# Patient Record
Sex: MALE | Race: BLACK OR AFRICAN AMERICAN | Employment: UNEMPLOYED | URBAN - METROPOLITAN AREA
[De-identification: names, ages, dates, MRNs, and addresses within clinical notes are randomized per-mention and may not be internally consistent; named-entity substitution may affect disease eponyms.]

---

## 2019-05-12 ENCOUNTER — APPOINTMENT (OUTPATIENT)
Dept: CT IMAGING | Age: 64
DRG: 045 | End: 2019-05-12
Payer: COMMERCIAL

## 2019-05-12 ENCOUNTER — APPOINTMENT (OUTPATIENT)
Dept: GENERAL RADIOLOGY | Age: 64
DRG: 045 | End: 2019-05-12
Payer: COMMERCIAL

## 2019-05-12 ENCOUNTER — HOSPITAL ENCOUNTER (INPATIENT)
Age: 64
LOS: 3 days | Discharge: NURSING, REHAB OR LONG-TERM CARE | DRG: 045 | End: 2019-05-15
Attending: EMERGENCY MEDICINE | Admitting: INTERNAL MEDICINE
Payer: COMMERCIAL

## 2019-05-12 DIAGNOSIS — M54.9 CVA TENDERNESS: Primary | ICD-10-CM

## 2019-05-12 DIAGNOSIS — E04.2 MULTIPLE THYROID NODULES: ICD-10-CM

## 2019-05-12 PROBLEM — I63.9 ACUTE CVA (CEREBROVASCULAR ACCIDENT) (HCC): Status: ACTIVE | Noted: 2019-05-12

## 2019-05-12 LAB
ANION GAP SERPL CALCULATED.3IONS-SCNC: 6 MMOL/L (ref 3–16)
BASOPHILS ABSOLUTE: 0.1 K/UL (ref 0–0.2)
BASOPHILS RELATIVE PERCENT: 1.2 %
BUN BLDV-MCNC: 14 MG/DL (ref 7–20)
CALCIUM SERPL-MCNC: 9.2 MG/DL (ref 8.3–10.6)
CHLORIDE BLD-SCNC: 106 MMOL/L (ref 99–110)
CO2: 28 MMOL/L (ref 21–32)
CREAT SERPL-MCNC: 0.7 MG/DL (ref 0.8–1.3)
EOSINOPHILS ABSOLUTE: 0.1 K/UL (ref 0–0.6)
EOSINOPHILS RELATIVE PERCENT: 2.7 %
GFR AFRICAN AMERICAN: >60
GFR NON-AFRICAN AMERICAN: >60
GLUCOSE BLD-MCNC: 113 MG/DL (ref 70–99)
GLUCOSE BLD-MCNC: 117 MG/DL (ref 70–99)
HCT VFR BLD CALC: 41.8 % (ref 40.5–52.5)
HEMOGLOBIN: 13.9 G/DL (ref 13.5–17.5)
INR BLD: 1.33 (ref 0.86–1.14)
LYMPHOCYTES ABSOLUTE: 1.5 K/UL (ref 1–5.1)
LYMPHOCYTES RELATIVE PERCENT: 30.6 %
MCH RBC QN AUTO: 28.8 PG (ref 26–34)
MCHC RBC AUTO-ENTMCNC: 33.2 G/DL (ref 31–36)
MCV RBC AUTO: 86.5 FL (ref 80–100)
MONOCYTES ABSOLUTE: 0.5 K/UL (ref 0–1.3)
MONOCYTES RELATIVE PERCENT: 9.4 %
NEUTROPHILS ABSOLUTE: 2.8 K/UL (ref 1.7–7.7)
NEUTROPHILS RELATIVE PERCENT: 56.1 %
PDW BLD-RTO: 14.2 % (ref 12.4–15.4)
PERFORMED ON: ABNORMAL
PLATELET # BLD: 241 K/UL (ref 135–450)
PMV BLD AUTO: 7.5 FL (ref 5–10.5)
POTASSIUM REFLEX MAGNESIUM: 4.2 MMOL/L (ref 3.5–5.1)
PROTHROMBIN TIME: 15.2 SEC (ref 9.8–13)
RBC # BLD: 4.83 M/UL (ref 4.2–5.9)
SODIUM BLD-SCNC: 140 MMOL/L (ref 136–145)
TROPONIN: <0.01 NG/ML
WBC # BLD: 4.9 K/UL (ref 4–11)

## 2019-05-12 PROCEDURE — 2060000000 HC ICU INTERMEDIATE R&B

## 2019-05-12 PROCEDURE — 70496 CT ANGIOGRAPHY HEAD: CPT

## 2019-05-12 PROCEDURE — 94760 N-INVAS EAR/PLS OXIMETRY 1: CPT

## 2019-05-12 PROCEDURE — 80048 BASIC METABOLIC PNL TOTAL CA: CPT

## 2019-05-12 PROCEDURE — 85025 COMPLETE CBC W/AUTO DIFF WBC: CPT

## 2019-05-12 PROCEDURE — 70450 CT HEAD/BRAIN W/O DYE: CPT

## 2019-05-12 PROCEDURE — 84484 ASSAY OF TROPONIN QUANT: CPT

## 2019-05-12 PROCEDURE — 85610 PROTHROMBIN TIME: CPT

## 2019-05-12 PROCEDURE — 70498 CT ANGIOGRAPHY NECK: CPT

## 2019-05-12 PROCEDURE — 2580000003 HC RX 258: Performed by: INTERNAL MEDICINE

## 2019-05-12 PROCEDURE — 99285 EMERGENCY DEPT VISIT HI MDM: CPT

## 2019-05-12 PROCEDURE — 36415 COLL VENOUS BLD VENIPUNCTURE: CPT

## 2019-05-12 PROCEDURE — 71045 X-RAY EXAM CHEST 1 VIEW: CPT

## 2019-05-12 PROCEDURE — 6360000004 HC RX CONTRAST MEDICATION: Performed by: EMERGENCY MEDICINE

## 2019-05-12 PROCEDURE — 6370000000 HC RX 637 (ALT 250 FOR IP): Performed by: INTERNAL MEDICINE

## 2019-05-12 RX ORDER — AMLODIPINE BESYLATE 5 MG/1
5 TABLET ORAL DAILY
Status: DISCONTINUED | OUTPATIENT
Start: 2019-05-12 | End: 2019-05-15 | Stop reason: HOSPADM

## 2019-05-12 RX ORDER — SODIUM CHLORIDE 0.9 % (FLUSH) 0.9 %
10 SYRINGE (ML) INJECTION EVERY 12 HOURS SCHEDULED
Status: DISCONTINUED | OUTPATIENT
Start: 2019-05-12 | End: 2019-05-15 | Stop reason: HOSPADM

## 2019-05-12 RX ORDER — ATORVASTATIN CALCIUM 80 MG/1
80 TABLET, FILM COATED ORAL NIGHTLY
Status: DISCONTINUED | OUTPATIENT
Start: 2019-05-12 | End: 2019-05-15 | Stop reason: HOSPADM

## 2019-05-12 RX ORDER — SODIUM CHLORIDE 0.9 % (FLUSH) 0.9 %
10 SYRINGE (ML) INJECTION PRN
Status: DISCONTINUED | OUTPATIENT
Start: 2019-05-12 | End: 2019-05-15 | Stop reason: HOSPADM

## 2019-05-12 RX ORDER — AMLODIPINE BESYLATE 5 MG/1
5 TABLET ORAL DAILY
Status: ON HOLD | COMMUNITY
End: 2019-05-15 | Stop reason: SDUPTHER

## 2019-05-12 RX ORDER — ATORVASTATIN CALCIUM 80 MG/1
80 TABLET, FILM COATED ORAL DAILY
Status: ON HOLD | COMMUNITY
End: 2019-05-31 | Stop reason: SDUPTHER

## 2019-05-12 RX ORDER — OXYBUTYNIN CHLORIDE 10 MG/1
10 TABLET, EXTENDED RELEASE ORAL DAILY
COMMUNITY

## 2019-05-12 RX ORDER — IBUPROFEN 800 MG/1
800 TABLET ORAL EVERY 6 HOURS PRN
Status: ON HOLD | COMMUNITY
End: 2019-05-15 | Stop reason: HOSPADM

## 2019-05-12 RX ORDER — ONDANSETRON 2 MG/ML
4 INJECTION INTRAMUSCULAR; INTRAVENOUS EVERY 6 HOURS PRN
Status: DISCONTINUED | OUTPATIENT
Start: 2019-05-12 | End: 2019-05-15 | Stop reason: HOSPADM

## 2019-05-12 RX ADMIN — APIXABAN 5 MG: 5 TABLET, FILM COATED ORAL at 21:22

## 2019-05-12 RX ADMIN — IOPAMIDOL 75 ML: 755 INJECTION, SOLUTION INTRAVENOUS at 13:23

## 2019-05-12 RX ADMIN — Medication 10 ML: at 22:18

## 2019-05-12 RX ADMIN — ATORVASTATIN CALCIUM 80 MG: 80 TABLET, FILM COATED ORAL at 21:22

## 2019-05-12 ASSESSMENT — PAIN DESCRIPTION - DESCRIPTORS: DESCRIPTORS: ACHING

## 2019-05-12 ASSESSMENT — ENCOUNTER SYMPTOMS
SHORTNESS OF BREATH: 0
SORE THROAT: 0
EYE REDNESS: 0
RHINORRHEA: 0
ABDOMINAL PAIN: 0

## 2019-05-12 ASSESSMENT — PAIN DESCRIPTION - PAIN TYPE: TYPE: CHRONIC PAIN

## 2019-05-12 ASSESSMENT — PAIN SCALES - GENERAL
PAINLEVEL_OUTOF10: 0
PAINLEVEL_OUTOF10: 0
PAINLEVEL_OUTOF10: 7

## 2019-05-12 ASSESSMENT — PAIN DESCRIPTION - ONSET: ONSET: ON-GOING

## 2019-05-12 ASSESSMENT — PAIN - FUNCTIONAL ASSESSMENT: PAIN_FUNCTIONAL_ASSESSMENT: PREVENTS OR INTERFERES SOME ACTIVE ACTIVITIES AND ADLS

## 2019-05-12 ASSESSMENT — PAIN DESCRIPTION - LOCATION: LOCATION: SHOULDER

## 2019-05-12 ASSESSMENT — PAIN DESCRIPTION - PROGRESSION: CLINICAL_PROGRESSION: NOT CHANGED

## 2019-05-12 ASSESSMENT — PAIN DESCRIPTION - ORIENTATION: ORIENTATION: RIGHT

## 2019-05-12 ASSESSMENT — PAIN DESCRIPTION - FREQUENCY: FREQUENCY: CONTINUOUS

## 2019-05-12 NOTE — ED NOTES
Pt incontinent of urine. Alia care performed. Clean linen and underwear applied. No skin breakdown noted to back or buttocks while turning. Warm blanket provided for comfort. VSS. Call light in reach.       Hang Marie RN  05/12/19 6452

## 2019-05-12 NOTE — ED NOTES
Report called to 5 [place]. This RN to transport pt to floor and perform bedside NIH.       James Desai RN  05/12/19 0964

## 2019-05-12 NOTE — H&P
Hospital Medicine History & Physical      PCP: No primary care provider on file. Date of Admission: 5/12/2019    Date of Service: Pt seen/examined on 5/12/2019 and Admitted to Inpatient. Chief Complaint:  Left-sided weakness and facial droop      History Of Present Illness: The patient is a 66 y.o. male with recent CVA, sickle cell trait who presents to [facility] with left sided weakness and facial droop. Pt was recently admitted from 5/7-5/9 with left face, arm, and leg weakness with dysarthria. He was given tPA and his sympotms resolved. He had a full workup including and MRI showing right parietal lobe infarct. He had a loop recorder placed by cardiology. He was discharged on Eliquis 5 mg bID and statin. As patient was leaving the hospital, he was having right arm numbness. Repeat CT Head was negative. He stated that he woke up at [address] [place] [address] and noticed a left-sided facial droop and weakness to the left upper and lower extremity. Denies dysarthria or dysphagia. States he has been compliant with his medications. Past Medical History:        Diagnosis Date    Cerebral artery occlusion with cerebral infarction [facility])        Past Surgical History:    History reviewed. No pertinent surgical history. Medications Prior to Admission:    Prior to Admission medications    Not on File       Allergies:  Patient has no known allergies. Social History:  The patient currently lives at home    TOBACCO:   reports that he has been smoking cigarettes. He has been smoking about 0.25 packs per day. He does not have any smokeless tobacco history on file. ETOH:   reports that he drinks about 3.6 oz of alcohol per week. Family History:  Reviewed in detail and negative for DM, Early CAD, Cancer, CVA. Positive as follows:    History reviewed.  No pertinent family superiorly to the level of the   head of the right caudate nucleus. Finding is suspicious representing subtle   area of acute/recent ischemic change. Follow-up MRI examination with   diffusion imaging would be helpful for more complete evaluation. Critical results were called by Dr. Claudia Chavze. Henry Agarwal MD to [facility] on 5/12/2019 at 13:43. CTA HEAD W CONTRAST   Preliminary Result   No high-grade stenosis or focal occlusion involving the intracranial or   cervical vasculature. No evidence of acute dissection. No evidence of   aneurysm. Small amount of frothy secretions within the trachea. Possibility of small   volume aspiration is not excluded. No parenchymal abnormality within the   visualized portion of the lungs. Correlation with a dedicated chest   radiograph is recommended. Ectatic ascending thoracic aorta. No evidence of acute dissection within   visualized thoracic aorta. CTA NECK W CONTRAST   Preliminary Result   No high-grade stenosis or focal occlusion involving the intracranial or   cervical vasculature. No evidence of acute dissection. No evidence of   aneurysm. Small amount of frothy secretions within the trachea. Possibility of small   volume aspiration is not excluded. No parenchymal abnormality within the   visualized portion of the lungs. Correlation with a dedicated chest   radiograph is recommended. Ectatic ascending thoracic aorta. No evidence of acute dissection within   visualized thoracic aorta. MRI brain without contrast    (Results Pending)         CBC   Recent Labs     05/12/19  1334   WBC 4.9   HGB 13.9   HCT 41.8         RENAL  Recent Labs     05/12/19  1334      K 4.2      CO2 28   BUN 14   CREATININE 0.7*     LFT'S  No results for input(s): AST, ALT, ALB, BILIDIR, BILITOT, ALKPHOS in the last 72 hours.   COAG  Recent Labs     05/12/19  1334   INR 1.33*     CARDIAC ENZYMES  Recent Labs

## 2019-05-13 ENCOUNTER — APPOINTMENT (OUTPATIENT)
Dept: MRI IMAGING | Age: 64
DRG: 045 | End: 2019-05-13
Payer: COMMERCIAL

## 2019-05-13 LAB
CHOLESTEROL, TOTAL: 153 MG/DL (ref 0–199)
HDLC SERPL-MCNC: 41 MG/DL (ref 40–60)
LDL CHOLESTEROL CALCULATED: 99 MG/DL
TRIGL SERPL-MCNC: 66 MG/DL (ref 0–150)
VLDLC SERPL CALC-MCNC: 13 MG/DL

## 2019-05-13 PROCEDURE — 97530 THERAPEUTIC ACTIVITIES: CPT

## 2019-05-13 PROCEDURE — 36415 COLL VENOUS BLD VENIPUNCTURE: CPT

## 2019-05-13 PROCEDURE — 70551 MRI BRAIN STEM W/O DYE: CPT

## 2019-05-13 PROCEDURE — 80061 LIPID PANEL: CPT

## 2019-05-13 PROCEDURE — 97530 THERAPEUTIC ACTIVITIES: CPT | Performed by: PHYSICAL THERAPIST

## 2019-05-13 PROCEDURE — 6370000000 HC RX 637 (ALT 250 FOR IP): Performed by: INTERNAL MEDICINE

## 2019-05-13 PROCEDURE — 97163 PT EVAL HIGH COMPLEX 45 MIN: CPT | Performed by: PHYSICAL THERAPIST

## 2019-05-13 PROCEDURE — 97116 GAIT TRAINING THERAPY: CPT | Performed by: PHYSICAL THERAPIST

## 2019-05-13 PROCEDURE — 2580000003 HC RX 258: Performed by: INTERNAL MEDICINE

## 2019-05-13 PROCEDURE — 92610 EVALUATE SWALLOWING FUNCTION: CPT

## 2019-05-13 PROCEDURE — 2060000000 HC ICU INTERMEDIATE R&B

## 2019-05-13 PROCEDURE — 97167 OT EVAL HIGH COMPLEX 60 MIN: CPT

## 2019-05-13 PROCEDURE — 92523 SPEECH SOUND LANG COMPREHEN: CPT

## 2019-05-13 PROCEDURE — 94760 N-INVAS EAR/PLS OXIMETRY 1: CPT

## 2019-05-13 RX ORDER — ACETAMINOPHEN 325 MG/1
650 TABLET ORAL EVERY 4 HOURS PRN
Status: DISCONTINUED | OUTPATIENT
Start: 2019-05-13 | End: 2019-05-15 | Stop reason: HOSPADM

## 2019-05-13 RX ADMIN — ACETAMINOPHEN 650 MG: 325 TABLET ORAL at 09:23

## 2019-05-13 RX ADMIN — AMLODIPINE BESYLATE 5 MG: 5 TABLET ORAL at 09:23

## 2019-05-13 RX ADMIN — APIXABAN 5 MG: 5 TABLET, FILM COATED ORAL at 09:23

## 2019-05-13 RX ADMIN — ATORVASTATIN CALCIUM 80 MG: 80 TABLET, FILM COATED ORAL at 21:33

## 2019-05-13 RX ADMIN — Medication 10 ML: at 22:42

## 2019-05-13 RX ADMIN — APIXABAN 5 MG: 5 TABLET, FILM COATED ORAL at 21:33

## 2019-05-13 RX ADMIN — Medication 10 ML: at 09:25

## 2019-05-13 ASSESSMENT — PAIN DESCRIPTION - DESCRIPTORS
DESCRIPTORS: ACHING
DESCRIPTORS: ACHING

## 2019-05-13 ASSESSMENT — PAIN DESCRIPTION - PAIN TYPE
TYPE: CHRONIC PAIN
TYPE: CHRONIC PAIN

## 2019-05-13 ASSESSMENT — PAIN SCALES - GENERAL
PAINLEVEL_OUTOF10: 6
PAINLEVEL_OUTOF10: 0

## 2019-05-13 ASSESSMENT — PAIN - FUNCTIONAL ASSESSMENT
PAIN_FUNCTIONAL_ASSESSMENT: PREVENTS OR INTERFERES SOME ACTIVE ACTIVITIES AND ADLS
PAIN_FUNCTIONAL_ASSESSMENT: PREVENTS OR INTERFERES SOME ACTIVE ACTIVITIES AND ADLS

## 2019-05-13 ASSESSMENT — PAIN DESCRIPTION - FREQUENCY
FREQUENCY: CONTINUOUS
FREQUENCY: CONTINUOUS

## 2019-05-13 ASSESSMENT — PAIN DESCRIPTION - PROGRESSION
CLINICAL_PROGRESSION: NOT CHANGED
CLINICAL_PROGRESSION: NOT CHANGED

## 2019-05-13 ASSESSMENT — PAIN DESCRIPTION - LOCATION
LOCATION: SHOULDER
LOCATION: SHOULDER

## 2019-05-13 ASSESSMENT — PAIN DESCRIPTION - ORIENTATION
ORIENTATION: RIGHT
ORIENTATION: RIGHT

## 2019-05-13 ASSESSMENT — PAIN DESCRIPTION - ONSET
ONSET: ON-GOING
ONSET: ON-GOING

## 2019-05-13 NOTE — CONSULTS
No thyroid issues. Hematologic- No bleeding difficulty. No fatigue  Neurologic- + weakness. No Headache. Exam:  Blood pressure (!) 151/97, pulse 64, temperature 99.1 °F (37.3 °C), temperature source Oral, resp. rate 16, height 6' (1.829 m), weight 158 lb 15.2 oz (72.1 kg), SpO2 97 %. Constitutional    Vital signs: BP, HR, and RR reviewed   General alert, no distress, well-nourished  Eyes: unable to visualize the fundi  Cardiovascular: pulses symmetric in all 4 extremities. No peripheral edema. Psychiatric: cooperative with examination, no psychotic behavior noted. Neurologic  Mental status:   orientation to person, place, time, situation. General fund of knowledge grossly intact   Memory grossly intact   Attention intact as able to attend well to the exam     Language fairly fluent, a bit of word finding at times. Comprehension intact; follows simple commands  Cranial nerves:   CN2: visual fields full w/o extinction on confrontational testing  CN 3,4,6: extraocular muscles intact. Pupils are equal, round, reactive bilaterally. CN5: left facial numbness. CN7: left facial weakness with mild dysarthria. CN8: hearing grossly intact  CN9: palate elevated symmetrically  CN11: trap full strength on shoulder shrug  CN12: tongue protrudes to the left. Strength: left sided weakness w/ drift. Good strength in his RUE/RLE. Deep tendon reflexes: normal in all 4 extremities  Sensory: altered sensation to his LUE/LLE. Cerebellar/coordination: finger nose finger normal without ataxia in his RUE. Tone: normal in all 4 extremities  Gait: deferred at this time given weakness.       Labs  Glucose 117  Na 140  K 4.2  BUN 14  Creatinine 0.7    WBC 4.9K  Hg 13.9  Platelets 493  INR 8.93    LDL 99  HgA1c 5.9    Studies  CT head 5/12/19, independently reviewed  Very subtle area of asymmetric low attenuation in the right basal ganglia region extending superiorly to the level of the head of the right

## 2019-05-13 NOTE — FLOWSHEET NOTE
05/13/19 1313   Encounter Summary   Services provided to: Patient   Referral/Consult From: Nurse   Support System Family members   Continue Visiting   (visit, prayer, katiuska, AD doc discussion 5/13 CL)   Complexity of Encounter High   Length of Encounter 15 minutes   Spiritual/Yazdanism   Type Spiritual support   Assessment Calm; Approachable;Coping   Intervention Active listening;Explored feelings, thoughts, concerns;Prayer;Discussed belief system/Christian practices/eloy;Discussed illness/injury and it's impact   Outcome Engaged in conversation;Coping   Advance Directives (For Healthcare)   Healthcare Directive No, patient does not have an advance directive for healthcare treatment   Advance Directives Documents given;Documents explained  (AD protocol complete)   Electronically signed by Hilton Client on 5/13/2019 at 1:16 PM

## 2019-05-13 NOTE — PROGRESS NOTES
puree/pudding  Strategies: 90 degree positioning with all p.o. intake; small bites/sips; reduce rate of intake    Plan:      D/C Recommendations:   Skilled ST recommended upon discharge from acute care    Therapy  Requires SLP Intervention: Yes  Therapeutic Interventions: Bolus control exercises, Laryngeal exercises, Patient/Family education, Diet tolerance monitoring, Pharyngeal exercises, Therapeutic PO trials with SLP, Oral motor exercises  Duration/Frequency of Treatment: ST to tx 3-5 times per week for congitve-language, dusarthria, and dyspahgia. Dysphagia Goals  The patient will tolerate recommended diet without observed clinical signs of aspiration,   The patient will improve oral motor function via therapeutic oral motor exercises to 5/5 each trial.,   The patient will improve oral preparation phase via bolus control/manipulation exercises to 5/5 each trial.,   The patient/caregiver will demonstrate understanding of compensatory strategies for improved swallowing safety. ,   The patient will tolerate thin liquids without signs and symptoms of aspiration 10/10 via straw.,   The patient will tolerate regular consistency solids 10/10. The patient will improve pharyngea phase via pharyngolaryngeal exercises 5/5 each for increased swallow safety with PO diet. Speech and Language Goals  Goal 1: The patient will improve speech intelligibility to >90% in conversation via compensatory strategy use and oral motor/vocal strengthening. Goal 2: The patient will improve recall for past and future events to min cues  Goal 3: The patient will improve problem solving/reasoning for functional activities to min cues.     Prognosis  Prognosis for safe diet advancement: good    Education  Consulted and agree with results and recommendations: Patient, RN, Family member  Patient Education: Completed on recommendations/plan  Patient Education Response: Verbalizes understanding      ASSESSMENT:   Included Clinical Evaluation of

## 2019-05-14 PROCEDURE — 93005 ELECTROCARDIOGRAM TRACING: CPT | Performed by: INTERNAL MEDICINE

## 2019-05-14 PROCEDURE — 97110 THERAPEUTIC EXERCISES: CPT | Performed by: PHYSICAL THERAPIST

## 2019-05-14 PROCEDURE — 86147 CARDIOLIPIN ANTIBODY EA IG: CPT

## 2019-05-14 PROCEDURE — 93312 ECHO TRANSESOPHAGEAL: CPT

## 2019-05-14 PROCEDURE — 97530 THERAPEUTIC ACTIVITIES: CPT

## 2019-05-14 PROCEDURE — 94760 N-INVAS EAR/PLS OXIMETRY 1: CPT

## 2019-05-14 PROCEDURE — 2580000003 HC RX 258: Performed by: INTERNAL MEDICINE

## 2019-05-14 PROCEDURE — 93010 ELECTROCARDIOGRAM REPORT: CPT | Performed by: INTERNAL MEDICINE

## 2019-05-14 PROCEDURE — 81240 F2 GENE: CPT

## 2019-05-14 PROCEDURE — 92526 ORAL FUNCTION THERAPY: CPT

## 2019-05-14 PROCEDURE — 99152 MOD SED SAME PHYS/QHP 5/>YRS: CPT

## 2019-05-14 PROCEDURE — 97535 SELF CARE MNGMENT TRAINING: CPT

## 2019-05-14 PROCEDURE — 6370000000 HC RX 637 (ALT 250 FOR IP)

## 2019-05-14 PROCEDURE — 97110 THERAPEUTIC EXERCISES: CPT

## 2019-05-14 PROCEDURE — 6360000002 HC RX W HCPCS

## 2019-05-14 PROCEDURE — 2060000000 HC ICU INTERMEDIATE R&B

## 2019-05-14 PROCEDURE — 92507 TX SP LANG VOICE COMM INDIV: CPT

## 2019-05-14 PROCEDURE — 81241 F5 GENE: CPT

## 2019-05-14 PROCEDURE — 2580000003 HC RX 258

## 2019-05-14 PROCEDURE — 6370000000 HC RX 637 (ALT 250 FOR IP): Performed by: INTERNAL MEDICINE

## 2019-05-14 PROCEDURE — 93325 DOPPLER ECHO COLOR FLOW MAPG: CPT

## 2019-05-14 PROCEDURE — 97530 THERAPEUTIC ACTIVITIES: CPT | Performed by: PHYSICAL THERAPIST

## 2019-05-14 PROCEDURE — 99222 1ST HOSP IP/OBS MODERATE 55: CPT | Performed by: INTERNAL MEDICINE

## 2019-05-14 PROCEDURE — B24BZZ4 ULTRASONOGRAPHY OF HEART WITH AORTA, TRANSESOPHAGEAL: ICD-10-PCS | Performed by: INTERNAL MEDICINE

## 2019-05-14 PROCEDURE — 93320 DOPPLER ECHO COMPLETE: CPT

## 2019-05-14 PROCEDURE — 97116 GAIT TRAINING THERAPY: CPT | Performed by: PHYSICAL THERAPIST

## 2019-05-14 RX ORDER — SODIUM CHLORIDE 9 MG/ML
INJECTION, SOLUTION INTRAVENOUS CONTINUOUS
Status: DISCONTINUED | OUTPATIENT
Start: 2019-05-14 | End: 2019-05-14

## 2019-05-14 RX ORDER — CLOPIDOGREL BISULFATE 75 MG/1
75 TABLET ORAL DAILY
Status: DISCONTINUED | OUTPATIENT
Start: 2019-05-15 | End: 2019-05-15 | Stop reason: HOSPADM

## 2019-05-14 RX ORDER — SODIUM CHLORIDE 0.9 % (FLUSH) 0.9 %
10 SYRINGE (ML) INJECTION PRN
Status: DISCONTINUED | OUTPATIENT
Start: 2019-05-14 | End: 2019-05-14 | Stop reason: ALTCHOICE

## 2019-05-14 RX ORDER — ASPIRIN 81 MG/1
81 TABLET, CHEWABLE ORAL DAILY
Status: DISCONTINUED | OUTPATIENT
Start: 2019-05-15 | End: 2019-05-15 | Stop reason: HOSPADM

## 2019-05-14 RX ORDER — SODIUM CHLORIDE 0.9 % (FLUSH) 0.9 %
10 SYRINGE (ML) INJECTION EVERY 12 HOURS SCHEDULED
Status: DISCONTINUED | OUTPATIENT
Start: 2019-05-14 | End: 2019-05-14 | Stop reason: ALTCHOICE

## 2019-05-14 RX ADMIN — Medication 10 ML: at 22:01

## 2019-05-14 RX ADMIN — AMLODIPINE BESYLATE 5 MG: 5 TABLET ORAL at 13:26

## 2019-05-14 RX ADMIN — ACETAMINOPHEN 650 MG: 325 TABLET ORAL at 13:26

## 2019-05-14 RX ADMIN — ATORVASTATIN CALCIUM 80 MG: 80 TABLET, FILM COATED ORAL at 20:21

## 2019-05-14 RX ADMIN — ACETAMINOPHEN 650 MG: 325 TABLET ORAL at 20:21

## 2019-05-14 RX ADMIN — APIXABAN 5 MG: 5 TABLET, FILM COATED ORAL at 13:26

## 2019-05-14 RX ADMIN — Medication 10 ML: at 13:26

## 2019-05-14 ASSESSMENT — PAIN - FUNCTIONAL ASSESSMENT
PAIN_FUNCTIONAL_ASSESSMENT: ACTIVITIES ARE NOT PREVENTED
PAIN_FUNCTIONAL_ASSESSMENT: PREVENTS OR INTERFERES SOME ACTIVE ACTIVITIES AND ADLS
PAIN_FUNCTIONAL_ASSESSMENT: ACTIVITIES ARE NOT PREVENTED
PAIN_FUNCTIONAL_ASSESSMENT: PREVENTS OR INTERFERES SOME ACTIVE ACTIVITIES AND ADLS

## 2019-05-14 ASSESSMENT — PAIN DESCRIPTION - ORIENTATION
ORIENTATION: RIGHT
ORIENTATION: RIGHT

## 2019-05-14 ASSESSMENT — PAIN DESCRIPTION - LOCATION
LOCATION: BACK
LOCATION: ABDOMEN
LOCATION: ABDOMEN
LOCATION: BACK

## 2019-05-14 ASSESSMENT — PAIN DESCRIPTION - PROGRESSION
CLINICAL_PROGRESSION: NOT CHANGED
CLINICAL_PROGRESSION: GRADUALLY WORSENING

## 2019-05-14 ASSESSMENT — PAIN DESCRIPTION - DESCRIPTORS
DESCRIPTORS: ACHING

## 2019-05-14 ASSESSMENT — PAIN DESCRIPTION - ONSET
ONSET: ON-GOING

## 2019-05-14 ASSESSMENT — PAIN DESCRIPTION - FREQUENCY
FREQUENCY: INTERMITTENT
FREQUENCY: CONTINUOUS

## 2019-05-14 ASSESSMENT — PAIN DESCRIPTION - PAIN TYPE
TYPE: CHRONIC PAIN

## 2019-05-14 ASSESSMENT — PAIN SCALES - GENERAL
PAINLEVEL_OUTOF10: 0
PAINLEVEL_OUTOF10: 3
PAINLEVEL_OUTOF10: 0
PAINLEVEL_OUTOF10: 8
PAINLEVEL_OUTOF10: 3
PAINLEVEL_OUTOF10: 3

## 2019-05-14 NOTE — CONSULTS
Oncology Hematology Care    Consult Note        HISTORY OF PRESENT ILLNESS:      The patient is a 51-year-old male with history of sickle cell trait who is admitted to [facility] with recurrent right-sided stroke. The patient had recently been discharged from [address] [place] [address] where he had been admitted from 5/7/2019 to 5/10/2019 following a cerebral vascular accident. An MRI of the head on 5 7/9/2018 showed a tiny area of diffusion restriction involving the right parietal lobe suggesting an acute/subacute ischemic event. CT angiograph's of the head showed no large vessel occlusive changes. Cardiology saw the patient for a loop recorder placement. An echo cardiogram and CARLO showed abnormalities of the heart including a left ventricle that was mildly dilated with hypokinesis of the basal inferolateral, basal mid inferior and basal mid inferolateral myocardium as well as bilateral dilated atriums. Apparently the patient had been on aspirin which was discontinued and he was placed on Eliquis during that hospitalization. His presentation included a left facial droop, difficulty speaking, numbness and weakness in his left upper extremity and lower extremity. He did receive TPA within 1 hour of onset of his symptoms. His symptoms resolved within 3 hours and he went back to normal again. There was some discussion as to whether or not to add aspirin to the Eliquis based on cardiac evaluation going forward. Discharge summary relative to this hospitalization may be found in care everywhere. On 5/12/2013 the patient presented to [facility] [place] [facility] [place] again with left facial droop and left upper extremity weakness greater than left lower extremity weakness. He reported that he had taken 2 doses of his Eliquis at home prior to the onset of these events which were again reminiscent of his prior hospitalization. He states that he is now slightly improved. His Eliquis was continued.   Cardiology is seeing the BACTERIA, CLARITYU, SPECGRAV, LEUKOCYTESUR, UROBILINOGEN, BILIRUBINUR, BLOODU, GLUCOSEU, AMORPHOUS    IMPRESSION/PLAN:    Today I met with the patient and discussed his clinical situation. It would appear as though the patient has had further CNS progression while taking Eliquis. The patient states that he took 2 doses at home before the event that brought him to the hospital with left-sided weakness that occurred. The Eliquis is been continued. Whether or not the patient has a hypercoagulable condition remains to be determined. The nuances of testing well the setting of an acute cerebrovascular event and while on anticoagulation was discussed. Some of the testing we can accomplish such as a factor V Leiden assay, prothrombin gene mutation, and anticardiolipin antibodies. Protein based testing such as protein C, protein S, Antithrombin III cannot be accomplished. A cardiac evaluation is also anticipated. I do not think that the patient's sickle cell trait necessarily caused his cerebrovascular events though we do know that patients with sickle cell disease are more prone. Patients with trait are less likely so. And so far as the patient progressed while on oral Eliquis I would be inclined to add the aspirin as had been previously suggested at [facility]. Whether or not to change to low molecular weight heparin is a more difficult decision. The Eliquis would have covered most hypercoagulable states although may be less effective in those patients who have anticardiolipin antibodies. I have discussed this case with my colleagues and you could switch the patient to a low molecular heparin while we wait for this testing. Certainly would be difficult for the patient to continue as an outpatient on that regimen. Alternatively, it would not be unusual to simply place the patient on Plavix and aspirin. Management of cerebrovascular accidents really are within the realm of neurology and sometimes cardiology.   Unless a hypercoagulable condition is actually identified, the final decisions on anticoagulation/antiplatelet agents should fall to the neurologist.    Thank you very much for allowing me to participate in the care of this patient. I will plan to keep you fully appraised of your patient's progress. Karma Solorio. Dinah Pandey M.D., MPH  Chair, Department of  Clinical [address]  Office [phone]  Cell [phone]  [email or web address]       \"Participating in a clinical trial is the first step to fighting cancer; not the last.\"    5/14/2019 8:33 AM

## 2019-05-14 NOTE — CONSULTS
person, place, and time. He appears well-developed and well-nourished. In no acute distress. Head: Normocephalic and atraumatic. Left facial droop  Neck: Neck supple. No JVD present. Carotid bruit is not present. No mass and no thyromegaly present. No lymphadenopathy present. Cardiovascular: Normal rate, regular rhythm, normal heart sounds and intact distal pulses. Exam reveals no gallop and no friction rub. No murmur heard. Pulmonary/Chest: Effort normal and breath sounds normal. No respiratory distress. He has no wheezes, rhonchi or rales. Abdominal: Soft, non-tender. Bowel sounds and aorta are normal. He exhibits no organomegaly, mass or bruit. Extremities: No edema, cyanosis, or clubbing. Pulses are 2+ radial/carotid/dorsalis pedis and posterior tibial bilaterally. Neurological: He is alert and oriented to person, place, and time. Left facial droop. Skin: Skin is warm and dry. There is no rash or diaphoresis. Psychiatric: He has a normal mood and affect.  His speech is normal and behavior is normal.     EKG Interpretation: Sinus rhythm    Lab Review:   Lab Results   Component Value Date    TRIG 66 05/13/2019    HDL 41 05/13/2019    LDLCALC 99 05/13/2019    LABVLDL 13 05/13/2019     Lab Results   Component Value Date     05/12/2019    K 4.2 05/12/2019    BUN 14 05/12/2019    CREATININE 0.7 05/12/2019     Recent Labs     05/12/19  1334   WBC 4.9   HGB 13.9   HCT 41.8        Head and Neck CTA 5/12/19:  No high-grade stenosis or focal occlusion involving the intracranial or   cervical vasculature.  No evidence of acute dissection.  No evidence of   aneurysm.       Small amount of frothy secretions within the trachea.  Possibility of small   volume aspiration is not excluded.  No parenchymal abnormality within the   visualized portion of the lungs.  Correlation with a dedicated chest   radiograph is recommended.       Ectatic ascending thoracic aorta.  No evidence of acute dissection

## 2019-05-14 NOTE — PROGRESS NOTES
ganglia. Response To Previous Treatment: Patient with no complaints from previous session. Family / Caregiver Present: No  Subjective  Subjective: pt is agreeable to therapy; pt reports some R sided flank discomfort, nursing notified; warmed blanket positioned over sore area in sitting to determine if heat decreases discomfort          Orientation  Orientation  Overall Orientation Status: Within Functional Limits  Cognition   Cognition  Overall Cognitive Status: [facility]  Cognition Comment: some expressive aphasia and delayed processing. Slightly impulsive. Objective   Bed mobility  Supine to Sit: Minimal assistance(with HOB elevated)  Transfers  Sit to Stand: Contact guard assistance;Minimal Assistance;2 Person Assistance  Stand to sit: Contact guard assistance;Minimal Assistance;2 Person Assistance  Comment: with verbal cues for hand placement  Ambulation  Ambulation?: Yes  Ambulation 1  Surface: level tile  Device: Rolling Walker  Assistance: Minimal assistance; Moderate assistance;2 Person assistance  Quality of Gait: pt slightly unsteady and needs cues to advance LLE and straighten knee prior to WB on L as otherwise knee bhanu; PT assisted to ensure that pt was not overly hyperextending his L knee as he uses his hip ext to straighten knee with planted foot  Distance: 22' x2 with standing rest break between     Balance  Comments: CGA/min A for dynamic sitting balance and static standing with walker; pt tends to lean R in sitting  Exercises  Comments: performed B LE ex sitting EOB, needs verbal cues for L due to pt not always completing task (ie: with straighten knee but not fully unless verbal cues given to focus on task)         Comment: assisted with changing adult brief and positioning in chair for comfort and pressure relief              Assessment   Body structures, Functions, Activity limitations: Decreased functional mobility   Assessment: pt is a 65 yo male who was adm to hosp with L sided facial droop and UE/LE weakness; pt currently is well below his baseline of being Ind without an AD. He is making progress however continues to need assist of 1-2 for bed mob, transfers and gait. Feel pt will benefit from continued therapy at discharge to address deficits to allow pt to return home  Prognosis: Good  Patient Education: reviewed call light and not getting up without assist  REQUIRES PT FOLLOW UP: Yes  Activity Tolerance  Activity Tolerance: Patient Tolerated treatment well  Activity Tolerance: easily distracted       AM-PAC Score  AM-PAC Inpatient Mobility Raw Score : 13  AM-PAC Inpatient T-Scale Score : 36.74  Mobility Inpatient CMS 0-100% Score: 64.91  Mobility Inpatient CMS G-Code Modifier : CL          Goals  Short term goals  Time Frame for Short term goals: by discharge -all goals ongoing 5-14  Short term goal 1: bed mob MI  Short term goal 2: transfers SBA  Short term goal 3: amb 48' with LRAD SBA  Short term goal 4: negotiate stairs when able  Patient Goals   Patient goals : to get back to his home and finish remodeling     Plan    Plan  Times per week: 3-5  Current Treatment Recommendations: Functional Mobility Training  Safety Devices  Type of devices: Call light within reach, Chair alarm in place, Left in chair, Nurse notified     Therapy Time   Individual Concurrent Group Co-treatment   Time In 0845         Time Out [id number]         Minutes 52              If patient is discharged prior to the next physical therapy visit;  Please see last written PT note for discharge status.    Katelynn Negro, PT, [id number]   KATELYNN NEGRO, PT

## 2019-05-14 NOTE — PROGRESS NOTES
exercises, Patient/Family education, Diet tolerance monitoring, Pharyngeal exercises, Therapeutic PO trials with SLP, Oral motor exercises  Duration/Frequency of Treatment: ST to tx 3-5 times per week for congitve-language, dusarthria, and dyspahgia. Prognosis  Prognosis for safe diet advancement: good    Education  Consulted and agree with results and recommendations: Patient, RN, Family member  Patient Education: Completed on recommendations/plan  Patient Education Response: Verbalizes understanding    Duration/Frequency of therapy while on unit: Duration/Frequency of Treatment  Duration/Frequency of Treatment: ST to tx 3-5 times per week for congitve-language, dusarthria, and dyspahgia. Discharge Instructions:   Anticipate Yes__x__No__ for further skilled Speech Therapy for Dysphagia at discharge    This note serves as a D/C Summary in the event that this patient is discharged prior to the next therapy session.       Timed Code Treatment:  Total Treatment Time:25     Nicko Osei MA CCC-SLP #[id number]  Speech Language Pathologist

## 2019-05-14 NOTE — PLAN OF CARE
Problem: Falls - Risk of:  Goal: Will remain free from falls  Description  Will remain free from falls  5/14/2019 1112 by Haley Burnham RN  Outcome: Ongoing  5/14/2019 0326 by Paola Powell RN  Outcome: Ongoing  Goal: Absence of physical injury  Description  Absence of physical injury  Outcome: Ongoing     Problem: HEMODYNAMIC STATUS  Goal: Patient has stable vital signs and fluid balance  5/14/2019 1112 by Haley Burnham RN  Outcome: Ongoing  5/14/2019 0326 by Paola Powell RN  Outcome: Ongoing     Problem: ACTIVITY INTOLERANCE/IMPAIRED MOBILITY  Goal: Mobility/activity is maintained at optimum level for patient  5/14/2019 1112 by Haley Burnham RN  Outcome: Ongoing  5/14/2019 0326 by Paola Powell RN  Outcome: Ongoing     Problem: COMMUNICATION IMPAIRMENT  Goal: Ability to express needs and understand communication  5/14/2019 1112 by Haley Burnham RN  Outcome: Ongoing  5/14/2019 0326 by Paola Powell RN  Outcome: Ongoing     Problem: Risk for Impaired Skin Integrity  Goal: Tissue integrity - skin and mucous membranes  Description  Structural intactness and normal physiological function of skin and  mucous membranes.   Outcome: Ongoing

## 2019-05-14 NOTE — CARE COORDINATION
Noted per MRI positive for Rt. CVA. Will follow and plan admit once work up complete. Please advise. He will require a pre-cert from his insurance.

## 2019-05-14 NOTE — PROGRESS NOTES
Neurology Progress Note    Updates  No significant events to note. Patient feel OK, may be a bit better from yesterday. Past Medical History:   Diagnosis Date    Cerebral artery occlusion with cerebral infarction ([facility] [address]      Current Facility-Administered Medications:     0.9 % sodium chloride infusion, , Intravenous, Continuous, Sandy Sandoval MD    sodium chloride flush 0.9 % injection 10 mL, 10 mL, Intravenous, 2 times per day, Sandy Sandoval MD    sodium chloride flush 0.9 % injection 10 mL, 10 mL, Intravenous, PRN, Sandy Sandoval MD    acetaminophen (TYLENOL) tablet 650 mg, 650 mg, Oral, Q4H PRN, Pili Garcia MD, 650 mg at 05/13/19 7130    sodium chloride flush 0.9 % injection 10 mL, 10 mL, Intravenous, 2 times per day, Pili Garcia MD, 10 mL at 05/13/19 2242    sodium chloride flush 0.9 % injection 10 mL, 10 mL, Intravenous, PRN, Pili Garcia MD    magnesium hydroxide (MILK OF MAGNESIA) 400 MG/5ML suspension 30 mL, 30 mL, Oral, Daily PRN, Pili Garcia MD    ondansetron [facility]) injection 4 mg, 4 mg, Intravenous, Q6H PRN, Pili Garcia MD    atorvastatin (LIPITOR) tablet 80 mg, 80 mg, Oral, Nightly, Pili Garcia MD, 80 mg at 05/13/19 2133    apixaban (ELIQUIS) tablet 5 mg, 5 mg, Oral, BID, Pili Garcia MD, 5 mg at 05/13/19 2133    amLODIPine (NORVASC) tablet 5 mg, 5 mg, Oral, Daily, Pili Garcia MD, 5 mg at 05/13/19 2830    Exam  Blood pressure (!) 157/104, pulse 67, temperature 97.8 °F (36.6 °C), temperature source Oral, resp. rate 18, height 6' (1.829 m), weight 156 lb 8.4 oz (71 kg), SpO2 99 %. Constitutional                          Vital signs: BP, HR, and RR reviewed            General alert, no distress, well-nourished  Eyes: unable to visualize the fundi  Cardiovascular: pulses symmetric in all 4 extremities. Psychiatric: cooperative with examination, no psychotic behavior noted.   Neurologic  Mental status:   orientation to person, place              Attention intact as able to attend well to the exam                Language no aphasia. Comprehension intact; follows simple commands  Cranial nerves:   CN2: visual fields full w/o extinction on confrontational testing  CN 3,4,6: extraocular muscles intact. Pupils are equal, round, reactive bilaterally. CN5: left facial numbness. CN7: left facial weakness with mild dysarthria. CN8: hearing grossly intact  CN11: trap full strength on shoulder shrug  CN12: tongue protrudes to the left. Strength: left sided weakness w/ drift. Good strength in his RUE/RLE. Sensory: altered sensation to his LUE/LLE. Cerebellar/coordination: finger nose finger normal without ataxia in his RUE. Tone: normal in all 4 extremities  Gait: deferred at this time given weakness. ROS  Constitutional- No weight loss or fevers  Eyes- No diplopia. No photophobia. Ears/nose/throat- No dysphagia. + Dysarthria  Cardiovascular- No palpitations. No chest pain  Respiratory- No dyspnea. No Cough  Gastrointestinal- No Abdominal pain. No Vomiting. Genitourinary- No incontinence. No urinary retention  Musculoskeletal- No myalgia. No arthralgia  Skin- No rash. No easy bruising. Psychiatric- No depression. No anxiety  Endocrine- No diabetes. No thyroid issues. Hematologic- No bleeding difficulty. No fatigue  Neurologic- + weakness. No Headache. Labs  LDL 99  HgA1c 5.9    Studies  MRI brain w/o contrast 5/13/19, independently reviewed  Acute right basal ganglia infarct. No hemorrhage. CTA head/neck 5/12/19  No high grade stenosis or focal occlusion involving the intracranial or cervical vasculature. No dissection. No aneurysm. Impression  1. Acute right basal ganglia brain infarct, which occurred despite having been started on anticoagulation (Eliquis) for an abnormal TTE (hypokinesis, atrial dilation) and recurrent TIAs.   He also had recent stroke in the

## 2019-05-14 NOTE — PROGRESS NOTES
Occupational Therapy  Facility/Department: [address] PROGRESSIVE CARE  Daily Treatment Note  NAME: Regina Lester  : 1955  MRN: [id number]    Date of Service: 2019    Discharge Recommendations:Nick Westbrook scored a 15/24 on the AM-PAC ADL Inpatient form. Current research shows that an AM-PAC score of 17 or less is typically not associated with a discharge to the patient's home setting. Based on the patients AM-PAC score and their current ADL deficits, it is recommended that the patient have 5-7 sessions per week of Occupational Therapy at d/c to increase the patients independence. 5-7 sessions per week  OT Equipment Recommendations  Other: TBD     Assessment   Performance deficits / Impairments: Decreased functional mobility ; Decreased ADL status; Decreased strength;Decreased endurance;Decreased balance;Decreased sensation;Decreased safe awareness;Decreased ROM; Decreased fine motor control;Decreased coordination  Assessment: Patient tolerated therapy session well this date however con't to be functioning below baseline secondary to decreased balance, LUE/LLE weakness, and safety awareness. Pt required up to mod A x 2 with RW for fxl mobility, min A x 2 for fxl transfers, and up to max A for LE dressing. Pt would benefit and tolerate a more intense therapy session to address pt goals and deficits to assist pt back to baseline as he was fully independent PTA and working in the community. Con't OT poc. Prognosis: Good  Patient Education: safety, walker management, sitting balance, therex, safe transfers, d/c planning   REQUIRES OT FOLLOW UP: Yes  Activity Tolerance  Activity Tolerance: Patient Tolerated treatment well  Safety Devices  Safety Devices in place: Yes  Type of devices: Left in chair;Nurse notified;Gait belt;Call light within reach; Chair alarm in place         Patient Diagnosis(es): The encounter diagnosis was CVA tenderness.       has a past medical history of Cerebral artery recliner with mod vc's for safe hand placement and slightly impulsive during transfers. Cognition  Overall Cognitive Status: WFL  Cognition Comment: some expressive aphasia and delayed processing. Slightly impulsive. Type of ROM/Therapeutic Exercise  Type of ROM/Therapeutic Exercise: AROM  Comment: seated EOB ; LUE to increase mobility and coordination. Tactile cues provided. Exercises  Scapular Protraction: x10 reps   Scapular Retraction: x10 reps   Elbow Flexion: x10 reps   Elbow Extension: x10 reps   Finger Flexion: x10 reps   Finger Extension: x10 reps          Plan   Plan  Times per week: 3-5  Times per day: Daily  Current Treatment Recommendations: Strengthening, ROM, Balance Training, Functional Mobility Training, Endurance Training, Safety Education & Training, Patient/Caregiver Education & Training, Equipment Evaluation, Education, & procurement, Self-Care / ADL      AM-PAC Score        AM-PAC Inpatient Daily Activity Raw Score: 15  AM-PAC Inpatient ADL T-Scale Score : 34.69  ADL Inpatient CMS 0-100% Score: 56.46  ADL Inpatient CMS G-Code Modifier : CK    Goals  Short term goals  Time Frame for Short term goals: until d/c:   Short term goal 1: Fxl mobility/transfers with min A. Short term goal 2: Toileting with CGA. Short term goal 3: Dressing with min A. Short term goal 4: Bathing min A. Short term goal 5: Grooming with SBA. Patient Goals   Patient goals : \"I want my L side to work\". Therapy Time   Individual Concurrent Group Co-treatment   Time In 0845         Time Out 0930         Minutes 45         Timed Code Treatment Minutes: 39 Minutes     If pt is discharged prior to next OT session, this note will serve as the discharge summary.   Jakob Chavarria OTR/L #[id number]

## 2019-05-14 NOTE — PROGRESS NOTES
II-XII intact, grossly non-focal.  Psychiatric: Alert and oriented, thought content appropriate, normal insight  Capillary Refill: Brisk,< 3 seconds   Peripheral Pulses: +2 palpable, equal bilaterally       Labs:   Recent Labs     05/12/19  1334   WBC 4.9   HGB 13.9   HCT 41.8        Recent Labs     05/12/19  1334      K 4.2      CO2 28   BUN 14   CREATININE 0.7*   CALCIUM 9.2     No results for input(s): AST, ALT, BILIDIR, BILITOT, ALKPHOS in the last 72 hours. Recent Labs     05/12/19  1334   INR 1.33*     Recent Labs     05/12/19  1334   TROPONINI <0.01       Urinalysis:    No results found for: Robley Ravel, BACTERIA, RBCUA, BLOODU, [address], GLUCOSEU    Radiology:  MRI brain without contrast   Final Result   Acute infarct centered in right basal ganglia. No intracranial hemorrhage or   mass effect. The findings were sent to the Radiology Results [address] at 4:51   pm on 5/13/2019to be communicated to a licensed caregiver. XR CHEST PORTABLE   Final Result   No acute cardiopulmonary disease. CT Head WO Contrast   Final Result   Visualization of very subtle area of asymmetric low attenuation in right   centrosylvian/basal ganglia region extending superiorly to the level of the   head of the right caudate nucleus. Finding is suspicious representing subtle   area of acute/recent ischemic change. Follow-up MRI examination with   diffusion imaging would be helpful for more complete evaluation. Critical results were called by Dr. Lloyd Ziegler. Janet Beckford MD to [facility] on 5/12/2019 at 13:43. CTA HEAD W CONTRAST   Preliminary Result   No high-grade stenosis or focal occlusion involving the intracranial or   cervical vasculature. No evidence of acute dissection. No evidence of   aneurysm. Small amount of frothy secretions within the trachea. Possibility of small   volume aspiration is not excluded.   No parenchymal abnormality within the   visualized portion of the lungs. Correlation with a dedicated chest   radiograph is recommended. Ectatic ascending thoracic aorta. No evidence of acute dissection within   visualized thoracic aorta. CTA NECK W CONTRAST   Preliminary Result   No high-grade stenosis or focal occlusion involving the intracranial or   cervical vasculature. No evidence of acute dissection. No evidence of   aneurysm. Small amount of frothy secretions within the trachea. Possibility of small   volume aspiration is not excluded. No parenchymal abnormality within the   visualized portion of the lungs. Correlation with a dedicated chest   radiograph is recommended. Ectatic ascending thoracic aorta. No evidence of acute dissection within   visualized thoracic aorta. Assessment/Plan:    Active Hospital Problems    Diagnosis Date Noted    Acute CVA (cerebrovascular accident) ([facility] [address] [I63.9] 05/12/2019       Acute right basal ganglia CVA - improved  -MRI Brain without contrast pending  -neurology consulted, recs appreciated  -tele monitoring  -patient recently had TTE and loop recorder placed  -cardiology consulted, CARLO today  -PT/OT  -continue Eliquis and statin for now  -permissive HTN for 24 hours     Essential hypertension  -continue home meds    Sickle cell trait  -continue to monitor Hgb      DVT Prophylaxis: Eliquis  Diet: DIET CARDIAC;   Diet NPO Time Specified  Code Status: Full Code    PT/OT Eval Status: ordered, likely needs rehab    Dispo - continue care    Pili Garcia MD

## 2019-05-14 NOTE — ANESTHESIA PRE-OP
H&P Update    I have reviewed the history and physical and examined the patient and find no relevant changes. I have reviewed with the patient and/or family the risks, benefits, and alternatives to the procedure. Pre-sedation Assessment    Patient:  Nahid Blount   :   1955  Intended Procedure: Transesophageal Echocardiogram    [facility] nurse's notes reviewed and agreed. Medications reviewed  Allergies: No Known Allergies      Pre-Procedure Assessment/Plan:  ASA 3 - Patient with moderate systemic disease with functional limitations  Mallampati 2    Level of Sedation Plan: Moderate sedation    Anginal Classification w/in 2 weeks: 0    Heart Failure w/in 2 weeks:  If yes NYHA class: None    Post Procedure plan: Return to same level of care

## 2019-05-15 ENCOUNTER — HOSPITAL ENCOUNTER (INPATIENT)
Age: 64
LOS: 17 days | Discharge: HOME HEALTH | DRG: 045 | End: 2019-06-01
Attending: PHYSICAL MEDICINE & REHABILITATION | Admitting: PHYSICAL MEDICINE & REHABILITATION
Payer: COMMERCIAL

## 2019-05-15 VITALS
HEART RATE: 83 BPM | SYSTOLIC BLOOD PRESSURE: 160 MMHG | BODY MASS INDEX: 20.93 KG/M2 | RESPIRATION RATE: 16 BRPM | TEMPERATURE: 98.1 F | HEIGHT: 72 IN | OXYGEN SATURATION: 97 % | DIASTOLIC BLOOD PRESSURE: 96 MMHG | WEIGHT: 154.54 LBS

## 2019-05-15 PROBLEM — I63.9 RIGHT-SIDED CEREBROVASCULAR ACCIDENT (CVA) (HCC): Status: ACTIVE | Noted: 2019-05-15

## 2019-05-15 LAB
GLUCOSE BLD-MCNC: 141 MG/DL (ref 70–99)
PERFORMED ON: ABNORMAL

## 2019-05-15 PROCEDURE — 85730 THROMBOPLASTIN TIME PARTIAL: CPT

## 2019-05-15 PROCEDURE — 36415 COLL VENOUS BLD VENIPUNCTURE: CPT

## 2019-05-15 PROCEDURE — 1280000000 HC REHAB R&B

## 2019-05-15 PROCEDURE — 2580000003 HC RX 258: Performed by: INTERNAL MEDICINE

## 2019-05-15 PROCEDURE — 6370000000 HC RX 637 (ALT 250 FOR IP): Performed by: NURSE PRACTITIONER

## 2019-05-15 PROCEDURE — 85613 RUSSELL VIPER VENOM DILUTED: CPT

## 2019-05-15 PROCEDURE — 6370000000 HC RX 637 (ALT 250 FOR IP): Performed by: PHYSICAL MEDICINE & REHABILITATION

## 2019-05-15 PROCEDURE — 86146 BETA-2 GLYCOPROTEIN ANTIBODY: CPT

## 2019-05-15 PROCEDURE — 6370000000 HC RX 637 (ALT 250 FOR IP): Performed by: INTERNAL MEDICINE

## 2019-05-15 PROCEDURE — 94760 N-INVAS EAR/PLS OXIMETRY 1: CPT

## 2019-05-15 PROCEDURE — 85610 PROTHROMBIN TIME: CPT

## 2019-05-15 RX ORDER — ACETAMINOPHEN 325 MG/1
650 TABLET ORAL EVERY 4 HOURS PRN
Status: CANCELLED | OUTPATIENT
Start: 2019-05-15

## 2019-05-15 RX ORDER — CLOPIDOGREL BISULFATE 75 MG/1
75 TABLET ORAL DAILY
Status: CANCELLED | OUTPATIENT
Start: 2019-05-16

## 2019-05-15 RX ORDER — HYDRALAZINE HYDROCHLORIDE 25 MG/1
25 TABLET, FILM COATED ORAL EVERY 6 HOURS PRN
Status: CANCELLED | OUTPATIENT
Start: 2019-05-15

## 2019-05-15 RX ORDER — CLOPIDOGREL BISULFATE 75 MG/1
75 TABLET ORAL DAILY
Qty: 30 TABLET | Refills: 3 | Status: ON HOLD | OUTPATIENT
Start: 2019-05-16 | End: 2019-05-31 | Stop reason: SDUPTHER

## 2019-05-15 RX ORDER — ONDANSETRON 4 MG/1
4 TABLET, FILM COATED ORAL EVERY 8 HOURS PRN
Status: CANCELLED | OUTPATIENT
Start: 2019-05-15

## 2019-05-15 RX ORDER — TRAMADOL HYDROCHLORIDE 50 MG/1
100 TABLET ORAL EVERY 4 HOURS PRN
Status: DISCONTINUED | OUTPATIENT
Start: 2019-05-15 | End: 2019-06-01 | Stop reason: HOSPADM

## 2019-05-15 RX ORDER — HYDRALAZINE HYDROCHLORIDE 25 MG/1
25 TABLET, FILM COATED ORAL EVERY 6 HOURS PRN
Status: DISCONTINUED | OUTPATIENT
Start: 2019-05-15 | End: 2019-06-01 | Stop reason: HOSPADM

## 2019-05-15 RX ORDER — ATORVASTATIN CALCIUM 80 MG/1
80 TABLET, FILM COATED ORAL NIGHTLY
Status: CANCELLED | OUTPATIENT
Start: 2019-05-15

## 2019-05-15 RX ORDER — BISACODYL 10 MG
10 SUPPOSITORY, RECTAL RECTAL DAILY PRN
Status: CANCELLED | OUTPATIENT
Start: 2019-05-15

## 2019-05-15 RX ORDER — AMLODIPINE BESYLATE 5 MG/1
5 TABLET ORAL DAILY
Status: CANCELLED | OUTPATIENT
Start: 2019-05-16

## 2019-05-15 RX ORDER — ACETAMINOPHEN 325 MG/1
650 TABLET ORAL EVERY 4 HOURS PRN
Status: DISCONTINUED | OUTPATIENT
Start: 2019-05-15 | End: 2019-06-01 | Stop reason: HOSPADM

## 2019-05-15 RX ORDER — CLOPIDOGREL BISULFATE 75 MG/1
75 TABLET ORAL DAILY
Status: DISCONTINUED | OUTPATIENT
Start: 2019-05-16 | End: 2019-06-01 | Stop reason: HOSPADM

## 2019-05-15 RX ORDER — ASPIRIN 81 MG/1
81 TABLET, CHEWABLE ORAL DAILY
Qty: 30 TABLET | Refills: 3 | Status: ON HOLD | OUTPATIENT
Start: 2019-05-16 | End: 2019-05-31 | Stop reason: SDUPTHER

## 2019-05-15 RX ORDER — SENNA AND DOCUSATE SODIUM 50; 8.6 MG/1; MG/1
2 TABLET, FILM COATED ORAL 2 TIMES DAILY
Status: CANCELLED | OUTPATIENT
Start: 2019-05-15

## 2019-05-15 RX ORDER — TRAMADOL HYDROCHLORIDE 50 MG/1
50 TABLET ORAL EVERY 4 HOURS PRN
Status: DISCONTINUED | OUTPATIENT
Start: 2019-05-15 | End: 2019-06-01 | Stop reason: HOSPADM

## 2019-05-15 RX ORDER — ASPIRIN 81 MG/1
81 TABLET, CHEWABLE ORAL DAILY
Status: DISCONTINUED | OUTPATIENT
Start: 2019-05-16 | End: 2019-06-01 | Stop reason: HOSPADM

## 2019-05-15 RX ORDER — ONDANSETRON 4 MG/1
4 TABLET, FILM COATED ORAL EVERY 8 HOURS PRN
Status: DISCONTINUED | OUTPATIENT
Start: 2019-05-15 | End: 2019-06-01 | Stop reason: HOSPADM

## 2019-05-15 RX ORDER — AMLODIPINE BESYLATE 5 MG/1
10 TABLET ORAL DAILY
Qty: 30 TABLET | Refills: 3 | Status: ON HOLD | OUTPATIENT
Start: 2019-05-15 | End: 2019-05-31 | Stop reason: SDUPTHER

## 2019-05-15 RX ORDER — ATORVASTATIN CALCIUM 80 MG/1
80 TABLET, FILM COATED ORAL NIGHTLY
Status: DISCONTINUED | OUTPATIENT
Start: 2019-05-15 | End: 2019-06-01 | Stop reason: HOSPADM

## 2019-05-15 RX ORDER — AMLODIPINE BESYLATE 5 MG/1
5 TABLET ORAL DAILY
Status: DISCONTINUED | OUTPATIENT
Start: 2019-05-16 | End: 2019-05-16

## 2019-05-15 RX ORDER — SENNA AND DOCUSATE SODIUM 50; 8.6 MG/1; MG/1
2 TABLET, FILM COATED ORAL 2 TIMES DAILY
Status: DISCONTINUED | OUTPATIENT
Start: 2019-05-15 | End: 2019-06-01 | Stop reason: HOSPADM

## 2019-05-15 RX ORDER — ASPIRIN 81 MG/1
81 TABLET, CHEWABLE ORAL DAILY
Status: CANCELLED | OUTPATIENT
Start: 2019-05-16

## 2019-05-15 RX ORDER — BISACODYL 10 MG
10 SUPPOSITORY, RECTAL RECTAL DAILY PRN
Status: DISCONTINUED | OUTPATIENT
Start: 2019-05-15 | End: 2019-06-01 | Stop reason: HOSPADM

## 2019-05-15 RX ADMIN — ASPIRIN 81 MG 81 MG: 81 TABLET ORAL at 10:03

## 2019-05-15 RX ADMIN — AMLODIPINE BESYLATE 5 MG: 5 TABLET ORAL at 10:04

## 2019-05-15 RX ADMIN — CLOPIDOGREL BISULFATE 75 MG: 75 TABLET ORAL at 10:04

## 2019-05-15 RX ADMIN — TRAMADOL HYDROCHLORIDE 100 MG: 50 TABLET, FILM COATED ORAL at 20:35

## 2019-05-15 RX ADMIN — SENNOSIDES,DOCUSATE SODIUM 2 TABLET: 8.6; 5 TABLET, FILM COATED ORAL at 20:35

## 2019-05-15 RX ADMIN — ATORVASTATIN CALCIUM 80 MG: 80 TABLET, FILM COATED ORAL at 20:35

## 2019-05-15 RX ADMIN — Medication 10 ML: at 10:05

## 2019-05-15 ASSESSMENT — PAIN DESCRIPTION - PAIN TYPE: TYPE: ACUTE PAIN

## 2019-05-15 ASSESSMENT — PAIN SCALES - GENERAL
PAINLEVEL_OUTOF10: 0
PAINLEVEL_OUTOF10: 6
PAINLEVEL_OUTOF10: 0
PAINLEVEL_OUTOF10: 0
PAINLEVEL_OUTOF10: 8
PAINLEVEL_OUTOF10: 0

## 2019-05-15 ASSESSMENT — PAIN DESCRIPTION - LOCATION: LOCATION: PELVIS

## 2019-05-15 ASSESSMENT — PAIN DESCRIPTION - DESCRIPTORS: DESCRIPTORS: STABBING

## 2019-05-15 ASSESSMENT — PAIN DESCRIPTION - ORIENTATION: ORIENTATION: RIGHT

## 2019-05-15 NOTE — PROGRESS NOTES
completed yesterday was normal.      Recommendations  1. Anticoagulation has been stopped, as there is not a clear indication for it at this time. 2.  He is now on DAPT (81 mg aspirin, 75 mg Plavix)    3. Continue statin. LDL < 70  4. BP < 140/90  5. Hypercoagulable studies pending. 6.  Patient has loop recorder to monitor for atrial fibrillation. 7.  Rehabilitation efforts. 8.  Follow up with  stroke specialist in ~ 3 months. Will sign off. Please call with any additional questions or concerns. Thank you.       Daphne Parks NP  [address] [place] [address] [address] Neurology    A copy of this note was provided for Dr Alma Delia Palmer MD

## 2019-05-15 NOTE — PROGRESS NOTES
superiorly to the level of the head of the right caudate nucleus (images 26-34). Finding is suspicious representing subtle area of acute/recent ischemic change. No other significant area of abnormal attenuation of brain parenchyma is identified. No abnormal extra-axial fluid collections are identified. ORBITS: The visualized portion of the orbits demonstrate no acute abnormality. SINUSES: The visualized paranasal sinuses and mastoid air cells demonstrate no acute abnormality. There is very minimal mucosal thickening within few ethmoid air cells. Tiny rounded density along anterior right maxillary sinus could represent tiny mucous retention cyst or polyp. SOFT TISSUES/SKULL:  No acute abnormality of the visualized skull or soft tissues. Visualization of very subtle area of asymmetric low attenuation in right centrosylvian/basal ganglia region extending superiorly to the level of the head of the right caudate nucleus. Finding is suspicious representing subtle area of acute/recent ischemic change. Follow-up MRI examination with diffusion imaging would be helpful for more complete evaluation. Critical results were called by Dr. Renaldo Dunn. Bryan Subramanian MD to [facility] on 5/12/2019 at 13:43. Xr Chest Portable    Result Date: 5/12/2019  EXAMINATION: ONE XRAY VIEW OF THE CHEST 5/12/2019 3:38 pm COMPARISON: None. HISTORY: ORDERING SYSTEM PROVIDED HISTORY: Abnormal CTA of neck raising concern for aspiration TECHNOLOGIST PROVIDED HISTORY: Reason for exam:->Abnormal CTA of neck raising concern for aspiration Ordering Physician Provided Reason for Exam: Facial Droop (and left leg weakness. sts dx with cva at [facility] on tuesday Acuity: Acute Type of Exam: Initial FINDINGS: The cardiomediastinal silhouette is of normal size. The lungs are grossly clear. There is no pleural effusion. There is no pneumothorax. There is no acute osseous abnormality. A loop recorder is seen at the left heart border.      No acute right vertebral artery appears smaller than the left. SOFT TISSUES: There is fluid and/or secretions within lower trachea. There is fluid and secretions within thoracic esophagus. Thyroid gland is heterogeneously enlarged with thyroid nodules. Lack of significant fat planes limits evaluation for lymphadenopathy and soft tissue abnormalities. No evidence of acute soft tissue abnormality within neck. BONES: Mild multilevel degenerative changes of the cervical spine. No gross evidence of acute osseous abnormalities. CTA HEAD: ANTERIOR CIRCULATION: The internal carotid arteries are normal in course and caliber without focal stenosis. The anterior cerebral and middle cerebral arteries demonstrate no focal stenosis. POSTERIOR CIRCULATION: The posterior cerebral arteries demonstrate no focal stenosis. The vertebral and basilar arteries appear unremarkable. 1. No high-grade stenosis or focal occlusion involving intracranial or cervical vasculature. No evidence of acute dissection. No evidence of aneurysm. 2. Small amount of frothy secretions and fluid within trachea is nonspecific by imaging alone. Possibility of small volume aspiration is not excluded. Correlation with a dedicated chest imaging is recommended. 3. Ectatic ascending thoracic aorta. No evidence of acute dissection within visualized thoracic aorta. 4. Thyroid gland is heterogeneously enlarged with multiple thyroid nodules. Recommend thyroid ultrasound non emergently. RECOMMENDATIONS: Managing Incidental Thyroid Nodule Detected at CT or MRI or US 2. Follow up thyroid ultrasound also recommend in these scenarios - Solitary nodule with high risk imaging features (locally invasive nodule or suspicious lymph nodes) - Heterogeneous, enlarged thyroid gland. - Increased uptake on PET. Note: These recommendations do not apply to pts. w/ increased risk for thyroid cancer or pts. with symptomatic thyroid disease.

## 2019-05-15 NOTE — DISCHARGE SUMMARY
Hospital Medicine Discharge Summary    Patient ID: Kevin Contreras      Patient's PCP: No primary care provider on file. Admit Date: 5/12/2019     Discharge Date:   5/15/2019    Admitting Physician: Florence Grewal MD     Discharge Physician: Florence Grewal MD     Discharge Diagnoses: Active Hospital Problems    Diagnosis Date Noted    Acute CVA (cerebrovascular accident) ([facility] [address] [I63.9] 05/12/2019       The patient was seen and examined on day of discharge and this discharge summary is in conjunction with any daily progress note from day of discharge. Hospital Course: The patient is a 66 y.o. male with recent CVA, sickle cell trait who presents to [facility] with left sided weakness and facial droop. Pt was recently admitted from 5/7-5/9 with left face, arm, and leg weakness with dysarthria. He was given tPA and his sympotms resolved. He had a full workup including and MRI showing right parietal lobe infarct. He had a loop recorder placed by cardiology. He was discharged on Eliquis 5 mg bID and statin. As patient was leaving the hospital, he was having right arm numbness. Repeat CT Head was negative.     He stated that he woke up at 8AM and noticed a left-sided facial droop and weakness to the left upper and lower extremity. Denies dysarthria or dysphagia. States he has been compliant with his medications.       Acute right basal ganglia CVA - improved  -MRI Brain without contrast showed new acute right basal ganglia infarct  -neurology consulted, recs appreciated; started on DAPT  -tele monitoring  -patient recently had TTE and loop recorder placed  -cardiology consulted, CARLO performed that did not show any evidence of thrombus or shunt  -PT/OT  -continue ASA, Plavix, and statin     Essential hypertension  -continue home meds  -increase Norvasc dose from 5 mg to 10 mg daily for tighter control     Sickle cell trait  -continue to monitor Hgb    Multiple thyroid nodules  -incidentally seen on CT  -non-emergent thyroid ultrasound ordered; pt informed of results        Exam:     BP (!) 145/100   Pulse 75   Temp 97.3 °F (36.3 °C) (Axillary)   Resp 18   Ht 6' (1.829 m)   Wt 154 lb 8.7 oz (70.1 kg)   SpO2 97%   BMI 20.96 kg/m²       General appearance: No apparent distress, appears stated age and cooperative. HEENT: Pupils equal, round, and reactive to light. Conjunctivae/corneas clear. Neck: Supple, with full range of motion. No jugular venous distention. Trachea midline. Respiratory:  Normal respiratory effort. Clear to auscultation, bilaterally without Rales/Wheezes/Rhonchi. Cardiovascular: Regular rate and rhythm with normal S1/S2 without murmurs, rubs or gallops. Abdomen: Soft, non-tender, non-distended with normal bowel sounds. Musculoskeletal: No clubbing, cyanosis or edema bilaterally. Full range of motion without deformity. Skin: Skin color, texture, turgor normal.  No rashes or lesions. Neurologic: Alert and oriented X 3, decreased sensation on the left, left sided facial droop, 4/5 strength upper and lower left extremities, bilaterally.  Cranial nerves: II-XII intact, grossly non-focal.  Psychiatric: Alert and oriented, thought content appropriate, normal insight  Capillary Refill: Brisk,< 3 seconds   Peripheral Pulses: +2 palpable, equal bilaterally            Labs: For convenience and continuity at follow-up the following most recent labs are provided:      CBC:    Lab Results   Component Value Date    WBC 4.9 05/12/2019    HGB 13.9 05/12/2019    HCT 41.8 05/12/2019     05/12/2019       Renal:    Lab Results   Component Value Date     05/12/2019    K 4.2 05/12/2019     05/12/2019    CO2 28 05/12/2019    BUN 14 05/12/2019    CREATININE 0.7 05/12/2019    CALCIUM 9.2 05/12/2019         Significant Diagnostic Studies    Radiology:   MRI brain without contrast   Final Result   Acute infarct centered in right basal ganglia.   No intracranial hemorrhage or mass effect. The findings were sent to the Radiology Results [address] at 4:51   pm on 5/13/2019to be communicated to a licensed caregiver. XR CHEST PORTABLE   Final Result   No acute cardiopulmonary disease. CT Head WO Contrast   Final Result   Visualization of very subtle area of asymmetric low attenuation in right   centrosylvian/basal ganglia region extending superiorly to the level of the   head of the right caudate nucleus. Finding is suspicious representing subtle   area of acute/recent ischemic change. Follow-up MRI examination with   diffusion imaging would be helpful for more complete evaluation. Critical results were called by Dr. Beba Vigil. Fadia Stout MD to [facility] on 5/12/2019 at 13:43. CTA HEAD W CONTRAST   Final Result   1. No high-grade stenosis or focal occlusion involving intracranial or   cervical vasculature. No evidence of acute dissection. No evidence of   aneurysm. 2. Small amount of frothy secretions and fluid within trachea is nonspecific   by imaging alone. Possibility of small volume aspiration is not excluded. Correlation with a dedicated chest imaging is recommended. 3. Ectatic ascending thoracic aorta. No evidence of acute dissection within   visualized thoracic aorta. 4. Thyroid gland is heterogeneously enlarged with multiple thyroid nodules. Recommend thyroid ultrasound non emergently. RECOMMENDATIONS:   Managing Incidental Thyroid Nodule Detected at CT or MRI or US      2. Follow up thyroid ultrasound also recommend in these scenarios      - Solitary nodule with high risk imaging features (locally invasive nodule or   suspicious lymph nodes)      - Heterogeneous, enlarged thyroid gland.      - Increased uptake on PET. Note: These recommendations do not apply to pts. w/ increased risk      for thyroid cancer or pts. with symptomatic thyroid disease.

## 2019-05-15 NOTE — DISCHARGE INSTR - COC
Continuity of Care Form    Patient Name: Eliz Tapia   :    MRN:  [id number]    Admit date:  2019  Discharge date:  5/15/2019    Code Status Order: Full Code   Advance Directives:   [facility] Documentation     Date/Time Healthcare Directive Type of Healthcare Directive Copy in [facility] [address] Agent's Name Healthcare Agent's Phone Number    [phone]  No, patient does not have an advance directive for healthcare treatment -- -- -- -- --    [phone]  No, patient does not have an advance directive for healthcare treatment -- -- -- -- --          Admitting Physician:  Shakeel Florian MD  PCP: No primary care provider on file. Discharging Nurse: [facility] Unit/Room#: C5T-3163/8054-47  Discharging Unit Phone Number: [phone]    Emergency Contact:   Extended Emergency Contact Information  Primary Emergency Contact: Mireya malik [address] Phone: [phone]  Work Phone: [phone]  Mobile Phone: [phone]  Relation: Child  Secondary Emergency Contact: lisy malik  Mobile Phone: [phone]  Relation: Child    Past Surgical History:  History reviewed. No pertinent surgical history. Immunization History: There is no immunization history on file for this patient.     Active Problems:  Patient Active Problem List   Diagnosis Code    Acute CVA (cerebrovascular accident) ([facility] [place] [address] I63.9       Isolation/Infection:   Isolation          No Isolation            Nurse Assessment:  Last Vital Signs: BP (!) 145/100   Pulse 75   Temp 97.3 °F (36.3 °C) (Axillary)   Resp 18   Ht 6' (1.829 m)   Wt 154 lb 8.7 oz (70.1 kg)   SpO2 97%   BMI 20.96 kg/m²     Last documented pain score (0-10 scale): Pain Level: 0  Last Weight:   Wt Readings from Last 1 Encounters:   05/15/19 154 lb 8.7 oz (70.1 kg)     Mental Status:  oriented, alert, thought processes intact and able to concentrate and follow Manager/ signature: {Esignature:[id number]}    PHYSICIAN SECTION    Prognosis: Good    Condition at Discharge: Stable    Rehab Potential (if transferring to Rehab): Good    Recommended Labs or Other Treatments After Discharge: PT/OT, DAPT therapy, follow-up results of hypercoagulable workup    Physician Certification: I certify the above information and transfer of Tatiana Yenny  is necessary for the continuing treatment of the diagnosis listed and that he requires Acute Rehab for less 30 days.      Update Admission H&P: No change in H&P    PHYSICIAN SIGNATURE:  Electronically signed by Tessa Lowery MD on 5/15/19 at 10:27 AM

## 2019-05-16 LAB
ANION GAP SERPL CALCULATED.3IONS-SCNC: 11 MMOL/L (ref 3–16)
ANTICARDIOLIPIN IGG ANTIBODY: 1 GPL (ref 0–14)
BETA-2 GLYCOPROTEIN 1 IGG ANTIBODY: 0 SGU (ref 0–20)
BETA-2 GLYCOPROTEIN 1 IGM ANTIBODY: 4 SMU (ref 0–20)
BUN BLDV-MCNC: 15 MG/DL (ref 7–20)
CALCIUM SERPL-MCNC: 9 MG/DL (ref 8.3–10.6)
CARDIOLIPIN AB IGM: 0 MPL (ref 0–12)
CHLORIDE BLD-SCNC: 101 MMOL/L (ref 99–110)
CO2: 25 MMOL/L (ref 21–32)
CREAT SERPL-MCNC: 0.7 MG/DL (ref 0.8–1.3)
EKG ATRIAL RATE: 70 BPM
EKG DIAGNOSIS: NORMAL
EKG P AXIS: 75 DEGREES
EKG P-R INTERVAL: 140 MS
EKG Q-T INTERVAL: 402 MS
EKG QRS DURATION: 102 MS
EKG QTC CALCULATION (BAZETT): 434 MS
EKG R AXIS: -70 DEGREES
EKG T AXIS: 64 DEGREES
EKG VENTRICULAR RATE: 70 BPM
GFR AFRICAN AMERICAN: >60
GFR NON-AFRICAN AMERICAN: >60
GLUCOSE BLD-MCNC: 105 MG/DL (ref 70–99)
HCT VFR BLD CALC: 45.8 % (ref 40.5–52.5)
HEMOGLOBIN: 15.3 G/DL (ref 13.5–17.5)
MAGNESIUM: 2.1 MG/DL (ref 1.8–2.4)
MCH RBC QN AUTO: 29.2 PG (ref 26–34)
MCHC RBC AUTO-ENTMCNC: 33.4 G/DL (ref 31–36)
MCV RBC AUTO: 87.2 FL (ref 80–100)
PDW BLD-RTO: 14 % (ref 12.4–15.4)
PLATELET # BLD: 274 K/UL (ref 135–450)
PMV BLD AUTO: 7.7 FL (ref 5–10.5)
POTASSIUM REFLEX MAGNESIUM: 3.9 MMOL/L (ref 3.5–5.1)
PROTHROMBIN G20210A MUTATION: NEGATIVE
PT PCR SPECIMEN: NORMAL
RBC # BLD: 5.26 M/UL (ref 4.2–5.9)
SODIUM BLD-SCNC: 137 MMOL/L (ref 136–145)
WBC # BLD: 5 K/UL (ref 4–11)

## 2019-05-16 PROCEDURE — 97530 THERAPEUTIC ACTIVITIES: CPT

## 2019-05-16 PROCEDURE — 97112 NEUROMUSCULAR REEDUCATION: CPT

## 2019-05-16 PROCEDURE — 36415 COLL VENOUS BLD VENIPUNCTURE: CPT

## 2019-05-16 PROCEDURE — 97116 GAIT TRAINING THERAPY: CPT

## 2019-05-16 PROCEDURE — 94760 N-INVAS EAR/PLS OXIMETRY 1: CPT

## 2019-05-16 PROCEDURE — 92610 EVALUATE SWALLOWING FUNCTION: CPT

## 2019-05-16 PROCEDURE — 97162 PT EVAL MOD COMPLEX 30 MIN: CPT

## 2019-05-16 PROCEDURE — 6370000000 HC RX 637 (ALT 250 FOR IP): Performed by: PHYSICAL MEDICINE & REHABILITATION

## 2019-05-16 PROCEDURE — 92523 SPEECH SOUND LANG COMPREHEN: CPT

## 2019-05-16 PROCEDURE — 1280000000 HC REHAB R&B

## 2019-05-16 PROCEDURE — 97535 SELF CARE MNGMENT TRAINING: CPT

## 2019-05-16 PROCEDURE — 97110 THERAPEUTIC EXERCISES: CPT

## 2019-05-16 PROCEDURE — 80048 BASIC METABOLIC PNL TOTAL CA: CPT

## 2019-05-16 PROCEDURE — 85027 COMPLETE CBC AUTOMATED: CPT

## 2019-05-16 PROCEDURE — 83735 ASSAY OF MAGNESIUM: CPT

## 2019-05-16 PROCEDURE — 97166 OT EVAL MOD COMPLEX 45 MIN: CPT

## 2019-05-16 RX ORDER — LIDOCAINE 4 G/G
1 PATCH TOPICAL DAILY
Status: DISCONTINUED | OUTPATIENT
Start: 2019-05-16 | End: 2019-06-01 | Stop reason: HOSPADM

## 2019-05-16 RX ORDER — AMLODIPINE BESYLATE 10 MG/1
10 TABLET ORAL DAILY
Status: DISCONTINUED | OUTPATIENT
Start: 2019-05-17 | End: 2019-06-01 | Stop reason: HOSPADM

## 2019-05-16 RX ADMIN — ASPIRIN 81 MG 81 MG: 81 TABLET ORAL at 08:12

## 2019-05-16 RX ADMIN — AMLODIPINE BESYLATE 5 MG: 5 TABLET ORAL at 08:12

## 2019-05-16 RX ADMIN — TRAMADOL HYDROCHLORIDE 50 MG: 50 TABLET, FILM COATED ORAL at 08:24

## 2019-05-16 RX ADMIN — TRAMADOL HYDROCHLORIDE 100 MG: 50 TABLET, FILM COATED ORAL at 12:31

## 2019-05-16 RX ADMIN — SENNOSIDES,DOCUSATE SODIUM 2 TABLET: 8.6; 5 TABLET, FILM COATED ORAL at 20:36

## 2019-05-16 RX ADMIN — ATORVASTATIN CALCIUM 80 MG: 80 TABLET, FILM COATED ORAL at 20:36

## 2019-05-16 RX ADMIN — CLOPIDOGREL BISULFATE 75 MG: 75 TABLET, FILM COATED ORAL at 08:12

## 2019-05-16 ASSESSMENT — 9 HOLE PEG TEST
TEST_RESULT: NOT TESTED
TEST_RESULT: IMPAIRED
TESTTIME_SECONDS: 30.2

## 2019-05-16 ASSESSMENT — PAIN DESCRIPTION - PROGRESSION
CLINICAL_PROGRESSION: NOT CHANGED

## 2019-05-16 ASSESSMENT — PAIN DESCRIPTION - FREQUENCY
FREQUENCY: CONTINUOUS

## 2019-05-16 ASSESSMENT — PAIN DESCRIPTION - ORIENTATION
ORIENTATION: RIGHT

## 2019-05-16 ASSESSMENT — PAIN DESCRIPTION - DESCRIPTORS
DESCRIPTORS: ACHING;DISCOMFORT
DESCRIPTORS: ACHING
DESCRIPTORS: ACHING;SORE

## 2019-05-16 ASSESSMENT — PAIN DESCRIPTION - ONSET
ONSET: ON-GOING

## 2019-05-16 ASSESSMENT — PAIN DESCRIPTION - LOCATION
LOCATION: BACK

## 2019-05-16 ASSESSMENT — PAIN SCALES - GENERAL
PAINLEVEL_OUTOF10: 6
PAINLEVEL_OUTOF10: 7
PAINLEVEL_OUTOF10: 6
PAINLEVEL_OUTOF10: 4
PAINLEVEL_OUTOF10: 5

## 2019-05-16 ASSESSMENT — PAIN DESCRIPTION - PAIN TYPE
TYPE: ACUTE PAIN

## 2019-05-16 ASSESSMENT — PAIN - FUNCTIONAL ASSESSMENT: PAIN_FUNCTIONAL_ASSESSMENT: ACTIVITIES ARE NOT PREVENTED

## 2019-05-16 NOTE — PROGRESS NOTES
Recommendations:  Requires SLP Intervention: Yes  Duration/Frequency of Treatment: 5x/wk x2-3wks  D/C Recommendations: To be determined     Plan:   Goals:  Short-term Goals  Timeframe for Short-term Goals: 2-3wks  Goal 1: The patient will improve speech intelligibility to >90% in conversation via compensatory strategy use and oral motor/vocal strengthening. Goal 2: The patient will improve recall for 5 new units over 30 min intervals to >85% with min cues  Goal 3: The patient will demonstrate functional planning and organization for functional calculations, money and finance management, med management with intermittent to min cues    Patient/family involved in developing goals and treatment plan: yes    Subjective:   Previous level of function and limitations:   General  Chart Reviewed: Yes  Patient assessed for rehabilitation services?: Yes  Family / Caregiver Present: No  Subjective  Subjective: pleasant and cooperative, upright in bed for evaluation  Social/Functional History  Lives With: Significant other  Type of Home: Apartment  Home Layout: One level  Home Access: Level entry  ADL Assistance: Independent  Homemaking Responsibilities: (Sig other completes cooking, cleaning, and laundry; share grocery shopping )  Ambulation Assistance: Independent  Transfer Assistance: Independent  Active : Yes  Type of occupation: remodel houses   Additional Comments: Patient reports one fall since his stroke the past few days.  Patient currently remodeling his house which is 3 [address] enter house, 1 level, 1 tub/shower   Vision  Vision: Within Functional Limits  Hearing  Hearing: Within functional limits        Objective:     Oral/Motor  Oral Motor: Exceptions to [facility]  Labial ROM: Reduced left  Labial Symmetry: Abnormal symmetry left  Labial Strength: Reduced  Labial Coordination: Reduced  Lingual ROM: Reduced left  Lingual Symmetry: Abnormal symmetry left  Lingual Strength: Reduced  Lingual Coordination:

## 2019-05-16 NOTE — PROGRESS NOTES
Awake resting in bed without complaints or needs, he demonstrated now how he could raise the left leg and left arm without difficulty. Continent of bladder voiding per urinal in bed.  Gallo Burgess RN

## 2019-05-16 NOTE — PROGRESS NOTES
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 Patient currently remodeling his house which is 3 [address] enter house, 1 level, 1 tub/shower        Objective   Vision: Within Functional Limits  Hearing: Within functional limits    Orientation  Overall Orientation Status: Within Functional Limits     Toilet Transfers  Toilet - Technique: Marya pedroza)  Equipment Used: Standard toilet(Comfort ht toilet)  Toilet Transfer: Dependent/Total  Toilet Transfers Comments: Stedy <> toilet dep D/T stedy used, FIM 1  Tub Transfers  Tub Transfers Comments: n/a  Shower Transfers  Shower - Transfer From: Other(Ava stedy into shower)  Shower - Transfer Type: To  Shower - Transfer To: Shower seat with back  Shower - Technique: Stand pivot(Ava stedy in, pivot out)  Shower Transfers: Dependent  Shower Transfers Comments: Stand pivot with mod A out of shower  Wheelchair Bed Transfers  Wheelchair/Bed - Technique: Stand step  Equipment Used: Wheelchair  Level of Asssistance: Moderate assistance  Wheelchair Transfers Comments: Shower chair with back > w/c with mod A for balance and to prevent L knee from buckling. ADL  Grooming: Setup(Washed hands and face seated in shower. Brushed teeth and combed hair seated at sink. )  UE Bathing: Moderate assistance;Setup(Seated in shower chair. Hand held shower head. Assisted with both arm pits, R arm, and  L shoulder. )  LE Bathing: Maximum assistance;Setup(Assisted with washing L foot and drying both feet with pt seated.  Assisted with hiram-anal hygiene with pt in stance; pt relied on grab bar for stability. )  UE Dressing: Minimal

## 2019-05-16 NOTE — PROGRESS NOTES
C/O pain right lateral side approximately an inch up from pelvis, he states it occurred last evening and didn't get much relief with the Tylenol requesting something stronger, Dr. Agatha Crowe notified and orders received.

## 2019-05-16 NOTE — PLAN OF CARE
Complete education with patient/family with understanding demonstrated for:  [x] Adjustment   [x] Other: CVA, smoking cessation, communication needs. Nursing interventions may include bowel/bladder training, education for medical assistive devices, medication education, O2 saturation management, energy conservation, stress management techniques, fall prevention, alarms protocol, seating and positioning, skin/wound care, pressure relief instruction,dressing changes,  infection protection, DVT prophylaxis, and/or assistance with in room safety with transfers to bed, toilet, wheelchair, shower as well as bathroom activities and hygiene. Patient/caregiver education for:   [x] Disease/sustained injury/management      [x] Medication Use   [x] Surgical intervention   [x] Safety   [x] Body mechanics and or joint protection   [x] Health maintenance         PHYSICAL THERAPY:  Goals:                  Short term goals  Time Frame for Short term goals: 1 week  Short term goal 1: sit <-> stand transfers CGA  Short term goal 2: ambulate 150 ft with LRAD CGA   Short term goal 3: ascend/descend 4 steps unilateral railing with assistance   Short term goal 4: ascend/descend curb step with LRAD and assistance. Long term goals  Time Frame for Long term goals : 2-3 weeks   Long term goal 1: sit <-> stand transfer mod I   Long term goal 2: ambulate with LRAD 200' mod I   Long term goal 3: ascend/descend 12 steps unilateral railing mod I   Long term goal 4: ascend/descend curb step with LRAD mod I  These goals were reviewed with this patient at the time of assessment and Aniceto Officer is in agreement. Plan of Care: Pt to be seen 90   mins per day for 5 day/week 2-3 weeks.                    Current Treatment Recommendations: Strengthening, Balance Training, Functional Mobility Training, Transfer Training, ROM, Endurance Training, Wheelchair Mobility Training, Neuromuscular Re-education, Stair training, ADL/Self-care Training, Gait Training, Home Exercise Program, Safety Education & Training, Positioning, Equipment Evaluation, Education, & procurement, Patient/Caregiver Education & Training      OCCUPATIONAL THERAPY:  Goals:             Short term goals  Time Frame for Short term goals: 1 week   Short term goal 1: Pt will perform bathing with min A and shower chair. Short term goal 2: Pt will perform upper body dressing SBA and lower body dressing min A except Eric Hose. Short term goal 3: Pt will perform toileting with CGA and grab bars PRN. Short term goal 4: Pt will perform functional mobility / transfers with CGA and LRAD. Short term goal 5: Pt will improve LUE AROM-HEP / strength / coordination through there ex / neuro re-ed to actively use for bilateral ADL tasks 25% of time. :  Long term goals  Time Frame for Long term goals : 2-3 weeks   Long term goal 1: Pt will perform bathing with mod ind and shower chair   Long term goal 2: Pt will perform dressing with mod ind  Long term goal 3: Pt will perform toileting with mod ind  Long term goal 4: Pt will perform functional mobility / transfers with mod ind and LRAD   Long term goal 5: Pt will improve LUE AROM-HEP / strength / coordination through there ex / neuro re-ed to actively use for bilateral ADL tasks 50% of time. Long term goals 6: Pt will improve balance, L side awareness and activity tolerance to perform object retrieval / transport for simple IADL tasks with supervision.  :    These goals were reviewed with this patient at the time of assessment and Regina Lester is in agreement    Plan of Care:  Pt to be seen 90 mins per day for 5 days/week 2-3 weeks. Patient Education: Role of OT, transfer training, functional mobility, safety. Discussed goals / plan with pt and pt agreeable. SPEECH THERAPY: Goals will be left blank if speech is not following this patient.   Goals:             Short-term Goals  Timeframe for Short-term Goals: 2wks Dysphagia Goals: The patient will tolerate recommended diet without observed clinical signs of aspiration, The patient will improve oral motor function via therapeutic oral motor exercises to 5/5 each trial., The patient will improve oral preparation phase via bolus control/manipulation exercises to 5/5 each trial., The patient/caregiver will demonstrate understanding of compensatory strategies for improved swallowing safety. (The patient will improve pharyngea phase via pharyngloarygnela exercises 5/5 each for increased swallow safety with PO diet.)                            Short-term Goals  Timeframe for Short-term Goals: 2-3wks  Goal 1: The patient will improve speech intelligibility to >90% in conversation via compensatory strategy use and oral motor/vocal strengthening. Goal 2: The patient will improve recall for 5 new units over 30 min intervals to >85% with min cues  Goal 3: The patient will demonstrate functional planning and organization for functional calculations, money and finance management, med management with intermittent to min cues               Timeframe for Short-term Goals: 2-3wks  These goals were reviewed with this patient at the time of assessment and Meg Winchester is in agreement    Plan of Care: Pt to be seen   mins per day for 45 day/week 2-3 weeks. CASE MANAGEMENT:  Goals:   Assist patient/family with discharge planning, patient/family counseling,   and coordination with insurance during ARU stay.       Admission Period/Goal FIM SCORES  FIM Admit/Goal Score   Eating/Swallowing 5/7   Grooming 5/6   Bathing 3/6   Upper Body Dressing 4/6   Lower Body Dressing 1/6   Toileting 0/6   Bladder Mary Lou, Accidents = FIM 5/6   Bowel  Mary Lou, Accidents = FIM 6/6   Bed/Chair Transfer 1/6   Toilet Transfer 1/6   Tub/Shower Transfer  1/6   Locomotion:  Ambulation (Walk) / Wheelchair:  W = walk , w/c = wheelchair  Distance:   1= 0-49 ft , 2=  ft, 3= 150+ ft Distance: 1   Level of Assist: 1

## 2019-05-16 NOTE — PROGRESS NOTES
environment. Impression  Dysphagia Diagnosis: Mild oral stage dysphagia;Mild pharyngeal stage dysphagia  Dysphagia Impression : Mild oropharyngeal dysphagia 2/2 left sided OM weakness and decreased tone resulting in decreased AP transit and concern for premature spillage, minimal oral residue. Pt reports increased labor and some fatigue at meals, but consistent intake with regular textures, and is able to order appropriate textures from select menu . Pt takes consecutive sips of thin liquid via straw with no overt s/s at bedside this date, but is reporting occasional coughing with liquids. Dysphagia Outcome Severity Scale: Level 5: Mild dysphagia- Distant supervision. May need one diet consistency restricted     Treatment Plan  Requires SLP Intervention: Yes  Duration/Frequency of Treatment: 5x/wk x2-3wks  D/C Recommendations: To be determined     Recommended Diet and Intervention  Diet Solids Recommendation: Regular  Liquid Consistency Recommendation: Thin  Recommended Form of Meds: PO     Therapeutic Interventions: Bolus control exercises; Patient/Family education;Diet tolerance monitoring; Tongue base strengthening;Jennifer;Oral motor exercises    Compensatory Swallowing Strategies  Compensatory Swallowing Strategies: Small bites/sips; Lingual sweep; Check for pocketing of food on the Left;Remain upright for 30-45 minutes after meals;Upright as possible for all oral intake    Treatment/Goals  Short-term Goals  Timeframe for Short-term Goals: 2wks  Dysphagia Goals: The patient will tolerate recommended diet without observed clinical signs of aspiration; The patient will improve oral motor function via therapeutic oral motor exercises to 5/5 each trial.;The patient will improve oral preparation phase via bolus control/manipulation exercises to 5/5 each trial.;The patient/caregiver will demonstrate understanding of compensatory strategies for improved swallowing safety. (The patient will improve pharyngea phase via pharyngloarygnela exercises 5/5 each for increased swallow safety with PO diet.)    General  Chart Reviewed: Yes  Behavior/Cognition: Alert; Cooperative;Pleasant mood  Respiratory Status: Room air  Communication Observation: Dysarthria  Follows Directions: Complex  Dentition: Adequate  Patient Positioning: Upright in bed  Consistencies Administered: Reg solid; Thin - straw     Vision/Hearing  Vision  Vision: Within Functional Limits  Hearing  Hearing: Within functional limits    Oral Motor Deficits  Oral/Motor  Oral Motor: Exceptions to [facility]  Labial ROM: Reduced left  Labial Symmetry: Abnormal symmetry left  Labial Strength: Reduced  Labial Coordination: Reduced  Lingual ROM: Reduced left  Lingual Symmetry: Abnormal symmetry left  Lingual Strength: Reduced  Lingual Coordination: Reduced    Oral Phase Dysfunction  Oral Phase  Oral Phase: Exceptions  Oral Phase Dysfunction  Impaired Mastication: Reg Solid(slightly prolonged but adequate)  Decreased Anterior to Posterior Transit: All  Suspected Premature Bolus Loss: All  Lingual/Palatal Residue: Reg solid(minimal)  Oral Phase  Oral Phase - Comment: L labial, buccal and lingual weakness/tone affect mastication, bolus formation and oral clearance     Indicators of Pharyngeal Phase Dysfunction   Pharyngeal Phase  Pharyngeal Phase: Exceptions  Indicators of Pharyngeal Phase Dysfunction  Decreased Laryngeal Elevation: All(potentially mildly decreased)  Pharyngeal Phase   Pharyngeal: No coughing or immediate s/s of aspiration noted    Prognosis  Prognosis  Prognosis for safe diet advancement: excellent  Individuals consulted  Consulted and agree with results and recommendations: Patient    Education  Patient Education: recs and goals and CST  Patient Education Response: Verbalizes understanding          Therapy Time  SLP Individual Minutes  Time In: 0930  Time Out: 1000  Minutes: [address], [place], CCC-SLP #[id number]  5/16/2019 10:20 AM

## 2019-05-16 NOTE — H&P
rehabilitation services cannot be  safely provided at a lower level of care such as a skilled nursing facility. I have compared the patients medical and functional status at the time of the  preadmission screening and the same on this date, and there are no significant changes. By signing this document, I acknowledge that I have personally performed a  full physical examination on this patient within 24 hours of admission to  this inpatient rehabilitation facility and have determined the patient to be  able to tolerate the above course of treatment at an intensive level for a  reasonable period of time. I will be completing a detailed individualized  Plan of Care for this patient by day four of the patients stay based upon the  Preadmission Screen, this Post-Admission Evaluation, and the therapy  evaluations. Assessment and Plan:      Acute right basal ganglia CVA --MRI Brain showed new acute right basal ganglia infarct,neurology consulted, pt started on DAPT     Essential hypertension-Norvasc     Sickle cell trait-continue to monitor Hgb     Multiple thyroid nodules-incidentally seen on CT-non-emergent thyroid ultrasound ordered; pt informed of results     Bowels: Schedule Miralax + Senna S. Follow bowel movements. Enema or suppository if needed. Bladder: Check PVR x 3. [address] if PVR > 200ml or if any volume is > 500 ml. Pain: Tramadol is ordered prn.        Roseanne Silver MD, 5/16/2019, 7:38 AM

## 2019-05-17 LAB
DRVVT CONFIRMATION TEST: NORMAL RATIO
DRVVT SCREEN: 33 SEC (ref 33–44)
DRVVT,DIL: NORMAL SEC (ref 33–44)
FACTOR V LEIDEN: NEGATIVE
HEXAGONAL PHOSPHOLIPID NEUTRALIZAT TEST: NORMAL
LUPUS ANTICOAG INTERP: NORMAL
PLT NEUTA: NORMAL
PT D: 13.4 SEC (ref 12–15.5)
PTT D: 41 SEC (ref 32–48)
PTT-D CORR REFLEX: NORMAL SEC (ref 32–48)
PTT-HEPARIN NEUTRALIZED: NORMAL SEC (ref 32–48)
REPTILASE TIME: NORMAL SEC
SPECIMEN: NORMAL
THROMBIN TIME: NORMAL SEC (ref 14.7–19.5)

## 2019-05-17 PROCEDURE — 97535 SELF CARE MNGMENT TRAINING: CPT

## 2019-05-17 PROCEDURE — 97530 THERAPEUTIC ACTIVITIES: CPT

## 2019-05-17 PROCEDURE — 94760 N-INVAS EAR/PLS OXIMETRY 1: CPT

## 2019-05-17 PROCEDURE — 97127 HC SP THER IVNTJ W/FOCUS COG FUNCJ: CPT

## 2019-05-17 PROCEDURE — 97112 NEUROMUSCULAR REEDUCATION: CPT

## 2019-05-17 PROCEDURE — 1280000000 HC REHAB R&B

## 2019-05-17 PROCEDURE — 97110 THERAPEUTIC EXERCISES: CPT

## 2019-05-17 PROCEDURE — 6370000000 HC RX 637 (ALT 250 FOR IP): Performed by: PHYSICAL MEDICINE & REHABILITATION

## 2019-05-17 PROCEDURE — 92507 TX SP LANG VOICE COMM INDIV: CPT

## 2019-05-17 PROCEDURE — 97116 GAIT TRAINING THERAPY: CPT

## 2019-05-17 PROCEDURE — 92526 ORAL FUNCTION THERAPY: CPT

## 2019-05-17 RX ADMIN — TRAMADOL HYDROCHLORIDE 100 MG: 50 TABLET, FILM COATED ORAL at 08:30

## 2019-05-17 RX ADMIN — TRAMADOL HYDROCHLORIDE 100 MG: 50 TABLET, FILM COATED ORAL at 12:46

## 2019-05-17 RX ADMIN — HYDRALAZINE HYDROCHLORIDE 25 MG: 25 TABLET ORAL at 20:56

## 2019-05-17 RX ADMIN — ATORVASTATIN CALCIUM 80 MG: 80 TABLET, FILM COATED ORAL at 20:45

## 2019-05-17 RX ADMIN — SENNOSIDES,DOCUSATE SODIUM 2 TABLET: 8.6; 5 TABLET, FILM COATED ORAL at 08:33

## 2019-05-17 RX ADMIN — SENNOSIDES,DOCUSATE SODIUM 2 TABLET: 8.6; 5 TABLET, FILM COATED ORAL at 20:45

## 2019-05-17 RX ADMIN — ASPIRIN 81 MG 81 MG: 81 TABLET ORAL at 08:31

## 2019-05-17 RX ADMIN — CLOPIDOGREL BISULFATE 75 MG: 75 TABLET, FILM COATED ORAL at 08:31

## 2019-05-17 RX ADMIN — TRAMADOL HYDROCHLORIDE 100 MG: 50 TABLET, FILM COATED ORAL at 19:10

## 2019-05-17 RX ADMIN — AMLODIPINE BESYLATE 10 MG: 10 TABLET ORAL at 08:31

## 2019-05-17 ASSESSMENT — PAIN DESCRIPTION - ONSET
ONSET: ON-GOING

## 2019-05-17 ASSESSMENT — PAIN DESCRIPTION - PAIN TYPE
TYPE: CHRONIC PAIN

## 2019-05-17 ASSESSMENT — PAIN DESCRIPTION - FREQUENCY
FREQUENCY: CONTINUOUS

## 2019-05-17 ASSESSMENT — PAIN SCALES - GENERAL
PAINLEVEL_OUTOF10: 0
PAINLEVEL_OUTOF10: 7
PAINLEVEL_OUTOF10: 7
PAINLEVEL_OUTOF10: 8
PAINLEVEL_OUTOF10: 5

## 2019-05-17 ASSESSMENT — PAIN DESCRIPTION - DESCRIPTORS
DESCRIPTORS: ACHING

## 2019-05-17 ASSESSMENT — PAIN DESCRIPTION - LOCATION
LOCATION: BACK

## 2019-05-17 ASSESSMENT — PAIN DESCRIPTION - ORIENTATION
ORIENTATION: RIGHT
ORIENTATION: RIGHT

## 2019-05-17 ASSESSMENT — PAIN DESCRIPTION - PROGRESSION
CLINICAL_PROGRESSION: NOT CHANGED
CLINICAL_PROGRESSION: NOT CHANGED

## 2019-05-17 NOTE — PROGRESS NOTES
cues for proper sequencing very loud gait due to poor stance on LLE hitting RLE quickly to floor but able to correct with VC. Min-mod assist with turns due to poor attendance with L side of body. Increased time to perform. Returned to room at completion of session. Pt 5' from w/c to recliner with RW increased time to perform with min-mod assist due to increased fatigue. AFO donned throughout treatment session. A: Pt demonstrated increased fatigue this afternoon but continues to participate well with PT treatment session. Increased time to complete and recover from mobility tasks. Safety Device - Type of devices:  [x]  All fall risk precautions in place [] Bed alarm in place  [x] Call light within reach [x] Chair alarm in place [] Positioning belt [x] Gait belt [x] Patient at risk for falls [] Left in bed [x] Left in chair [] Telesitter in use [] Sitter present [] Nurse notified []  None      Assessment   Body structures, Functions, Activity limitations: Decreased functional mobility ; Decreased endurance;Decreased ROM; Decreased strength;Decreased safe awareness;Decreased balance;Decreased coordination;Decreased fine motor control;Decreased sensation  Assessment: Pt demonstrating improvement with ambulation distances this date with use of DF assist to L ankle, pt continues demosntrated impulsive nature throughout session with all functional tasks requires assistance and cues for safety. Typically unsafe due to poor positioning of LLE during all mobiltiy tasks. Continues to require moderate assistance for ambulation and mod-max for stairs. Pt benefit from home PT to promote increased activation of L side body with functional mobility tasks, increased ability to complete transfers with decreased assistance, improve higher level balance, and improve ambulation to promote ability to return home.    Treatment Diagnosis: Difficulty with ambulation/decrease strength secondary to L sided weakness   Prognosis: Good  Patient Education: use of AFO for assistance with mobility tasks, promoting use of left side with all transfers and walking with RW   REQUIRES PT FOLLOW UP: Yes  Activity Tolerance  Activity Tolerance: Patient Tolerated treatment well;Patient limited by endurance; Other  Activity Tolerance: limited due to L sided weakness      Goals  Short term goals  Time Frame for Short term goals: 1 week  Short term goal 1: sit <-> stand transfers CGA  Short term goal 2: ambulate 150 ft with LRAD CGA   Short term goal 3: ascend/descend 4 steps unilateral railing with assistance   Short term goal 4: ascend/descend curb step with LRAD and assistance. Long term goals  Time Frame for Long term goals : 2-3 weeks   Long term goal 1: sit <-> stand transfer mod I   Long term goal 2: ambulate with LRAD 200' mod I   Long term goal 3: ascend/descend 12 steps unilateral railing mod I   Long term goal 4: ascend/descend curb step with LRAD mod I  Patient Goals   Patient goals : \"get well, get finished with my house remodel and retire\"     Plan    Plan  Times per week: 5-6X   Times per day: Twice a day  Plan weeks: 2-3  Current Treatment Recommendations: Strengthening, Balance Training, Functional Mobility Training, Transfer Training, ROM, Endurance Training, Wheelchair Mobility Training, Neuromuscular Re-education, Stair training, ADL/Self-care Training, Gait Training, Home Exercise Program, Safety Education & Training, Positioning, Equipment Evaluation, Education, & procurement, Patient/Caregiver Education & Training  Safety Devices  Type of devices:  All fall risk precautions in place, Gait belt, Patient at risk for falls, Left in chair(return to room with transportation )  Restraints  Initially in place: No     Therapy Time   Individual Concurrent Group Co-treatment   Time In 0930         Time Out 1030         Minutes 60         Timed Code Treatment Minutes: Clary 61 Time     Individual Co-treatment   Time

## 2019-05-17 NOTE — PROGRESS NOTES
All hypercoag labs are unremarkable   I do not see a need to change his anticoagulation as he does not have antiphospholipid ab syndrome  anticoag recs made by neurology  We will sign off

## 2019-05-17 NOTE — PLAN OF CARE
Problem: Risk for Impaired Skin Integrity  Goal: Tissue integrity - skin and mucous membranes  Description  Structural intactness and normal physiological function of skin and  mucous membranes. 5/17/2019 0129 by Dayron Smith RN  Outcome: Ongoing  Note:   Skin assessment performed. Carlos score of 17. Assist with repositioning as needed. Heels elevated while in chair/bed. Surgical incision to L chest wall is INGE, clean, dry and intact with steri strips. Problem: Falls - Risk of:  Goal: Will remain free from falls  Description  Will remain free from falls  5/17/2019 0129 by Dayron Smith RN  Outcome: Ongoing  Note:   Fall precautions in place. Del Cid score: 70. Pt transfers by stand/pivot to wheelchair x1. Bed locked in lowest position, nonskid socks on, bed/chair alarm active. Call light and belongings within reach. Continue hourly rounding. Problem: Pain:  Goal: Control of acute pain  Description  Control of acute pain  5/17/2019 0129 by Dayron Smith RN  Outcome: Ongoing  Note:   Pt. able to communicate pain needs based on 0-10 pain scale. Non-pharmacological interventions, such as repositioning and rest encouraged, and PRN pain medications available as needed. Heating pad in place for chronic lower back pain. Continue to monitor and assess.

## 2019-05-17 NOTE — PROGRESS NOTES
difficulty to correct)  Stand pivot via RW: CGA/min a bed > w/c to the left  Stand pivot no device: CGA w/c > bed to the right    Functional Mobility:  Min a ~2-3' w/c > bed via RW 2x. Pt with increased time to advance LLE. No LOB. Therex:  x10 reps, following HEP:  Supine: Horizontal shoulder adduction AROM, shoulder flexion SROM, elbow extension AROM, shoulder flexion AROM  Seated: scapular adduction AROM, scapular elevation AROM, elbow flex/ext AROM, horizontal shoulder abduction stretching    ADL:  Toileting: CGA balance with pt holding onto toilet safety frame, OT assisted with pants down. Pt assisted somewhat with pants up, yet progressed to min a balance. No pericare, only urinated. Grooming: Supervision seated in w/c at sink for hand hygiene      Assessment: Pt pleasant and hardworking, motivated to regain his independence. Pt says he was very active prior to CVA. Continue OT tx per POC.     Safety Device - Type of devices:  []  All fall risk precautions in place [] Bed alarm in place  [] Call light within reach [] Chair alarm in place [] Positioning belt [x] Gait belt [] Patient at risk for falls [] Left in bed [] Left in chair [] Telesitter in use [] Sitter present [] Nurse notified [x]  Left in w/c in care of transporter      Therapy Time   Individual Co-treatment   Time In 1345     Time Out 1430     Minutes 45       Electronically signed by LAYNE Paul/L #[id number] on 5/17/2019 at 5:37 PM

## 2019-05-17 NOTE — PROGRESS NOTES
Patient admitted for stroke. Pt. A&O x4. VSS on room air. Lungs CTA, sinus didier on tele, abdomen non-tender and non-distended with active bowel sounds. Last bowel movement noted on 5/15. Patient transfers with stand/pivot to wheelchair. Pt. calls out appropriately for toileting needs, uses the urinal. Medication administered, pt. tolerated well. Non-pharmacological and PRN pain medication available per [facility], has not needed this shift thus far. Heating pad in place for chronic lower back pain. Fall precautions in place, call light and belongings within reach. Pt. resting peacefully. Will continue to monitor.

## 2019-05-17 NOTE — PATIENT CARE CONFERENCE
increased ability to complete transfers with decreased assistance, improve higher level balance, and improve ambulation to promote ability to return home. SPEECH THERAPY   FIMS:  Comprehension: 6 - Complex ideas 90% or device (hearing aid/glasses)  Expression: 6 - Device used to express complex ideas/needs  Social Interaction: 6 - Patient requires medication for mood and/or effect  Problem Solvin - Patient able to solve simple/routine tasks  Memory: 5 - Patient requires prompting with stress/unfamiliar situations    Assessment:   Moderately impaired left sided labial, buccal, and lingual strength, ROM and tone which affects pt's intellgibility and articulatory precision at sentence level and oral phase of the swallow (ie, concern for premature spillage, minimal oral residue, increased labor and fatigue at meals). Concern for mild decreased laryngeal elevation and reduced airway protection, but no overt s/s of aspiration during CSE. Mild cognitive impairment with working memory, recall of new information, concern for reduced complex and high level PS, with further assess of money/medication management warranted. Pearl Rose MS, CCC-SLP #[id number]    OCCUPATIONAL THERAPY    ADL  Grooming: Setup(Washed face and hands seated on shower chair.)  UE Bathing: Minimal assistance(Assist to wash under right arm. Pt was able to reach with left but required assist as he could not place pressure.)  LE Bathing: Minimal assistance(Pt able to lean forward to reach his feet to wash. Assist to wash posterior area when pt in standing.)  UE Dressing: Stand by assistance  LE Dressing: Maximum assistance(Assist to [place] both shoes and socks. Pt instructed to cross left leg to [place] pants and underwear. Pt able to [place] underwear and pants over both feet. Assist to pull up left side of pants and underwear.)  Additional Comments: Pt was able to apply lotion to body parts with assist to apply lotion to his hand.     Bed left UE during ADL tasks. NUTRITION  Most recent weightWeight: 149 lb 11.1 oz (67.9 kg)  BSA (Calculated - sq m): 1.89 sq meters  BMI (Calculated): 20.3  Diet Order: DIET CARDIAC;  PO Meals Eaten (%): 76 - 100%        NURSING  Bladder 5, Bowel 6, monitor and maintain skin integrity, loop recorder site. Family Education: Patient education: CVA, communication needs, safety/fall prevention, skin care/prevention, AFO care, medications, swallowing precautions/safety, pain control, smoking cessation. MEDICAL  Increase BP regimen due to elevated diastolic BPs. TEAM SUMMARY AND DISCHARGE PLAN  Estimated Length of Stay:1.5-2 weeks  Destination: home health  · Anticipated Services at Discharge:    [x] OT  [x] PT   [x] SLP    [x] RN   [] Home Health aide []   Community Resources: _______________________________  Equipment recommendations:  [] Hospital bed [] Tub bench  [x] Shower chair [] Hand held shower  Cont to assess  [] Raised toilet seat [] Toilet safety frame [] Bedside commode   [] W/C: _____  [] Rolling Walker [] Standard walker [] Gait belt [] cane: _________  [] Sliding board [] Alternate seating/furniture [] O2 [] Hip Kit: _______  [] Life Line [] Other: _______ (continue to assess pending progression)   Factors facilitating achievement of predicted outcomes: Family support, Motivated, Cooperative, Pleasant and Home is wheelchair accessible   Barriers to the achievement of predicted outcomes/Interventions: impulsive, L sided weakness, and decreased activity tolerance--> interventions to include but not limited to promotion of L side body with all functional transfers/tasks, strengthening exercises, compensatory, compensatory memory strategies, and continuous education for safety awareness especially with movements of L side. Interdisciplinary Individualized Plan of Care Review:    · Continue Current Plan of Care:  Yes    · Modifications:______none_______________________    Special Needs in the Upcoming Week :    [] Family/Caregiver Education  [] Home visit  []Therapeutic Pass   [] Consults:_______    [] Other;_______    Patient Rehab Team Goals for the Upcoming Week:  1. Increase proper use of L side body with functional transfers to promote decreased assistance for safety. 2. Pt will perform ADL tasks with decreased assist across disciplines, & increased use of LUE. 3. Pt will demonstrate carryover with new learning safety strategies with set up and <mild reminders          Team Members Present at Conference:  Physician: Dr. Radhika Escamilla  : LILI Daley    Occupational Therapist: Ronda Moore, OTR/L # [id number]  Physical Therapist: Barbara Valentin PT, DPT [id number]   Speech Therapist: Fermin Beaver. La,MS,CCC,SLP [id number]  Nurse:ISAEL Jordan RN, CRRN  Dietician: Maritza Fraser RD, LD  Psychologist:  Other:      I led this team conference and I approve the established interdisciplinary plan of care as documented within the medical record of Lesle Sacks. MD: Riana Whittaker.  Radhika Escamilla MD 5/20/2019, 11:38 AM

## 2019-05-17 NOTE — PROGRESS NOTES
with both hands, used L hand to pull sock over heel. Donned L sock by crosing LLE onto R thigh with dycem, used L hand only to don socks over toes and pull over heel. Pt donned R shoe bending down with L hand. Assist to don L shoe and assist tying both shoes. Pt interested in learning one handed technique to tie shoes. Wheelchair Bed Transfers  Wheelchair/Bed - Technique: Stand step(RW)  Equipment Used: Bed;Wheelchair  Level of Asssistance: Minimal assistance  Wheelchair Transfers Comments: w/c > bed with RW with min A, but pt had much difficulty with advancing / moving his RLE. Pt scooted his feet to align hips with bed as opposed to taking full steps. Bed mobility  Sit to Supine: Modified independent           Coordination  Gross Motor: Practiced picking up and moving hollow pegs and bean bags with LUE from L to R. Hollow pegs too difficult for pt to grasp, but pt able to hold and move bean bags. Pt compensated for poor LUE shoulder flexion and horizontal adduction by supporing LUE with R arm. OT compiled HEP of AAROM/AROM ex from [facility] for pt to do in his room, planned to address this session but ran out of time D/T pt not feeling well & returned pt to room. Plan   Plan  Times per week: 5-6  Times per day: Twice a day  Current Treatment Recommendations: Strengthening, Functional Mobility Training, Patient/Caregiver Education & Training, Home Management Training, ROM, Endurance Training, Equipment Evaluation, Education, & procurement, Self-Care / ADL, Safety Education & Training, Neuromuscular Re-education, Balance Training  Plan Comment: Monday Conference    Goals  Short term goals  Time Frame for Short term goals: 1 week   Short term goal 1: Pt will perform bathing with min A and shower chair. Short term goal 2: Pt will perform upper body dressing SBA and lower body dressing min A except Eric Hose.    Short term goal 3: Pt will perform toileting with CGA and grab bars PRN. Short term goal 4: Pt will perform functional mobility / transfers with CGA and LRAD. Short term goal 5: Pt will improve LUE AROM-HEP / strength / coordination through there ex / neuro re-ed to actively use for bilateral ADL tasks 25% of time. Long term goals  Time Frame for Long term goals : 2-3 weeks   Long term goal 1: Pt will perform bathing with mod ind and shower chair   Long term goal 2: Pt will perform dressing with mod ind  Long term goal 3: Pt will perform toileting with mod ind  Long term goal 4: Pt will perform functional mobility / transfers with mod ind and LRAD   Long term goal 5: Pt will improve LUE AROM-HEP / strength / coordination through there ex / neuro re-ed to actively use for bilateral ADL tasks 50% of time. Long term goals 6: Pt will improve balance, L side awareness and activity tolerance to perform object retrieval / transport for simple IADL tasks with supervision. Patient Goals   Patient goals : I want to \"get my limbs back\" to be able to work again as , drive again, and be intimate with my girl friend. Therapy Time   Individual Concurrent Group Co-treatment   Time In 5177         Time Out 1200         Minutes 45         Timed Code Treatment Minutes: [address] OT/S    Therapist was present, directed patients care, made skilled judgement, and was responsible for assessment and treatment of the patient.       Miranda Baxter OTR/L #[id number]

## 2019-05-17 NOTE — PROGRESS NOTES
Speech Language Pathology  ACUTE REHAB UNIT  SPEECH/LANGUAGE PATHOLOGY      [x] Daily  [x] Weekly Care Conference Note  [] Discharge    Patient:Nick Stovall  VFT:[id number]  Rehab Dx/Hx: Right-sided cerebrovascular accident (CVA) ([facility] [address] [I63.9]    Precautions: falls  Home situation: lives in apartment with girlfriend, independent   ST Dx: [] Aphasia  [x] Dysarthria  [] Apraxia   [x] Oropharyngeal dysphagia [x] Cognitive Impairment  [] Other:   Date of Admit: 5/15/2019  Room #: R1E-4542/3259-01  Date: 5/17/2019       Current functional status (updated daily):         Pt being seen for : [] Speech/Language Treatment  [x] Dysphagia Treatment [x] Cognitive Treatment  [x] Other: dysarthria  Current Diet Order:DIET CARDIAC;    Communication: []WFL  [] Aphasia  [x] Dysarthria  [] Apraxia  [] Pragmatic Impairment [] Non-verbal  [] Hearing Loss  [] Other:   Cognition: [] WFL  [x] Mild  [] Moderate  [] Severe [] Unable to Assess  [] Other:  Memory: [] WFL  [x] Mild  [] Moderate  [] Severe [] Unable to Assess  [] Other:  Behavior: [x] Alert  [x] Cooperative  []  Pleasant  [] Confused  [] Agitated  [] Uncooperative  [] Distractible [x] Motivated  [] Self-Limiting [] Anxious  [] Other:  Endurance:  [x] Adequate for participation in SLP sessions  [] Reduced overall  [] Lethargic  [] Other:  Safety: [x] No concerns at this time  [] Reduced insight into deficits  []  Reduced safety awareness [] Not following call light procedures  [] Unable to Assess  [] Other:  Swallowing Precautions: Sit up for all meals and thereafter for 30 minutes, Eat with small bites (1/2 tsp; 1 tsp), Alternate solids with liquids and Check mouth for food residue after snacks and meals  Barriers toward progress: Upper extremity weakness and Lower extremity weakness  Discharge recommendations:  [] Home independently  [x] Home with assistance []  24 hour supervision  [] ECF [] Other:  Continued Tx Upon Discharge: ? [x] Yes [] No alarm activated  [] Bed alarm activated  [] Other:    Assessment: 5/17: Moderately impaired left sided labial, buccal, and lingual strength, ROM and tone which affects pt's intellgibility and articulatory precision at sentence level and oral phase of the swallow (ie, concern for premature spillage, minimal oral residue, increased labor and fatigue at meals). Concern for mild decreased laryngeal elevation and reduced airway protection, but no overt s/s of aspiration during CSE. Mild cognitive impairment with working memory, recall of new information, concern for reduced complex and high level PS, with further assess of money/medication management warranted. Plan: Continue as per plan of care.       Additional information:     Interventions used during rehab stay:  [] Speech/Language Treatment  [] Instruction in HEP [] Group [x] Dysphagia Treatment [x] Cognitive Treatment   [x] Other: dysarthria/articulation    Adverse Reactions to Treatment/Significant Change in Status: no    Electronically Signed by    Cuong Beltran MS, CCC-SLP #[id number]

## 2019-05-18 PROCEDURE — 6370000000 HC RX 637 (ALT 250 FOR IP): Performed by: PHYSICAL MEDICINE & REHABILITATION

## 2019-05-18 PROCEDURE — 97535 SELF CARE MNGMENT TRAINING: CPT

## 2019-05-18 PROCEDURE — 1280000000 HC REHAB R&B

## 2019-05-18 PROCEDURE — 97110 THERAPEUTIC EXERCISES: CPT

## 2019-05-18 PROCEDURE — 97116 GAIT TRAINING THERAPY: CPT

## 2019-05-18 PROCEDURE — 97530 THERAPEUTIC ACTIVITIES: CPT

## 2019-05-18 RX ADMIN — SENNOSIDES,DOCUSATE SODIUM 2 TABLET: 8.6; 5 TABLET, FILM COATED ORAL at 22:00

## 2019-05-18 RX ADMIN — AMLODIPINE BESYLATE 10 MG: 10 TABLET ORAL at 07:48

## 2019-05-18 RX ADMIN — ATORVASTATIN CALCIUM 80 MG: 80 TABLET, FILM COATED ORAL at 22:00

## 2019-05-18 RX ADMIN — SENNOSIDES,DOCUSATE SODIUM 2 TABLET: 8.6; 5 TABLET, FILM COATED ORAL at 08:40

## 2019-05-18 RX ADMIN — ASPIRIN 81 MG 81 MG: 81 TABLET ORAL at 08:42

## 2019-05-18 RX ADMIN — TRAMADOL HYDROCHLORIDE 50 MG: 50 TABLET, FILM COATED ORAL at 22:00

## 2019-05-18 RX ADMIN — CLOPIDOGREL BISULFATE 75 MG: 75 TABLET, FILM COATED ORAL at 08:40

## 2019-05-18 ASSESSMENT — PAIN SCALES - GENERAL
PAINLEVEL_OUTOF10: 6
PAINLEVEL_OUTOF10: 0
PAINLEVEL_OUTOF10: 4
PAINLEVEL_OUTOF10: 1

## 2019-05-18 ASSESSMENT — PAIN DESCRIPTION - PROGRESSION: CLINICAL_PROGRESSION: NOT CHANGED

## 2019-05-18 ASSESSMENT — PAIN DESCRIPTION - LOCATION
LOCATION: BACK
LOCATION: BACK

## 2019-05-18 ASSESSMENT — PAIN DESCRIPTION - FREQUENCY: FREQUENCY: CONTINUOUS

## 2019-05-18 ASSESSMENT — PAIN DESCRIPTION - DESCRIPTORS: DESCRIPTORS: ACHING

## 2019-05-18 ASSESSMENT — PAIN DESCRIPTION - PAIN TYPE
TYPE: CHRONIC PAIN
TYPE: CHRONIC PAIN

## 2019-05-18 ASSESSMENT — PAIN - FUNCTIONAL ASSESSMENT: PAIN_FUNCTIONAL_ASSESSMENT: ACTIVITIES ARE NOT PREVENTED

## 2019-05-18 ASSESSMENT — PAIN DESCRIPTION - ONSET: ONSET: ON-GOING

## 2019-05-18 ASSESSMENT — PAIN DESCRIPTION - ORIENTATION: ORIENTATION: RIGHT

## 2019-05-19 PROCEDURE — 6370000000 HC RX 637 (ALT 250 FOR IP): Performed by: PHYSICAL MEDICINE & REHABILITATION

## 2019-05-19 PROCEDURE — 1280000000 HC REHAB R&B

## 2019-05-19 PROCEDURE — 94760 N-INVAS EAR/PLS OXIMETRY 1: CPT

## 2019-05-19 RX ADMIN — AMLODIPINE BESYLATE 10 MG: 10 TABLET ORAL at 10:06

## 2019-05-19 RX ADMIN — ATORVASTATIN CALCIUM 80 MG: 80 TABLET, FILM COATED ORAL at 20:38

## 2019-05-19 RX ADMIN — ASPIRIN 81 MG 81 MG: 81 TABLET ORAL at 10:06

## 2019-05-19 RX ADMIN — TRAMADOL HYDROCHLORIDE 100 MG: 50 TABLET, FILM COATED ORAL at 10:06

## 2019-05-19 RX ADMIN — TRAMADOL HYDROCHLORIDE 100 MG: 50 TABLET, FILM COATED ORAL at 20:38

## 2019-05-19 RX ADMIN — CLOPIDOGREL BISULFATE 75 MG: 75 TABLET, FILM COATED ORAL at 10:06

## 2019-05-19 ASSESSMENT — PAIN DESCRIPTION - LOCATION
LOCATION: BACK

## 2019-05-19 ASSESSMENT — PAIN DESCRIPTION - DESCRIPTORS
DESCRIPTORS: DISCOMFORT
DESCRIPTORS: ACHING

## 2019-05-19 ASSESSMENT — PAIN DESCRIPTION - ONSET
ONSET: ON-GOING

## 2019-05-19 ASSESSMENT — PAIN DESCRIPTION - PAIN TYPE
TYPE: CHRONIC PAIN

## 2019-05-19 ASSESSMENT — PAIN - FUNCTIONAL ASSESSMENT
PAIN_FUNCTIONAL_ASSESSMENT: ACTIVITIES ARE NOT PREVENTED

## 2019-05-19 ASSESSMENT — PAIN DESCRIPTION - FREQUENCY
FREQUENCY: CONTINUOUS

## 2019-05-19 ASSESSMENT — PAIN SCALES - GENERAL
PAINLEVEL_OUTOF10: 4
PAINLEVEL_OUTOF10: 5
PAINLEVEL_OUTOF10: 7
PAINLEVEL_OUTOF10: 0
PAINLEVEL_OUTOF10: 7

## 2019-05-19 ASSESSMENT — PAIN DESCRIPTION - ORIENTATION
ORIENTATION: RIGHT

## 2019-05-19 ASSESSMENT — PAIN DESCRIPTION - PROGRESSION
CLINICAL_PROGRESSION: NOT CHANGED
CLINICAL_PROGRESSION: NOT CHANGED
CLINICAL_PROGRESSION: GRADUALLY IMPROVING

## 2019-05-19 NOTE — PLAN OF CARE
Problem: Risk for Impaired Skin Integrity  Goal: Tissue integrity - skin and mucous membranes  Description  Structural intactness and normal physiological function of skin and  mucous membranes. Outcome: Ongoing  Note:   Skin assessment performed each shift per protocol. Pt repositioned in bed every two hours with pillow support. Will continue to monitor and assess for skin breakdown. Problem: Falls - Risk of:  Goal: Will remain free from falls  Description  Will remain free from falls  Outcome: Ongoing  Note:   Pt free from falls this shift. Fall precautions in place at all times. Call light always within reach. Pt able and agreeable to contact for safety appropriately.        Problem: Neurological  Goal: Maximum potential motor/sensory/cognitive function  Outcome: Ongoing     Problem: Tobacco Use:  Goal: Inpatient tobacco use cessation counseling participation  Description  Inpatient tobacco use cessation counseling participation  Outcome: Ongoing

## 2019-05-19 NOTE — PROGRESS NOTES
Pt A&O x4. VSS. Pt reported chronic right side pain-pt medicated with prn pail pill. Pt denies any other complains at present. Pt respirations clear and unlabored on room air. BS present in all quadrants. Scheduled a.m. meds given to the pt. Pt sitting up in the bed and eating breakfast. Uses urinal at bedside. Pt denies other needs at time. Call light and item need in reach. Bed alarm on.  Electronically signed by Gabo Jones RN on 5/19/2019 at 10:44 AM

## 2019-05-20 PROCEDURE — 6370000000 HC RX 637 (ALT 250 FOR IP): Performed by: PHYSICAL MEDICINE & REHABILITATION

## 2019-05-20 PROCEDURE — 97127 HC SP THER IVNTJ W/FOCUS COG FUNCJ: CPT

## 2019-05-20 PROCEDURE — 97535 SELF CARE MNGMENT TRAINING: CPT

## 2019-05-20 PROCEDURE — 92507 TX SP LANG VOICE COMM INDIV: CPT

## 2019-05-20 PROCEDURE — 97110 THERAPEUTIC EXERCISES: CPT

## 2019-05-20 PROCEDURE — 97530 THERAPEUTIC ACTIVITIES: CPT

## 2019-05-20 PROCEDURE — 1280000000 HC REHAB R&B

## 2019-05-20 PROCEDURE — 92526 ORAL FUNCTION THERAPY: CPT

## 2019-05-20 PROCEDURE — 97116 GAIT TRAINING THERAPY: CPT

## 2019-05-20 PROCEDURE — 94760 N-INVAS EAR/PLS OXIMETRY 1: CPT

## 2019-05-20 RX ORDER — LANOLIN ALCOHOL/MO/W.PET/CERES
3 CREAM (GRAM) TOPICAL NIGHTLY PRN
Status: DISCONTINUED | OUTPATIENT
Start: 2019-05-20 | End: 2019-05-22

## 2019-05-20 RX ORDER — M-VIT,TX,IRON,MINS/CALC/FOLIC 27MG-0.4MG
1 TABLET ORAL DAILY
Status: DISCONTINUED | OUTPATIENT
Start: 2019-05-20 | End: 2019-06-01 | Stop reason: HOSPADM

## 2019-05-20 RX ADMIN — ASPIRIN 81 MG 81 MG: 81 TABLET ORAL at 07:26

## 2019-05-20 RX ADMIN — ATORVASTATIN CALCIUM 80 MG: 80 TABLET, FILM COATED ORAL at 21:06

## 2019-05-20 RX ADMIN — METOPROLOL TARTRATE 25 MG: 25 TABLET ORAL at 21:06

## 2019-05-20 RX ADMIN — MULTIPLE VITAMINS W/ MINERALS TAB 1 TABLET: TAB at 11:24

## 2019-05-20 RX ADMIN — AMLODIPINE BESYLATE 10 MG: 10 TABLET ORAL at 07:27

## 2019-05-20 RX ADMIN — Medication 3 MG: at 21:06

## 2019-05-20 RX ADMIN — SENNOSIDES,DOCUSATE SODIUM 2 TABLET: 8.6; 5 TABLET, FILM COATED ORAL at 21:05

## 2019-05-20 RX ADMIN — METOPROLOL TARTRATE 25 MG: 25 TABLET ORAL at 11:10

## 2019-05-20 RX ADMIN — CLOPIDOGREL BISULFATE 75 MG: 75 TABLET, FILM COATED ORAL at 07:27

## 2019-05-20 RX ADMIN — SENNOSIDES,DOCUSATE SODIUM 2 TABLET: 8.6; 5 TABLET, FILM COATED ORAL at 07:27

## 2019-05-20 ASSESSMENT — PAIN DESCRIPTION - DESCRIPTORS: DESCRIPTORS: ACHING

## 2019-05-20 ASSESSMENT — PAIN DESCRIPTION - ORIENTATION: ORIENTATION: RIGHT

## 2019-05-20 ASSESSMENT — PAIN DESCRIPTION - LOCATION: LOCATION: BACK

## 2019-05-20 ASSESSMENT — PAIN SCALES - GENERAL
PAINLEVEL_OUTOF10: 0
PAINLEVEL_OUTOF10: 3
PAINLEVEL_OUTOF10: 0

## 2019-05-20 ASSESSMENT — PAIN DESCRIPTION - PAIN TYPE: TYPE: CHRONIC PAIN

## 2019-05-20 NOTE — PROGRESS NOTES
level 4 resistance, average SPM 52. Comment: Assisted pt with donning shoes and AFO brace this date, max assist for transfers              PM session:   S: Pt reports able to nap earlier this date and feeling rested/eager to participate with therapy. Pt reports overall decreased pain. Pt ashley Madera present throughout PM session. O: Sit <-> stand CGA-min assist with cues to including use of L side for transfers. Pt ambulated with ' two turns increased time CGA-min assist. With turns pt tries to pivot on RLE only requires cues to including LLE with mobility tasks. Pt increased tolerance and increased step length this date but delayed swing initiation of LLE with decreaed clearance despite AFO. Sit <-> stand X 5 trials without use of UE, BLE squeezing ball to promote increased use of LLE with min-mod assist to complete. Sit <-> stand X 3 trial RLE on 6\" step to promote increased WB LLE on floor, with use of RUE stood with moderate assistance working on erect posture, difficulty with stance on LLE. Standing at [address] [place] [address] attempted alternating two tap on 6\" step but unable to complete after 2 on each LE due to fatigue. Ace wrap donned to LLE for standing activities. Performed LLE toe tapping on 6\" X 5 with mod-max assist to promote knee flexion but able to perform hip flexion. Performed RLE toe tapping on 6\" step x10 with moderate blocking of L knee. Standing TKE L knee X 5 with tactile cues and CGA-min assist standing at . Pt return to room upon completion of session. Pt w/c to bed with RW CGA. Sitting EOB assisted patient with doffing bilateral shoes. Sitting EOB to supine mod I with increased time. Assisted patient with placing all needs within reach. A: Exercises/functional tasks promote increased fatigue this afternoon. tolerated activities to promote increased use of L side of body with significant increased fatigue this date.  Pt progressing in techniques to include left side for transfers, constant education to avoid always compensating with right side of body. Safety Device - Type of devices:  [x]  All fall risk precautions in place [x] Bed alarm in place  [x] Call light within reach [] Chair alarm in place [] Positioning belt [x] Gait belt [x] Patient at risk for falls [x] Left in bed [] Left in chair [] Telesitter in use [] Sitter present [] Nurse notified []  None     Assessment   Body structures, Functions, Activity limitations: Decreased functional mobility ; Decreased endurance;Decreased ROM; Decreased strength;Decreased safe awareness;Decreased balance;Decreased coordination;Decreased fine motor control;Decreased sensation  Assessment: Pt limited this morning due to increased fatigue, increased time to complete all mobility tasks secondary to getting tired too quickly. Pt continues to require CGA-min assist for transfers, min assist for ambulation with RW using off the shelf AFO. Pt very high functioning prior to admission with physical work, significant below base line at this time. Pt benefit from home PT to promote increased activation of L side body with functional mobility tasks, increased ability to complete transfers with decreased assistance, improve higher level balance, and improve ambulation to promote ability to return home. Treatment Diagnosis: Difficulty with ambulation/decrease strength secondary to L sided weakness   Prognosis: Good  Patient Education: use of AFO for assistance with mobility tasks, promoting use of left side with all transfers and walking with RW , safe transfers  REQUIRES PT FOLLOW UP: Yes  Activity Tolerance  Activity Tolerance: Patient Tolerated treatment well; Other  Activity Tolerance: limited due to L sided weakness        Goals  Short term goals  Time Frame for Short term goals: 1 week  Short term goal 1: sit <-> stand transfers CGA  Short term goal 2: ambulate 150 ft with LRAD CGA   Short term goal 3: ascend/descend 4 steps unilateral railing with

## 2019-05-20 NOTE — PROGRESS NOTES
(targeteing various head postures: head rotation left; chin tuck posture) : · Thin liquid: isolated sips tolerated without overt clinical s/s of penetration or aspiration. Pt self reports chin tuck is the best.  · Mixed consistencies: 15% overt overt clinical s/s of penetration or aspiration (coughing episode following swallow)  · Nectar thick Liquid: isolated sips tolerated without overt clinical s/s of penetration or aspiration. Pt self reports chin tuck is the best.  · Honey thick liquid:  isolated sips tolerated without overt clinical s/s of penetration or aspiration. Pt self reports chin tuck is the best.  · Puree: Pt self reports chin tuck is the most optimal with less severe sensation something sticking  · Mech Soft: left oral pocketing; no anterior loss. Pt was able to clear oral pocketing. Inconsistent complaints of sensation something sticking. Again self reports chin tuck was the best strategies  · Dental soft: as above with mechanical soft  · REgular: as above with mechanical soft    Will request MBSS to further assess pharyngeal phase of the swallow and risk of penetration/aspiration  n/a   The patient will improve oral motor function via therapeutic oral motor exercises to 5/5 each trial. Left facial droop  -volitional lip rounding: minimal left rom  -volitional lip protrusion: no visible left rom  -lip retraction with scrunching nose: mild left rom    Left tactile/sensory stimulation well tolerated.  Pt ed in use     Muscle engagement left against resistance:  -alternating labial/buccal engagement versus relaxation: achieving mild muscular engagement against resistance (tongue depressor utilized)    -lingual protrusion : slight asym  -pt able to achieve moderate bilateral lateralization ext and int of mouth  -lingual elevation (anterior/posterior): slight asym on elevation    During phonation of /a/: slight asym    Oral reflexes: gag: absent; palatal: absent n/a   The patient will improve oral Safety Devices: [] Call light within reach  [] Chair alarm activated  [] Bed alarm activated  [x] Other: transport returned pt to room [] Call light within reach  [] Chair alarm activated  [] Bed alarm activated  [] Other:    Assessment: 5/17: Moderately impaired left sided labial, buccal, and lingual strength, ROM and tone which affects pt's intellgibility and articulatory precision at sentence level and oral phase of the swallow (ie, concern for premature spillage, minimal oral residue, increased labor and fatigue at meals). Concern for mild decreased laryngeal elevation and reduced airway protection, but no overt s/s of aspiration during CSE. Mild cognitive impairment with working memory, recall of new information, concern for reduced complex and high level PS, with further assess of money/medication management warranted. 5/20/2019: Pt and staff reporting increase dysphagia symptoms. Pt/staff complaints of increase in left facial droop. Re-assessment of oral motor speech; oral motor rom/oral sensory for gag and palatal response; skilled therapy trials. Pt this date self reports chin tuck helps but persistent complaints of sensation something sticking left side of throat (pointing to mid neck). Will request MBSS    Plan: Continue as per plan of care. Additional information:     Interventions used during rehab stay:  [] Speech/Language Treatment  [] Instruction in HEP [] Group [x] Dysphagia Treatment [x] Cognitive Treatment   [x] Other: O-M Speech for dysarthria/articulation    Adverse Reactions to Treatment/Significant Change in Status: no    Electronically Signed by    Betsy Wagoner. Flum,MS,CCC,SLP [id number]  Speech and Language Pathologist

## 2019-05-20 NOTE — CARE COORDINATION
Team Confrence held today. Team reviewed progress and goals. Team reports he is working in all three therapies. Team to update insurance today and requesting an additional week to continue his progress. LSW met with patient to review. He is most agreeable to continued stay on rehab. He requests LSW call his daughter, Granville Landau. Call placed to [facility], [phone] to review. She states she has applied for Disability for him. She is very appreciative of his care while on rehab. She agrees with further therapy needs prior to dc. Next Team Conference will be Monday, 5-.   Parrish Mcgill, [employer]     Case Management   [phone]    5/20/2019  12:49 PM

## 2019-05-20 NOTE — PROGRESS NOTES
Transfers  Wheelchair/Bed - Technique: Ambulating  Equipment Used: Other(recliner)  Level of Asssistance: Contact guard assistance         PM Session: Pt seen in department. Placed elastic shoelaces in his shoe. Pt donned right shoe with SBA. Pt able to place his toes in the right shoe with his leg crossed but required assist with pushing his foot in the shoe with the AFO. His heel was caught on the opening of the AFO. Ambulated in the gym with CGA. Required cues for step length as he was stepping past the front of the walker. Discussed discharge plans. Pt plans to discharge to his sammi's apartment. It is an accessible apartment. He will have grab bars next to the toilet and also in the shower. There is a handicap height toilet in the bathroom and a built in shower chair in the shower. Plan   Plan  Times per week: 5-6  Times per day: Twice a day  Current Treatment Recommendations: Strengthening, Functional Mobility Training, Patient/Caregiver Education & Training, Home Management Training, ROM, Endurance Training, Equipment Evaluation, Education, & procurement, Self-Care / ADL, Safety Education & Training, Neuromuscular Re-education, Balance Training  Plan Comment: Monday Conference    Goals  Short term goals  Time Frame for Short term goals: 1 week   Short term goal 1: Pt will perform bathing with min A and shower chair. Short term goal 2: Pt will perform upper body dressing SBA and lower body dressing min A except Eric Hose. Short term goal 3: Pt will perform toileting with CGA and grab bars PRN. Short term goal 4: Pt will perform functional mobility / transfers with CGA and LRAD. Short term goal 5: Pt will improve LUE AROM-HEP / strength / coordination through there ex / neuro re-ed to actively use for bilateral ADL tasks 25% of time.   Long term goals  Time Frame for Long term goals : 2-3 weeks   Long term goal 1: Pt will perform bathing with mod ind and shower chair   Long term goal 2:

## 2019-05-21 ENCOUNTER — APPOINTMENT (OUTPATIENT)
Dept: GENERAL RADIOLOGY | Age: 64
DRG: 045 | End: 2019-05-21
Attending: PHYSICAL MEDICINE & REHABILITATION
Payer: COMMERCIAL

## 2019-05-21 LAB
ANION GAP SERPL CALCULATED.3IONS-SCNC: 10 MMOL/L (ref 3–16)
BASOPHILS ABSOLUTE: 0 K/UL (ref 0–0.2)
BASOPHILS RELATIVE PERCENT: 0.9 %
BUN BLDV-MCNC: 13 MG/DL (ref 7–20)
CALCIUM SERPL-MCNC: 9 MG/DL (ref 8.3–10.6)
CHLORIDE BLD-SCNC: 103 MMOL/L (ref 99–110)
CO2: 25 MMOL/L (ref 21–32)
CREAT SERPL-MCNC: 0.7 MG/DL (ref 0.8–1.3)
EOSINOPHILS ABSOLUTE: 0.1 K/UL (ref 0–0.6)
EOSINOPHILS RELATIVE PERCENT: 3.7 %
GFR AFRICAN AMERICAN: >60
GFR NON-AFRICAN AMERICAN: >60
GLUCOSE BLD-MCNC: 104 MG/DL (ref 70–99)
HCT VFR BLD CALC: 43.9 % (ref 40.5–52.5)
HEMOGLOBIN: 14.8 G/DL (ref 13.5–17.5)
LYMPHOCYTES ABSOLUTE: 1.4 K/UL (ref 1–5.1)
LYMPHOCYTES RELATIVE PERCENT: 34.7 %
MCH RBC QN AUTO: 29.5 PG (ref 26–34)
MCHC RBC AUTO-ENTMCNC: 33.8 G/DL (ref 31–36)
MCV RBC AUTO: 87.3 FL (ref 80–100)
MONOCYTES ABSOLUTE: 0.4 K/UL (ref 0–1.3)
MONOCYTES RELATIVE PERCENT: 10.6 %
NEUTROPHILS ABSOLUTE: 2.1 K/UL (ref 1.7–7.7)
NEUTROPHILS RELATIVE PERCENT: 50.1 %
PDW BLD-RTO: 13.9 % (ref 12.4–15.4)
PLATELET # BLD: 268 K/UL (ref 135–450)
PMV BLD AUTO: 7.3 FL (ref 5–10.5)
POTASSIUM REFLEX MAGNESIUM: 4.2 MMOL/L (ref 3.5–5.1)
RBC # BLD: 5.03 M/UL (ref 4.2–5.9)
SODIUM BLD-SCNC: 138 MMOL/L (ref 136–145)
WBC # BLD: 4.1 K/UL (ref 4–11)

## 2019-05-21 PROCEDURE — 97112 NEUROMUSCULAR REEDUCATION: CPT

## 2019-05-21 PROCEDURE — 6370000000 HC RX 637 (ALT 250 FOR IP): Performed by: PHYSICAL MEDICINE & REHABILITATION

## 2019-05-21 PROCEDURE — 36415 COLL VENOUS BLD VENIPUNCTURE: CPT

## 2019-05-21 PROCEDURE — 97110 THERAPEUTIC EXERCISES: CPT

## 2019-05-21 PROCEDURE — 74230 X-RAY XM SWLNG FUNCJ C+: CPT

## 2019-05-21 PROCEDURE — 97530 THERAPEUTIC ACTIVITIES: CPT

## 2019-05-21 PROCEDURE — 92526 ORAL FUNCTION THERAPY: CPT

## 2019-05-21 PROCEDURE — 80048 BASIC METABOLIC PNL TOTAL CA: CPT

## 2019-05-21 PROCEDURE — 1280000000 HC REHAB R&B

## 2019-05-21 PROCEDURE — 97116 GAIT TRAINING THERAPY: CPT

## 2019-05-21 PROCEDURE — 92611 MOTION FLUOROSCOPY/SWALLOW: CPT

## 2019-05-21 PROCEDURE — 94760 N-INVAS EAR/PLS OXIMETRY 1: CPT

## 2019-05-21 PROCEDURE — 85025 COMPLETE CBC W/AUTO DIFF WBC: CPT

## 2019-05-21 RX ADMIN — CLOPIDOGREL BISULFATE 75 MG: 75 TABLET, FILM COATED ORAL at 08:01

## 2019-05-21 RX ADMIN — METOPROLOL TARTRATE 25 MG: 25 TABLET ORAL at 08:02

## 2019-05-21 RX ADMIN — ASPIRIN 81 MG 81 MG: 81 TABLET ORAL at 08:02

## 2019-05-21 RX ADMIN — METOPROLOL TARTRATE 25 MG: 25 TABLET ORAL at 20:06

## 2019-05-21 RX ADMIN — MULTIPLE VITAMINS W/ MINERALS TAB 1 TABLET: TAB at 08:02

## 2019-05-21 RX ADMIN — SENNOSIDES,DOCUSATE SODIUM 2 TABLET: 8.6; 5 TABLET, FILM COATED ORAL at 08:01

## 2019-05-21 RX ADMIN — Medication 3 MG: at 20:06

## 2019-05-21 RX ADMIN — ATORVASTATIN CALCIUM 80 MG: 80 TABLET, FILM COATED ORAL at 20:06

## 2019-05-21 RX ADMIN — AMLODIPINE BESYLATE 10 MG: 10 TABLET ORAL at 08:01

## 2019-05-21 ASSESSMENT — PAIN SCALES - GENERAL
PAINLEVEL_OUTOF10: 0

## 2019-05-21 NOTE — PROGRESS NOTES
Physical Therapy  Facility/Department: [address] [facility]  Daily Treatment Note  NAME: Zoila Jack  : 1955  MRN: [id number]    Date of Service: 2019    Discharge Recommendations:  Home with assist PRN, Home with Home health PT, S Level 1   PT Equipment Recommendations  Equipment Needed: Yes  Other: will continue to assess--> RW vs cane pending progression    Patient Diagnosis(es): There were no encounter diagnoses. has a past medical history of Cerebral artery occlusion with cerebral infarction ([facility] [address] has a past surgical history that includes Wrist fracture surgery (Left, ). Social/Functional History  Lives With: Significant other(Wife--> Amanda \"JESUS MANUEL\")  Type of Home: Apartment(Also has a house, but currently under construction. )  Home Layout: One level(House is one level)  Home Access: Level entry(House has three steps to enter without railing )  Bathroom Shower/Tub: Walk-in shower(No lip in walk in shower at apartment per pt report. Tub / shower at house with sliding glass door)  Bathroom Toilet: Handicap height(Handicap height at apartment. Plans to have comfort height toilet at house.)  Bathroom Equipment: Grab bars in shower, Grab bars around toilet(Grab bars on all three walls of shower.   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 Patient currently remodeling his house which is 3 KOSTA enter house, 1 level, 1 tub/shower      Restrictions  Restrictions/Precautions  Restrictions/Precautions: Fall Risk  Position Activity Restriction  Other position/activity restrictions: cardiac diet   Subjective   General  Chart Reviewed: Yes  Additional Pertinent Hx: \"66year-old male with sudden onset of left arm, leg, and facial weakness and numbness on 5/12 despite taking Eliquis. 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 He does have a history of sickle cell trait. Patient seen by neurology, and switch his medications to aspirin and Plavix. Patient also seen by hematology, and blood work for hypercoagulable state is in progress. MRI results noted acute right basal ganglia infarct. Echo negative for thrombus or shunting. Patient started therapies, but needs more therapy before discharged to home safely due to his left-sided weakness. . \" (Dr. Devin Muñiz 5/15/19) Pt reports initial stroke 5/7/19 this date returned to home but states slowly decreased in functional mobility. Response To Previous Treatment: Patient with no complaints from previous session. Family / Caregiver Present: Yes(fiance )  Referring Practitioner: Dr. Jimenez/Dr. Delores Matson: Pt reports difficulty sleeping last night but this was common at baseline, pt reports feeling stronger/better this morning. Pt agreeable to PT treatment session. Denies pain overall.    Pain Screening  Patient Currently in Pain: Denies  Pain Assessment  Pain Assessment: 0-10  Pain Level: 0  Patient's Stated Pain Goal: No pain  Vital Signs  Patient Currently in Pain: Denies       Orientation  Orientation  Overall Orientation Status: Within Normal Limits  Orientation Level: Oriented X4    Objective      Transfers  Sit to Stand: Contact guard assistance(VC for L foot placement/hand placement with cues for proper sequencing )  Stand to sit: Contact guard assistance;Minimal Assistance(VC for hand placement and slow down for safety, slight assist for eccentric descent )  Comment: Sit <-> stand 2 X 5 trials without UE use squeezing BLEs ball with min-mod asssist promoting increased use of LLE with transfer. Ambulation  Ambulation?: Yes  Ambulation 1  Surface: level tile  Device: Rolling Walker  Other Apparatus: AFO;Dorsiflex Assist;Left(with ace wrap donned )  Assistance: Contact guard assistance;Minimal assistance  Quality of Gait: able to advance LLE with AFO, tends to lean right and hike left hip to advance leg, cues to take a step vs swing the leg, occassional left knee buckeling, small MORTEZA but able to widen with cues.  Pt with decreased stance LLE moving RLE quickly stomping to ground   Distance: 180' with two turns, small distances in therapy gym   Comments: decreased compensation this date with overall increased use of LLE   Stairs/Curb  Stairs?: Yes  Stairs  # Steps : 4  Stairs Height: 6\"  Rails: Bilateral(use of bilateral railing with increased cues for LUE )  Curbs: 6\"(X 1 trial with RW CGA-min assist, with assistance for management LLE including blocking of knee in SLS and proper seqeuncing)  Device: No Device  Other Apparatus: AFO(L ankle with ace wrap )  Assistance: Minimal assistance  Comment: pt ascend/descend nonreciprocal pattern with increased time, blocking of L knee and requires slight decreased assistance for placement/lifting LLE throughout, cues/assistance with LUE on railing; slightly impuslive requires increased assistance to complete; VC for proper seqeuncing for safety      Balance  Posture: Good  Sitting - Static: Good  Sitting - Dynamic: Good  Standing - Static: Good  Standing - Dynamic: Fair;+  Comments: RW for standing with VC for proper positioning   Other exercises  Other exercises 2: Standing at RW CGA

## 2019-05-21 NOTE — PROGRESS NOTES
Pt resting quietly in bed. Admitted with a CVA, left sided weakness. Slight left sided facial droop. Transfers x1 with a walker, AFO needed for LLE. Lungs CTA, HR regular. Telemetry in place,rhythm verified with CMU, NSR. Abdomen non tender with active bowel sounds. LBM 5/19. Reports good appetite. Continent of bowel and bladder, uses the urinal overnight. Denies pain. Pt reported poor sleep over the last few nights, medicated with melatonin at HS. All needs assessed with rounding, call light in reach at all times. Visitor at the bedside spending the night. Will continue to monitor.  Yvette August RN

## 2019-05-21 NOTE — PROCEDURES
optimal with compensatory swallow posture/techniques for thin liquids and food consistencies    Symptoms:  · Episodes of premature loss across consistencies  · Reduced bolus formation with left oral pocketing and inconsistent anterior loss  · Lingual mashing pattern A-P oral transit with gradual pooling of material  · Pooling of items to the valleculae prior to swallow  · Inconsistent pooling of thin liquids to the pyriform sinus with deep penetration to level of VC before/during swallow  · Post swallow aspiration of thin liquids with reduced sensation, with trace to minimal liquid residue in the pyriform sinus  · Post swallow oral residue (all items but increases as viscosity of items increase)  · Oral residue pools to the pharynx at level of valleculae and pyriform sinus with inconsistent sensation. One episode of penetration of residue post swallow. 3. Analysis of compensatory strategies  · Lingual hold and chin tuck was effective in eliminating aspiration and deep penetration  · Clearance swallow clears oral and pharyngeal residue     Dysphagia Score: Dysphagia Outcome Severity Scale: Level 5: Mild dysphagia- Distant supervision. May need one diet consistency restricted    Aspiration/Penetration Risk: Thin liquid deep penetration to level of vocal cords with trace aspiration post wallow.    ·  Risk minimized with lingual hold and chin tuck posture (with this technique inconsistent shallow penetration which cleared with swallow)  Risk post swallow of penetration if oral residue which pools to the pharynx is not cleared  · Risk minimized with persistent clearance swallow between all presentations    Diet Recommendations:  Solid consistency: Regular(with swallow protocol)   Liquid consistency: Thin(with chin tuck posture)   Medication administration: Meds in puree     Compensatory Swallow Strategies:    Small bites/sips, Lingual sweep, Check for pocketing of food on the Left, Remain upright for 30-45 minutes with left sided weakness  Pt was stand pivot transfer    Consistencies Assessed     Reg solid, Soft solid, Mechanical soft solid, Puree, Honey cup, Nectar cup, Nectar  teaspoon, Thin cup    Positioning   Upright in Videofluoroscopic Chair    Indicators of Oral Phase Dysfunction   Oral Phase - Major Contributing Deficits  Weak Lingual Manipulation: All  Reduced Posterior Propulsion: All  Reduced Bolus Control: All  Decreased Bolus Cohesion: Reg solid, Soft solid, Mechanical soft solid, Puree  Lingual / Palatal Residue: All  Premature Bolus Loss to Pharynx: (noted inconsistently across consistencies)      Indicators of Pharyngeal Phase Dysfunction  Pharyngeal Phase - Major Contributing Deficits  Delayed Swallow Initiation: All  Premature Spillage to Valleculae: (inconsistent across consistencies)  Premature Spillage to Pyriform: Thin cup  Reduced Laryngeal Elevation: Thin cup  Pooling Valleculae: All(noted across food consistencies and inconsistent for thick liquids)  Pooling Pyriform: Thin cup  Reduced Airway/laryngeal Closure: Thin cup  Deep Penetration During: Thin cup  Partial Retrieval (deep): Thin cup  Aspiration After: Thin cup  No Cough Reflex: Thin cup  Pharyngeal Residue - Pyriform: (inconsistent trace to min)      Upper Esophageal Phase   Upper Esophageal Screen  Esophageal Screen: (DNT)    MINUTES/CHARGES  Time In: 1303  SLP Individual Minutes  Time In: 8863  Time Out: 1355  Minutes: 40  Coded treatment time karen Tovar,MS,CCC,SLP 0504  Speech and Language Pathologist   5/21/2019 at 2:31 PM

## 2019-05-21 NOTE — PROGRESS NOTES
Chung on 5/13/19. Also has old L wrist fx. Response to previous treatment: Patient with no complaints from previous session  Family / Caregiver Present: Yes(Zariancee present for session)  Referring Practitioner: Dr Ansel Councilman  Diagnosis: R CVA  Subjective  Subjective: Seen in dept, agreed to OT  Vital Signs  Patient Currently in Pain: Denies        Objective                   Transfers  Transfer Comments: CGA from w/c to chair without arms multiple times, good step length                 Neuromuscular Education  Neuromuscular education: Yes  NDT Treatment: Upper extremity  Functional Movement Patterns: Completed Forward Pass with good reciprocal UE motion. Completed shoulder flexion ex with Beachball with BUE. Able to grasp small ball with LUE. Completed UE ex bike. Theraputty with both UEs. AROM ex for shoulder elevation, digit flex and ext., Supination/pronation, scap protration/retraction. Fatigues quickly with movement. Weight Bearing  Weight Bearing Technique: Yes(during transfers, OT faciliated correct alignment for weight bearing when standing or sitting with LUE on surface)                       Upper Extremity Function  NDT Treatment: Upper extremity                       Plan   Plan  Times per week: 5-6  Times per day: Twice a day  Current Treatment Recommendations: Strengthening, Functional Mobility Training, Patient/Caregiver Education & Training, Home Management Training, ROM, Endurance Training, Equipment Evaluation, Education, & procurement, Self-Care / ADL, Safety Education & Training, Neuromuscular Re-education, Balance Training  Plan Comment: Monday Conference       Goals  Short term goals  Time Frame for Short term goals: 1 week   Short term goal 1: Pt will perform bathing with min A and shower chair. Short term goal 2: Pt will perform upper body dressing SBA and lower body dressing min A except Eric Hose. Short term goal 3: Pt will perform toileting with CGA and grab bars PRN.   Short term goal 4: Pt will perform functional mobility / transfers with CGA and LRAD. Short term goal 5: Pt will improve LUE AROM-HEP / strength / coordination through there ex / neuro re-ed to actively use for bilateral ADL tasks 25% of time. Long term goals  Time Frame for Long term goals : 2-3 weeks   Long term goal 1: Pt will perform bathing with mod ind and shower chair   Long term goal 2: Pt will perform dressing with mod ind  Long term goal 3: Pt will perform toileting with mod ind  Long term goal 4: Pt will perform functional mobility / transfers with mod ind and LRAD   Long term goal 5: Pt will improve LUE AROM-HEP / strength / coordination through there ex / neuro re-ed to actively use for bilateral ADL tasks 50% of time. Long term goals 6: Pt will improve balance, L side awareness and activity tolerance to perform object retrieval / transport for simple IADL tasks with supervision. Patient Goals   Patient goals : I want to \"get my limbs back\" to be able to work again as , drive again, and be intimate with my girl friend.         Therapy Time   Individual Concurrent Group Co-treatment   Time In 0915         Time Out 1000         Minutes 45         Timed Code Treatment Minutes: [address] [facility], OT

## 2019-05-21 NOTE — PLAN OF CARE
Ongoing  Note:   Tried to review.   5/21/2019 0042 by Susan Veloz RN  Outcome: Ongoing     Problem: COMMUNICATION IMPAIRMENT  Goal: Ability to express needs and understand communication  Outcome: Ongoing  Note:   Able to express needs

## 2019-05-22 PROCEDURE — 6370000000 HC RX 637 (ALT 250 FOR IP): Performed by: PHYSICAL MEDICINE & REHABILITATION

## 2019-05-22 PROCEDURE — 97535 SELF CARE MNGMENT TRAINING: CPT

## 2019-05-22 PROCEDURE — 97530 THERAPEUTIC ACTIVITIES: CPT

## 2019-05-22 PROCEDURE — 92526 ORAL FUNCTION THERAPY: CPT

## 2019-05-22 PROCEDURE — 97110 THERAPEUTIC EXERCISES: CPT

## 2019-05-22 PROCEDURE — 97127 HC SP THER IVNTJ W/FOCUS COG FUNCJ: CPT

## 2019-05-22 PROCEDURE — 1280000000 HC REHAB R&B

## 2019-05-22 PROCEDURE — 97116 GAIT TRAINING THERAPY: CPT

## 2019-05-22 PROCEDURE — 94760 N-INVAS EAR/PLS OXIMETRY 1: CPT

## 2019-05-22 RX ORDER — TRAZODONE HYDROCHLORIDE 50 MG/1
50 TABLET ORAL NIGHTLY PRN
Status: DISCONTINUED | OUTPATIENT
Start: 2019-05-22 | End: 2019-05-23

## 2019-05-22 RX ADMIN — METOPROLOL TARTRATE 25 MG: 25 TABLET ORAL at 08:11

## 2019-05-22 RX ADMIN — ATORVASTATIN CALCIUM 80 MG: 80 TABLET, FILM COATED ORAL at 21:30

## 2019-05-22 RX ADMIN — AMLODIPINE BESYLATE 10 MG: 10 TABLET ORAL at 08:11

## 2019-05-22 RX ADMIN — TRAZODONE HYDROCHLORIDE 50 MG: 50 TABLET ORAL at 21:30

## 2019-05-22 RX ADMIN — CLOPIDOGREL BISULFATE 75 MG: 75 TABLET, FILM COATED ORAL at 08:11

## 2019-05-22 RX ADMIN — MULTIPLE VITAMINS W/ MINERALS TAB 1 TABLET: TAB at 08:11

## 2019-05-22 RX ADMIN — SENNOSIDES,DOCUSATE SODIUM 2 TABLET: 8.6; 5 TABLET, FILM COATED ORAL at 08:11

## 2019-05-22 RX ADMIN — METOPROLOL TARTRATE 25 MG: 25 TABLET ORAL at 21:30

## 2019-05-22 RX ADMIN — ASPIRIN 81 MG 81 MG: 81 TABLET ORAL at 08:11

## 2019-05-22 ASSESSMENT — PAIN SCALES - GENERAL
PAINLEVEL_OUTOF10: 0
PAINLEVEL_OUTOF10: 0

## 2019-05-22 NOTE — PROGRESS NOTES
Jennifer introduced and completed x10 n/a   Speech/Cognitive goals       Goal 1: The patient will improve speech intelligibility to >90% in conversation via compensatory strategy use and oral motor/vocal strengthening.   -structured sentence level repetition imitative: not targeted    -less structured conversational exchange: 100% intelligible and >90% precision of articulation  n/a     Goal 2: The patient will improve recall for 5 new units over 30 min intervals to >85% with min cues   Recall of AM PT/OT activities with (I) to occ VC  -recalled SLP name and approx name of student SLP from 5/17  -min cues to recall recommendations and CST from [facility] 5/21   n/a     Goal 3: The patient will demonstrate functional planning and organization for functional calculations, money and finance management, med management with intermittent to min cues      Not targeted n/a   Other areas targeted:     Education:   Pt ed in Oral motor speech ex; oral motor ex; compensatory swallow techniques andMBS results/recs    Safety Devices: [x] Call light within reach  [] Chair alarm activated  [x] Bed alarm activated  [] Other: [] Call light within reach  [] Chair alarm activated  [] Bed alarm activated  [] Other:    Assessment: 5/17: Moderately impaired left sided labial, buccal, and lingual strength, ROM and tone which affects pt's intellgibility and articulatory precision at sentence level and oral phase of the swallow (ie, concern for premature spillage, minimal oral residue, increased labor and fatigue at meals). Concern for mild decreased laryngeal elevation and reduced airway protection, but no overt s/s of aspiration during CSE. Mild cognitive impairment with working memory, recall of new information, concern for reduced complex and high level PS, with further assess of money/medication management warranted. 5/20/2019: Pt and staff reporting increase dysphagia symptoms. Pt/staff complaints of increase in left facial droop.

## 2019-05-22 NOTE — PROGRESS NOTES
PHYSICAL THERAPY  Progress Note     Patient Name: Kaz Nassar Record Number: [id number]    Treatment Diagnosis: Difficulty with ambulation/decrease strength secondary to L sided weakness       Chart Reviewed: Yes   Restrictions/Precautions: Fall Risk Other position/activity restrictions: cardiac diet    Additional Pertinent Hx: \"66year-old male with sudden onset of left arm, leg, and facial weakness and numbness on 5/12 despite taking Eliquis. Nikhil Erwinstein He came into the hospital head CT scan showed infarction right basal ganglia area. He does have a history of sickle cell trait. Patient seen by neurology, and switch his medications to aspirin and Plavix. Patient also seen by hematology, and blood work for hypercoagulable state is in progress. MRI results noted acute right basal ganglia infarct. Echo negative for thrombus or shunting. Patient started therapies, but needs more therapy before discharged to home safely due to his left-sided weakness. . \" (Dr. Chelsie Ramirez 5/15/19) Pt reports initial stroke 5/7/19 this date returned to home but states slowly decreased in functional mobility. Subjective: Pt sitting up in w/c; agreeable to working on stairs and ambulation.     Pain Assessment  Pain Assessment: 0-10  Pain Level: 0  Patient's Stated Pain Goal: No pain  Pain Type: Chronic pain  Pain Location: Back  Pain Orientation: Right  Pain Descriptors: Aching  Pain Frequency: Continuous  Pain Onset: On-going  Clinical Progression: Gradually improving  Functional Pain Assessment: Activities are not prevented  Non-Pharmaceutical Pain Intervention(s): Ambulation/Increased Activity, Repositioned  Response to Pain Intervention: Patient Satisfied    Home Environment / Functional History  Lives With: Significant other(Wife--> Amanda \"JESUS MANUEL\")  Type of Home: Apartment(Also has a house, but currently under construction. )  Home Layout: One level(House is one level)  Home Access: Level entry(House has three steps to enter without Assist, Left(w/ace wrap)  Assistance: Contact guard assistance  Quality of Gait: able to advance LLE with AFO, at time leaned to right and hike left hip to advance leg, genurecurvatum on left in mid to termianl stance phase, patient able to reduce recurvatum with cues for left gluteal contraction with L foot initial contact  Distance: 2 x 100'  Comments: able to control L knee control better with tactile cues to activate gluteals    Stairs  Stairs/Curb  Stairs?: Yes  Stairs  # Steps : 12  Stairs Height: 6\"  Rails: Bilateral  Curbs: 6\"(X 1 trial with RW CGA-min assist, with assistance for management LLE including blocking of knee in SLS and proper seqeuncing)  Device: Rolling walker(on curb step)  Other Apparatus: AFO(and L ankle ace wrap)  Assistance: Contact guard assistance  Comment: up/down 12 steps with iram hand rail and non-reciprocal pattern, cues to lead up with R LE and down with L LE; coming away from steps patient had near loss of balance with R hand not being on walker (pt corrected w/o assist). Up/Down curb step with CGA, leading up with R LE and down with L LE    Exercises   Straight Leg Raise: x5 cues to slow down for control, min assist   Heelslides: X 5 LLE with orange maxi sheet with min assist, cues to slow down for control   Hip Abduction: x5 cues to slow down for control, min assist   Comments: STANDING: R hip abduction x 10 reps, R hip flexion x 10 reps, R hip flexion pendulum exercise standing with L LE on 4\" step in // bars x 10 reps with Min Assist, L hip flexion x 10 reps. SEATED: L ankle DF 2 x 10 reps, hip flexion 1 x 10 reps with Max Assist.    Other Activities:  Patient ambulated figure-8 around wedge cushion and bolster several times with walker and Contact Guard Assistance steadily.      Neuro/balance  Posture: Good  Sitting - Static: Good  Sitting - Dynamic: Good  Standing - Static: Good  Standing - Dynamic: Fair, +  Comments: Pt highly unsteady in single leg stance on R LE (Min/Mod I   Long term goal 2: ambulate with LRAD 200' mod I   Long term goal 3: ascend/descend 12 steps unilateral railing mod I   Long term goal 4: ascend/descend curb step with LRAD mod I      Plan:  Times per week: 5-6X ; Times per day: Twice a day  Current Treatment Recommendations: Strengthening, Balance Training, Functional Mobility Training, Transfer Training, ROM, Endurance Training, Wheelchair Mobility Training, Neuromuscular Re-education, Stair training, ADL/Self-care Training, Gait Training, Home Exercise Program, Safety Education & Training, Positioning, Equipment Evaluation, Education, & procurement, Patient/Caregiver Education & Training          Timed Code Treatment Minutes: 45 Minutes         Therapy Time    Individual Concurrent Group Co-treatment   Time In 1430            Time Out 1515          Minutes 45            Timed Code Treatment Minutes: 45 Minutes        Electronically signed by Agatha Shah PT on 5/22/2019 at 4:34 PM

## 2019-05-22 NOTE — PROGRESS NOTES
Department of Physical Medicine & Rehabilitation  Progress Note    Patient Identification:  Burns Severance  [id number]  : 1955  Admit date: 5/15/2019    Chief Complaint: Right-sided cerebrovascular accident (CVA) ([address]    Subjective:   No acute events overnight. Patient seen this am sitting up in gym. Feels well but having ongoing difficulty with poor sleep. ROS: No f/c, n/v, cp     Objective:  Patient Vitals for the past 24 hrs:   BP Temp Temp src Pulse Resp SpO2   19 0804 129/68 -- -- 90 -- --   19 0410 120/81 -- -- 64 -- --   19 0404 -- 98.2 °F (36.8 °C) Oral 60 16 97 %   19 2356 122/81 98.3 °F (36.8 °C) Oral 54 18 97 %   19 2005 129/88 98.4 °F (36.9 °C) Oral 75 16 96 %   19 1630 125/80 98.4 °F (36.9 °C) Oral 70 18 98 %   19 1354 116/83 98.3 °F (36.8 °C) Oral 72 18 97 %   19 1100 122/78 98 °F (36.7 °C) Oral 54 16 98 %   19 1002 -- -- -- -- 16 92 %     Const: Alert. No distress, pleasant. HEENT: Normocephalic, atraumatic. Normal sclera/conjunctiva. MMM. CV: Regular rate and rhythm. Resp: No respiratory distress. Lungs CTAB. Abd: NABS+. Soft, nontender, nondistended   Ext: No edema. Neuro: Alert, oriented, appropriately interactive. +Dysarthria. Left hemiparesis. Psych: Cooperative, appropriate mood and affect    Laboratory data: Available via EMR. Last 24 hour lab  No results found for this or any previous visit (from the past 24 hour(s)).     Therapy progress:  PT  Position Activity Restriction  Other position/activity restrictions: cardiac diet   Objective     Sit to Stand: Contact guard assistance(VC for L foot placement/hand placement with cues for proper sequencing )  Stand to sit: Contact guard assistance, Minimal Assistance(VC for hand placement and slow down for safety, slight assist for eccentric descent )  Bed to Chair: Minimal assistance(squat pivot from bed to chair towards right, tolerated well)  Stand Pivot Transfers:

## 2019-05-22 NOTE — PROGRESS NOTES
Patient is a 65 y/o M admitted to rehab with CVA. A/A/O x 4. NSR on tele, VSS. Transfers with assist of CGA with walker. On general diet, tolerating well. Medications taken whole in water, refuses recommendation to take with applesauce. On aspirin  for DVT prophylaxis. Loop recorder site healing well. On room air. Has been continent of urine and stool. [facility] 5/21. Will monitor for safety.

## 2019-05-22 NOTE — PROGRESS NOTES
PHYSICAL THERAPY  Progress Note     Patient Name: Kaz Nassar Record Number: [id number]    Treatment Diagnosis: Difficulty with ambulation/decrease strength secondary to L sided weakness       Chart Reviewed: Yes   Restrictions/Precautions: Fall Risk Other position/activity restrictions: cardiac diet    Additional Pertinent Hx: \"66year-old male with sudden onset of left arm, leg, and facial weakness and numbness on 5/12 despite taking Eliquis. Eura Alejandrina He came into the hospital head CT scan showed infarction right basal ganglia area. He does have a history of sickle cell trait. Patient seen by neurology, and switch his medications to aspirin and Plavix. Patient also seen by hematology, and blood work for hypercoagulable state is in progress. MRI results noted acute right basal ganglia infarct. Echo negative for thrombus or shunting. Patient started therapies, but needs more therapy before discharged to home safely due to his left-sided weakness. . \" (Dr. Katharina Mukherjee 5/15/19) Pt reports initial stroke 5/7/19 this date returned to home but states slowly decreased in functional mobility. Subjective: Pt eager to work on his left leg agility; wants to get his gait quality to improve.     Pain Assessment  Pain Assessment: 0-10  Pain Level: 0  Patient's Stated Pain Goal: No pain  Pain Type: Chronic pain  Pain Location: Back  Pain Orientation: Right  Pain Descriptors: Aching  Pain Frequency: Continuous  Pain Onset: On-going  Clinical Progression: Gradually improving  Functional Pain Assessment: Activities are not prevented  Non-Pharmaceutical Pain Intervention(s): Ambulation/Increased Activity, Repositioned  Response to Pain Intervention: Patient Satisfied    Home Environment / Functional History  Lives With: Significant other(Wife--> Amanda \"JESUS MANUEL\")  Type of Home: Apartment(Also has a house, but currently under construction. )  Home Layout: One level(House is one level)  Home Access: Level entry(House has three steps

## 2019-05-22 NOTE — PROGRESS NOTES
surgical history that includes Wrist fracture surgery (Left, 1979). Restrictions  Restrictions/Precautions  Restrictions/Precautions: Fall Risk  Position Activity Restriction  Other position/activity restrictions: cardiac diet   Subjective   General  Chart Reviewed: Yes  Patient assessed for rehabilitation services?: Yes  Additional Pertinent Hx: \"Patient is a 66 y.o. male who presents d/t Left-sided weakness and facial droop. Pt was recently admitted from 5/7-5/9 with left face, arm, and leg weakness with dysarthria; given tPA and his sympotms resolved. He had a full workup including and MRI showing right parietal lobe infarct and loop recorder placed by cardiology. MRI date indicated Acute infarct centered in right basal ganglia. \" copied per acute chart note by Gladis Devlin on 5/13/19. Also has old L wrist fx. Response to previous treatment: Patient with no complaints from previous session  Family / Caregiver Present: No  Referring Practitioner: Dr Jimbo Morgan  Diagnosis: R CVA  Subjective  Subjective: Met pt supine in bed ordering breakfast. Pt agreeable to ADL shower session and denied pain. Orientation  Orientation  Overall Orientation Status: Within Functional Limits  Objective    ADL  Feeding: Setup(Opened containers )  Grooming: Setup(Washed face/hands seated in shower. Brushed teeth seated at sink. Denied combing hair as pt said it was unnecessary after shower. Pt requested to shave but unable d/t limited therapy time.)  UE Bathing: Stand by assistance(Seated in shower chair. Washed LUE by holding wash cloth in RUE and moving LUE. Stance to wash genitals with CGA. )  LE Bathing: Contact guard assistance;Stand by assistance(Seated in shower chair with SBA. Long hand sponge to wash toes / bottom of feet / posterior thighs.   Stance for hiram-anal care and to wash posterior thighs with CGA)  UE Dressing: Stand by assistance(Pt verbalized need to dress L side first when donning shirt)  LE

## 2019-05-23 LAB
ANION GAP SERPL CALCULATED.3IONS-SCNC: 10 MMOL/L (ref 3–16)
BASOPHILS ABSOLUTE: 0 K/UL (ref 0–0.2)
BASOPHILS RELATIVE PERCENT: 1.2 %
BUN BLDV-MCNC: 14 MG/DL (ref 7–20)
CALCIUM SERPL-MCNC: 9.1 MG/DL (ref 8.3–10.6)
CHLORIDE BLD-SCNC: 108 MMOL/L (ref 99–110)
CO2: 26 MMOL/L (ref 21–32)
CREAT SERPL-MCNC: 0.9 MG/DL (ref 0.8–1.3)
EOSINOPHILS ABSOLUTE: 0.1 K/UL (ref 0–0.6)
EOSINOPHILS RELATIVE PERCENT: 3.2 %
GFR AFRICAN AMERICAN: >60
GFR NON-AFRICAN AMERICAN: >60
GLUCOSE BLD-MCNC: 105 MG/DL (ref 70–99)
HCT VFR BLD CALC: 43.2 % (ref 40.5–52.5)
HEMOGLOBIN: 14.6 G/DL (ref 13.5–17.5)
LYMPHOCYTES ABSOLUTE: 1.6 K/UL (ref 1–5.1)
LYMPHOCYTES RELATIVE PERCENT: 39.5 %
MCH RBC QN AUTO: 29.3 PG (ref 26–34)
MCHC RBC AUTO-ENTMCNC: 33.7 G/DL (ref 31–36)
MCV RBC AUTO: 86.8 FL (ref 80–100)
MONOCYTES ABSOLUTE: 0.4 K/UL (ref 0–1.3)
MONOCYTES RELATIVE PERCENT: 10 %
NEUTROPHILS ABSOLUTE: 1.8 K/UL (ref 1.7–7.7)
NEUTROPHILS RELATIVE PERCENT: 46.1 %
PDW BLD-RTO: 13.7 % (ref 12.4–15.4)
PLATELET # BLD: 262 K/UL (ref 135–450)
PMV BLD AUTO: 7.2 FL (ref 5–10.5)
POTASSIUM REFLEX MAGNESIUM: 4.3 MMOL/L (ref 3.5–5.1)
RBC # BLD: 4.98 M/UL (ref 4.2–5.9)
SODIUM BLD-SCNC: 144 MMOL/L (ref 136–145)
WBC # BLD: 3.9 K/UL (ref 4–11)

## 2019-05-23 PROCEDURE — 36415 COLL VENOUS BLD VENIPUNCTURE: CPT

## 2019-05-23 PROCEDURE — 94760 N-INVAS EAR/PLS OXIMETRY 1: CPT

## 2019-05-23 PROCEDURE — 97535 SELF CARE MNGMENT TRAINING: CPT

## 2019-05-23 PROCEDURE — 85025 COMPLETE CBC W/AUTO DIFF WBC: CPT

## 2019-05-23 PROCEDURE — 97112 NEUROMUSCULAR REEDUCATION: CPT

## 2019-05-23 PROCEDURE — 80048 BASIC METABOLIC PNL TOTAL CA: CPT

## 2019-05-23 PROCEDURE — 97530 THERAPEUTIC ACTIVITIES: CPT

## 2019-05-23 PROCEDURE — 92526 ORAL FUNCTION THERAPY: CPT

## 2019-05-23 PROCEDURE — 97110 THERAPEUTIC EXERCISES: CPT

## 2019-05-23 PROCEDURE — 6370000000 HC RX 637 (ALT 250 FOR IP): Performed by: PHYSICAL MEDICINE & REHABILITATION

## 2019-05-23 PROCEDURE — 92507 TX SP LANG VOICE COMM INDIV: CPT

## 2019-05-23 PROCEDURE — 97116 GAIT TRAINING THERAPY: CPT

## 2019-05-23 PROCEDURE — 97127 HC SP THER IVNTJ W/FOCUS COG FUNCJ: CPT

## 2019-05-23 PROCEDURE — 1280000000 HC REHAB R&B

## 2019-05-23 RX ORDER — TRAZODONE HYDROCHLORIDE 100 MG/1
100 TABLET ORAL NIGHTLY PRN
Status: DISCONTINUED | OUTPATIENT
Start: 2019-05-23 | End: 2019-06-01 | Stop reason: HOSPADM

## 2019-05-23 RX ADMIN — METOPROLOL TARTRATE 25 MG: 25 TABLET ORAL at 19:18

## 2019-05-23 RX ADMIN — MULTIPLE VITAMINS W/ MINERALS TAB 1 TABLET: TAB at 08:34

## 2019-05-23 RX ADMIN — METOPROLOL TARTRATE 25 MG: 25 TABLET ORAL at 08:34

## 2019-05-23 RX ADMIN — ASPIRIN 81 MG 81 MG: 81 TABLET ORAL at 08:34

## 2019-05-23 RX ADMIN — SENNOSIDES,DOCUSATE SODIUM 2 TABLET: 8.6; 5 TABLET, FILM COATED ORAL at 19:18

## 2019-05-23 RX ADMIN — CLOPIDOGREL BISULFATE 75 MG: 75 TABLET, FILM COATED ORAL at 08:34

## 2019-05-23 RX ADMIN — TRAZODONE HYDROCHLORIDE 100 MG: 100 TABLET ORAL at 20:57

## 2019-05-23 RX ADMIN — AMLODIPINE BESYLATE 10 MG: 10 TABLET ORAL at 08:35

## 2019-05-23 RX ADMIN — ATORVASTATIN CALCIUM 80 MG: 80 TABLET, FILM COATED ORAL at 19:18

## 2019-05-23 ASSESSMENT — PAIN SCALES - GENERAL
PAINLEVEL_OUTOF10: 0

## 2019-05-23 NOTE — PROGRESS NOTES
Pt appears to be asleep. Lying on left side with eyes closed.  Was medicated with trazodone per request.

## 2019-05-23 NOTE — PROGRESS NOTES
Comments: Responsible for paying bills via online and checks   Additional Comments: Patient reports one fall since his stroke the past few days. Patient currently remodeling his house which is 3 [address]OST[address] enter house, 1 level, 1 tub/shower      Restrictions  Restrictions/Precautions  Restrictions/Precautions: Fall Risk  Position Activity Restriction  Other position/activity restrictions: cardiac diet   Subjective   General  Chart Reviewed: Yes  Additional Pertinent Hx: \"66year-old male with sudden onset of left arm, leg, and facial weakness and numbness on 5/12 despite taking Eliquis. Frutoso Golder He came into the hospital head CT scan showed infarction right basal ganglia area. He does have a history of sickle cell trait. Patient seen by neurology, and switch his medications to aspirin and Plavix. Patient also seen by hematology, and blood work for hypercoagulable state is in progress. MRI results noted acute right basal ganglia infarct. Echo negative for thrombus or shunting. Patient started therapies, but needs more therapy before discharged to home safely due to his left-sided weakness. . \" (Dr. Luna Ax 5/15/19) Pt reports initial stroke 5/7/19 this date returned to home but states slowly decreased in functional mobility. Response To Previous Treatment: Patient with no complaints from previous session. Family / Caregiver Present: No  Referring Practitioner: Dr. Jimenez/Dr. Radhika Escamilla   Subjective  Subjective: Pt sitting in w/c at beginning of session. Pt reports feeling well and agreeable to PT treatment session.    Pain Screening  Patient Currently in Pain: Denies  Pain Assessment  Pain Assessment: 0-10  Pain Level: 0  Patient's Stated Pain Goal: No pain  Vital Signs  Patient Currently in Pain: Denies       Orientation  Orientation  Overall Orientation Status: Within Normal Limits  Orientation Level: Oriented X4    Objective      Transfers  Sit to Stand: Stand by assistance  Stand to sit: Contact guard assistance  Comment: VC for hand

## 2019-05-23 NOTE — PATIENT CARE CONFERENCE
solids d/t deep penetration with thin liquids and trace aspiration after the swallow, which is eliminated with chin tuck. Plan to initiate NMES dysphagia tx pending MD approval.    Cognition: Recall of new functional information improving with (I) to min/ Set up cues. Simple functional math skills (I) to intermittent cues. Dysarthria improving to >90% articulatory precision in connected speech. Continued Left sided oral motor weakness and decreased tone with gradual improvement. Pt is motivated and practicing [address] [place] [address] program on his own. Pearl Rose, MS, CCC-SLP #[id number]      OCCUPATIONAL THERAPY    ADL  Feeding: Setup(Opened containers )  Grooming: Setup(Washed face/hands seated in shower. Brushed hair / teeth seated at sink. Asked to shave but unable d/t limited therapy time. Plan to shave in PM)  UE Bathing: Stand by assistance, Setup(Seated in shower chair. Hand held shower head. Washed RUE with LUE and increased LUE movement compared to prior. Able to cross midline to grasp soap bottle with LUE. Stance to wash genitals with grab bar (as @ home) and SBA. Denied washing hair.  )  LE Bathing: Stand by assistance, Setup, Contact guard assistance(Long mony sponge for bilateral feet. Crossed LLE onto R thigh for L foot. Stance for hiram-anal hygiene with grab bars (as @ home) with SBA-CGA. )  UE Dressing: Stand by assistance, Setup  LE Dressing: Contact guard assistance(Donned underwear, pants, and shoes seated. Opted to not don socks with shoes. Stance to pull up underwear / pants with grab bars and CGA. Pt able to tie pant string with CGA. )  Additional Comments: Improved LUE function observed during shower session, reaching for soap, manipulating wash cloth.      Bed mobility  Bridging: Unable to assess  Rolling to Left: Unable to assess  Rolling to Right: Unable to assess  Supine to Sit: Modified independent  Sit to Supine: Modified independent  Scooting: Unable to assess  Comment: HOB raised achievement of predicted outcomes/Interventions:  Compensation, weakness with L side, impulsive--> interventions to include but not limited to promotion of L side body with all functional transfers/tasks, strengthening exercises, compensatory, compensatory memory strategies, and continuous education for safety awareness especially with movements of L side. Interdisciplinary Individualized Plan of Care Review:    · Continue Current Plan of Care: Yes    · Modifications:_____________________________    Special Needs in the Upcoming Week :    [] Family/Caregiver Education  [] Home visit  []Therapeutic Pass   [] Consults:_______    [] Other;_______    Patient Rehab Team Goals for the Upcoming Week:  1. Independent with transfer and ambulation ability with least restrictive device mod I across all disciplines. 2. Pt will perform ADL tasks with decreased assist across disciplines, with increased LUE function. 3. Pt will demonstrate carryover with new learning safety strategies with set up and <mild reminders        Team Members Present at Conference:  Physician: Dr. Lisa Jarrett  : LILI Tan    Occupational Therapist: Yessenia Leon,  OTR/L #[id number]  Physical Therapist: Jerry Moe PT, DPT [id number]  Speech Therapist: Robert Finnegan. La,MS,CCC,SLP [id number]  Nurse:  Dietician:  Psychologist:  Other:      I led this team conference and I approve the established interdisciplinary plan of care as documented within the medical record of Eliz Tapia. MD: Carrol Moreno.  Lisa Jarrett MD 5/24/2019, 10:21 AM

## 2019-05-23 NOTE — PATIENT CARE CONFERENCE
own. Pt completed gross motor coordination activity with observed compensatory movement and decreased accuracy. Pt completed first car transfer CGA with sit and swing technique and one verbal cue for hand placement. Pt would benefit from continued OT to address ADL retraining, functional mobility transfers, LUE gross & fine motor coordination / strength / AROM. Continue tx per POC. Rec pt to cont on rehab another 1-1.5 weeks. Will need home OT post D/C. DME: Pt has 3 grab bars in walk in shower, grab bar L of toilet, has comfort ht toilets. Pt will likely need a shower chair, possibly walker bag/basket or tray & reacher. NUTRITION  Most recent weightWeight: 151 lb 0.2 oz (68.5 kg)  BSA (Calculated - sq m): 1.89 sq meters  BMI (Calculated): 20.5  Please see nutrition note for details. NURSING  Bladder 6, bowel 6, monitor and maintain skin integrity, monitor loop recorder site. Family Education: Patient education: CVA, swallowing precautions, safety/fall prevention, medications, pain control, smoking cessation, skin care/prevention, instruct to monitor skin care for AFO.     MEDICAL  Adjusting meds to help with sleep    TEAM SUMMARY AND DISCHARGE PLAN  Estimated Length of Stay:1 week  Destination: home health  · Anticipated Services at Discharge:    [x] OT  [x] PT   [x] SLP    [] RN   [] Home Health aide []   Community Resources: _______________________________  Equipment recommendations:  [] Hospital bed [] Tub bench  [x] Shower chair [] Hand held shower  [] Raised toilet seat [] Toilet safety frame [] Bedside commode   [] W/C: _____  [x] Dilia Concepcion [] Standard walker [] Gait belt [] cane: _________  [] Sliding board [] Alternate seating/furniture [] O2 [] Hip Kit: _______  [] Life Line [] Other: _possibly_reacher, walker bag/basket or tray_____  Factors facilitating achievement of predicted outcomes: Friend support, Motivated, Cooperative, Pleasant and Sense of humor  Barriers to the

## 2019-05-23 NOTE — PLAN OF CARE
Problem: Falls - Risk of:  Goal: Will remain free from falls  Description  Will remain free from falls  Outcome: Ongoing  Goal: Absence of physical injury  Description  Absence of physical injury  Outcome: Ongoing     Problem: Pain:  Goal: Control of acute pain  Description  Control of acute pain  Outcome: Ongoing     Problem: ACTIVITY INTOLERANCE/IMPAIRED MOBILITY  Goal: Mobility/activity is maintained at optimum level for patient  Outcome: Ongoing     Problem: COMMUNICATION IMPAIRMENT  Goal: Ability to express needs and understand communication  Outcome: Ongoing

## 2019-05-23 NOTE — PROGRESS NOTES
them.                                             Car Transfers  Car - Transfer From: Other(RW)  Car - Technique: Ambulating  Car Transfers: Contact guard  Car Transfers: Educated on and demonstrated sit and swing method prior to transfer, pt verbalized understanding. One verbal cue for hand placement on dash during transfer. Pt's used handle bar (as in pt's car)  to position hips in car seat. Second Session:     Subjective: Met pt in dept and agreeable to tx. Denied pain. HEP for LUE: Reviewed AROM HEP. 10 reps of 12 exercises. Educated pt to stop exercises if painful. Purpose to increase AROM required for ADL / IADL tasks. Standing Tolerance / Weight bearing through LUE: Connect four standing at table with w/c behind and CGA-SBA. Weight bearing through LUE onto table with dycem. Pt stood for 3.5 minutes before requiring seated rest break. Functional Transfer / Mobility: Amb approximately 15 feet <> bathroom in dept with RW and CGA twice. RW <> raised toilet seat with rails and CGA. RW <> comfort ht toilet (TSF) and CGA. Concluded session with pt in care of volunteer transporter. Assessment: Pt is progressing with toilet transfers and LUE AROM. Continue tx per POC to address ADL retraining, functional mobility / transfers, LUE strength / coordination, and simple IADLs.      Safety Device - Type of devices:  []  All fall risk precautions in place [] Bed alarm in place  [] Call light within reach [] Chair alarm in place [] Positioning belt [x] Gait belt [] Patient at risk for falls [] Left in bed [] Left in chair [] Telesitter in use [] Sitter present [] Nurse notified []  None       Plan   Plan  Times per week: 5-6  Times per day: Twice a day  Plan weeks: 2-3 weeks   Current Treatment Recommendations: Strengthening, Functional Mobility Training, Patient/Caregiver Education & Training, Home Management Training, ROM, Endurance Training, Equipment Evaluation, Education, & procurement, Self-Care / patient.       Charlette Cisneros, OTR/L #[id number]

## 2019-05-23 NOTE — PROGRESS NOTES
Speech Language Pathology  ACUTE REHAB UNIT  SPEECH/LANGUAGE PATHOLOGY      [x] Daily  [x] Weekly Care Conference Note  [] Discharge    Patient:Nick Hillman  FOF:[id number]  Rehab Dx/Hx: Right-sided cerebrovascular accident (CVA) ([address] [I63.9]    Precautions: falls  Home situation: lives in apartment with girlfriend, independent   ST Dx: [] Aphasia  [x] Dysarthria  [] Apraxia   [x] Oropharyngeal dysphagia [x] Cognitive Impairment  [] Other:   Date of Admit: 5/15/2019  Room #: M5S-0201/3259-01  Date: 5/23/2019       Current functional status (updated daily):         Pt being seen for : [] Speech/Language Treatment  [x] Dysphagia Treatment [x] Cognitive Treatment  [x] Other: dysarthria  Current Diet Order:DIET CARDIAC;    Communication: []WFL  [] Aphasia  [x] Dysarthria  [] Apraxia  [] Pragmatic Impairment [] Non-verbal  [] Hearing Loss  [] Other:   Cognition: [] WFL  [x] Mild  [] Moderate  [] Severe [] Unable to Assess  [] Other:  Memory: [] WFL  [x] Mild  [] Moderate  [] Severe [] Unable to Assess  [] Other:  Behavior: [x] Alert  [x] Cooperative  []  Pleasant  [] Confused  [] Agitated  [] Uncooperative  [] Distractible [x] Motivated  [] Self-Limiting [] Anxious  [] Other:  Endurance:  [x] Adequate for participation in SLP sessions  [] Reduced overall  [] Lethargic  [] Other:  Safety: [x] No concerns at this time  [] Reduced insight into deficits  []  Reduced safety awareness [] Not following call light procedures  [] Unable to Assess  [] Other:  Swallowing Precautions: Sit up for all meals and thereafter for 30 minutes, Eat with small bites (1/2 tsp; 1 tsp), Alternate solids with liquids and Check mouth for food residue after snacks and meals, Chin Tuck w liquids, chin tuck and double swallow w solids  Barriers toward progress: Upper extremity weakness and Lower extremity weakness  Discharge recommendations:  [] Home independently  [x] Home with assistance []  24 hour supervision  [] complex and high level PS, with further assess of money/medication management warranted. 5/20/2019: Pt and staff reporting increase dysphagia symptoms. Pt/staff complaints of increase in left facial droop. Re-assessment of oral motor speech; oral motor rom/oral sensory for gag and palatal response; skilled therapy trials. Pt this date self reports chin tuck helps but persistent complaints of sensation something sticking left side of throat (pointing to mid neck). Will request MBSS  5/22/19: MBS results from 5/21 recommend continue regular diet texture and thin liquids with chin tuck w all consistencies and dry swallow w solids d/t deep penetration with thin liquids and trace aspiration after the swallow, which is eliminated with chin tuck. Consider NMES.  5/23/19: Recall of new functional information improving with (I) to min/ Set up cues. Simple functional math skills (I) to intermittent cues. Dysarthria improving to >90% articulatory precision in connected speech. Plan: Continue as per plan of care.       Additional information:     Interventions used during rehab stay:  [] Speech/Language Treatment  [] Instruction in HEP [] Group [x] Dysphagia Treatment [x] Cognitive Treatment   [x] Other: O-M Speech for dysarthria/articulation    Adverse Reactions to Treatment/Significant Change in Status: no    Electronically Signed by    Bernardo Raymundo MS, CCC-SLP #[id number]  Speech and Language Pathologist

## 2019-05-23 NOTE — PROGRESS NOTES
finding on CT. Will need thyroid US as outpatient. Bowel - Scheduled colace+senna. PRN MoM and bisacodyl supp. Bladder - Monitor PVRs. Straight cath prn >300cc. Sleep - Trazodone increased    Rehab Progress: Making progress with mobility, compensatory strategies, dysphagia, dysarthria. Barriers include left side weakness and cognitive deficits. Anticipated Dispo: Home alone vs with significant other  Services/DME: TBD  ELOS: ~3 weeks total      Mahogany Barrera MD 5/23/2019, 4:24 PM

## 2019-05-24 PROCEDURE — 92526 ORAL FUNCTION THERAPY: CPT

## 2019-05-24 PROCEDURE — 97112 NEUROMUSCULAR REEDUCATION: CPT

## 2019-05-24 PROCEDURE — 97530 THERAPEUTIC ACTIVITIES: CPT

## 2019-05-24 PROCEDURE — 6370000000 HC RX 637 (ALT 250 FOR IP): Performed by: PHYSICAL MEDICINE & REHABILITATION

## 2019-05-24 PROCEDURE — 1280000000 HC REHAB R&B

## 2019-05-24 PROCEDURE — 92507 TX SP LANG VOICE COMM INDIV: CPT

## 2019-05-24 PROCEDURE — 94760 N-INVAS EAR/PLS OXIMETRY 1: CPT

## 2019-05-24 PROCEDURE — 97116 GAIT TRAINING THERAPY: CPT

## 2019-05-24 PROCEDURE — 97535 SELF CARE MNGMENT TRAINING: CPT

## 2019-05-24 RX ADMIN — SENNOSIDES,DOCUSATE SODIUM 2 TABLET: 8.6; 5 TABLET, FILM COATED ORAL at 21:38

## 2019-05-24 RX ADMIN — ATORVASTATIN CALCIUM 80 MG: 80 TABLET, FILM COATED ORAL at 21:38

## 2019-05-24 RX ADMIN — MULTIPLE VITAMINS W/ MINERALS TAB 1 TABLET: TAB at 09:10

## 2019-05-24 RX ADMIN — AMLODIPINE BESYLATE 10 MG: 10 TABLET ORAL at 09:10

## 2019-05-24 RX ADMIN — METOPROLOL TARTRATE 25 MG: 25 TABLET ORAL at 09:10

## 2019-05-24 RX ADMIN — ASPIRIN 81 MG 81 MG: 81 TABLET ORAL at 09:10

## 2019-05-24 RX ADMIN — METOPROLOL TARTRATE 25 MG: 25 TABLET ORAL at 21:38

## 2019-05-24 RX ADMIN — CLOPIDOGREL BISULFATE 75 MG: 75 TABLET, FILM COATED ORAL at 09:10

## 2019-05-24 RX ADMIN — SENNOSIDES,DOCUSATE SODIUM 2 TABLET: 8.6; 5 TABLET, FILM COATED ORAL at 09:15

## 2019-05-24 ASSESSMENT — PAIN SCALES - GENERAL
PAINLEVEL_OUTOF10: 0

## 2019-05-24 ASSESSMENT — 9 HOLE PEG TEST
TESTTIME_SECONDS: 111.23
TESTTIME_SECONDS: 26.33
TEST_RESULT: IMPAIRED

## 2019-05-24 NOTE — PLAN OF CARE
Problem: Risk for Impaired Skin Integrity  Goal: Tissue integrity - skin and mucous membranes  Description  Structural intactness and normal physiological function of skin and  mucous membranes. Outcome: Ongoing  Note:   Patient is at risk for impaired skin integrity. Pt is able to turn and reposition self as needed. Skin is assessed every shift and prn. Patient has pillow support, to help reduce skin breakdown. Will continue to monitor. Problem: Falls - Risk of:  Goal: Will remain free from falls  Description  Will remain free from falls  Outcome: Ongoing  Note:   Patient is a high fall risk. Patient free of falls this shift. Bed low, locked and alarmed at all times. Call light and bedside table is within reach. Arm band on wrist, fall light on outside of room, and nonskid socks are on patient. Notified patient to ask for assistance when needed. Patient verbalized understanding. Problem: Pain:  Goal: Control of acute pain  Description  Control of acute pain  Outcome: Ongoing  Note:   Patient denies any pain at this time. Will continue to monitor.

## 2019-05-24 NOTE — PROGRESS NOTES
for paying bills via online and checks   Additional Comments: Patient reports one fall since his stroke the past few days. Patient currently remodeling his house which is 3 [address] enter house, 1 level, 1 tub/shower      Restrictions  Restrictions/Precautions  Restrictions/Precautions: Fall Risk  Position Activity Restriction  Other position/activity restrictions: cardiac diet   Subjective   General  Chart Reviewed: Yes  Additional Pertinent Hx: \"66year-old male with sudden onset of left arm, leg, and facial weakness and numbness on 5/12 despite taking Eliquis. 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 He does have a history of sickle cell trait. Patient seen by neurology, and switch his medications to aspirin and Plavix. Patient also seen by hematology, and blood work for hypercoagulable state is in progress. MRI results noted acute right basal ganglia infarct. Echo negative for thrombus or shunting. Patient started therapies, but needs more therapy before discharged to home safely due to his left-sided weakness. . \" (Dr. Devin Muñiz 5/15/19) Pt reports initial stroke 5/7/19 this date returned to home but states slowly decreased in functional mobility. Response To Previous Treatment: Patient with no complaints from previous session. Family / Caregiver Present: No  Referring Practitioner: Dr. Jimenez/Dr. Mary Baxter   Subjective  Subjective: Pt sitting in w/c in room upon arrival. Pt reports feeling well and excited to have increased movement this date with L side. Pt agreeable to PT treatment session.    Pain Screening  Patient Currently in Pain: Denies  Pain Assessment  Pain Assessment: 0-10  Pain Level: 0  Patient's Stated Pain Goal: No pain  Vital Signs  Patient Currently in Pain: Denies       Orientation  Orientation  Overall Orientation Status: Within Normal Limits    Objective      Transfers  Sit to Stand: Contact guard assistance(pt stands impulsive this date with posterior LOB agreeable to Pt treatment session. Pt began to inquire again pertaining to interest in exercises to help promote functional mobility/positioning for sexual intercourse. O: Pt provided with exercises sheet pertaining to anterior pelvic tilts, posterior pelvic tilts, lateral lower trunk rotation, and bridging exercises to promote increased abdominal stability. Pt receptive to exercises home program. Educated patient to discuss further/clearance from MD. Pt sit to stand SBA, ambulated 150' back to room at completion of session with RW CGA (slight decreased L foot clearance this afternoon due to increased fatigue requires VC for safety), stand to sitting EOB SBA. Sitting EOB to supine mod I with increased time lifting LLE with RUE. A: Pt tolerated PM session well despite increased fatigue. Continue to progress treatment per POC. Safety Device - Type of devices:  [x]  All fall risk precautions in place [x] Bed alarm in place  [x] Call light within reach [] Chair alarm in place [] Positioning belt [x] Gait belt [x] Patient at risk for falls [x] Left in bed [] Left in chair [] Telesitter in use [] Sitter present [] Nurse notified []  None       Assessment   Body structures, Functions, Activity limitations: Decreased functional mobility ; Decreased endurance;Decreased ROM; Decreased strength;Decreased safe awareness;Decreased balance;Decreased coordination;Decreased fine motor control;Decreased sensation  Assessment: Pt demonstrated improvement with activation of muscles with LLE this date with faciliation, increased time to perform. Pt does well to compensate with all mobility but has great potential for partial return if continued high level of therapy to promote return to higher level functional abilities. Pt very receptive to training this date and motivated to participate.    Pt benefit from home PT to promote increased activation of L side body with functional mobility tasks, increased ability to complete transfers with decreased assistance, improve higher level balance, and improve ambulation to promote ability to return home. Treatment Diagnosis: Difficulty with ambulation/decrease strength secondary to L sided weakness   Prognosis: Good  Patient Education: promoting use of L side for all mobility tasks to promote return vs compensation, safety with all transfers including hand placement; neuro re education   REQUIRES PT FOLLOW UP: Yes  Activity Tolerance  Activity Tolerance: Patient Tolerated treatment well; Other;Patient limited by endurance  Activity Tolerance: compensates well but attempting to promote recovery     Goals  Short term goals  Time Frame for Short term goals: 1 week  Short term goal 1: sit <-> stand transfers CGA-met   Short term goal 2: ambulate 150 ft with LRAD CGA   Short term goal 3: ascend/descend 4 steps unilateral railing with assistance - met   Short term goal 4: ascend/descend curb step with LRAD and assistance. - met   Long term goals  Time Frame for Long term goals : 2-3 weeks   Long term goal 1: sit <-> stand transfer mod I   Long term goal 2: ambulate with LRAD 200' mod I   Long term goal 3: ascend/descend 12 steps unilateral railing mod I   Long term goal 4: ascend/descend curb step with LRAD mod I  Patient Goals   Patient goals : \"get well, get finished with my house remodel and retire\"     Plan    Plan  Times per week: 5-6X   Times per day: Twice a day  Plan weeks: 2-3  Current Treatment Recommendations: Strengthening, Balance Training, Functional Mobility Training, Transfer Training, ROM, Endurance Training, Wheelchair Mobility Training, Neuromuscular Re-education, Stair training, ADL/Self-care Training, Gait Training, Home Exercise Program, Safety Education & Training, Positioning, Equipment Evaluation, Education, & procurement, Patient/Caregiver Education & Training  Safety Devices  Type of devices:  All fall risk precautions in place, Gait belt, Patient at risk for falls, Left

## 2019-05-24 NOTE — CARE COORDINATION
Team Conference held today. Team reviewed progress and goals. Team reports he continues in all three therapies. DME recommendation:   Shower chair with a back and [address]. He is on regular foods with thin liquids. Team recommends DC to home on Saturday 6 - 1 with home care orders for SN/PT/OT/SP. LSW met with patient to review. He agrees with this plan. He feels he is making great progress. LSW left list of home care agencies in the room as he could not find the other one. Call placed to Karri wilkins to give this update. She is very pleased and agrees with this plan. Next Team Conference will be Wed 5-. DC planned for Sat 6-1-2019.   [facility]     Case Management   [phone]    5/24/2019  1:53 PM

## 2019-05-24 NOTE — PROGRESS NOTES
Speech Language Pathology  ACUTE REHAB UNIT  SPEECH/LANGUAGE PATHOLOGY      [x] Daily  [] Weekly Care Conference Note  [] Discharge    Patient:iNck Bartlett  CIT:[id number]  Rehab Dx/Hx: Right-sided cerebrovascular accident (CVA) ([facility] [address] [I63.9]    Precautions: falls  Home situation: lives in apartment with girlfriend, independent   ST Dx: [] Aphasia  [x] Dysarthria  [] Apraxia   [x] Oropharyngeal dysphagia [x] Cognitive Impairment  [] Other:   Date of Admit: 5/15/2019  Room #: A1T-5753/3259-01  Date: 5/24/2019       Current functional status (updated daily):         Pt being seen for : [] Speech/Language Treatment  [x] Dysphagia Treatment [x] Cognitive Treatment  [x] Other: dysarthria  Current Diet Order:DIET CARDIAC;    Communication: []WFL  [] Aphasia  [x] Dysarthria  [] Apraxia  [] Pragmatic Impairment [] Non-verbal  [] Hearing Loss  [] Other:   Cognition: [] WFL  [x] Mild  [] Moderate  [] Severe [] Unable to Assess  [] Other:  Memory: [] WFL  [x] Mild  [] Moderate  [] Severe [] Unable to Assess  [] Other:  Behavior: [x] Alert  [x] Cooperative  []  Pleasant  [] Confused  [] Agitated  [] Uncooperative  [] Distractible [x] Motivated  [] Self-Limiting [] Anxious  [] Other:  Endurance:  [x] Adequate for participation in SLP sessions  [] Reduced overall  [] Lethargic  [] Other:  Safety: [x] No concerns at this time  [] Reduced insight into deficits  []  Reduced safety awareness [] Not following call light procedures  [] Unable to Assess  [] Other:  Swallowing Precautions: Sit up for all meals and thereafter for 30 minutes, Eat with small bites (1/2 tsp; 1 tsp), Alternate solids with liquids and Check mouth for food residue after snacks and meals, Chin Tuck w liquids, chin tuck and double swallow w solids  Barriers toward progress: Upper extremity weakness and Lower extremity weakness  Discharge recommendations:  [] Home independently  [x] Home with assistance []  24 hour supervision  [] ECF [] Other:  Continued Tx Upon Discharge: ? [x] Yes [] No [] TBD based on progress while on ARU [x] ? Vital Stim indicated [] Other:   Estimated discharge date: TBD     Initial Assessment 5/16/19  Cognitive Diagnosis: Minimal to mild cognitive-language impairments in the domains of working memory, higher level recall, EF/planning, and high level PS for complex functional tasks, with ongoing assessment. Speech Diagnosis: Mild dysarthria characterized by imprecision and minimally reduced breath support for volume and intelligibility 2/2 left sided OM weakness   Dysphagia Impression : Mild oropharyngeal dysphagia 2/2 left sided OM weakness and decreased tone resulting in decreased AP transit and concern for premature spillage, minimal oral residue. Pt reports increased labor and some fatigue at meals, but consistent intake with regular textures, and is able to order appropriate textures from select menu . Pt takes consecutive sips of thin liquid via straw with no overt s/s at bedside this date, but is reporting occasional coughing with liquids. Date: 5/24/2019 0868-3190     Tx session 1 Tx session 2   Total Timed Code Min 15    Total Treatment Minutes 45 n/a   Individual Treatment Minutes 45    Group Treatment Minutes 0 0   Co-Treat Minutes 0 0   Variance/Reason:  n/a    Pain denies    Pain Intervention [] RN notified  [] Repositioned  [] Intervention offered and patient declined  [x] N/A  [] Other: [] RN notified  [] Repositioned  [] Intervention offered and patient declined  [] N/A  [] Other:   Subjective     Seen in upright in bedside chair for tx, cooperative and motivated. Received approval from MD for use of VitalStim NMES with dysphagia tx. Pt agreeable to start this session. Objective:  Goals  Pt goal is to return home as independent as possible   ongoing      Short-term Goals  Timeframe for Short-term Goals: 2wks           Dysphagia Goals:  The patient will tolerate recommended diet without

## 2019-05-25 PROCEDURE — 97110 THERAPEUTIC EXERCISES: CPT

## 2019-05-25 PROCEDURE — 1280000000 HC REHAB R&B

## 2019-05-25 PROCEDURE — 97530 THERAPEUTIC ACTIVITIES: CPT

## 2019-05-25 PROCEDURE — 97112 NEUROMUSCULAR REEDUCATION: CPT

## 2019-05-25 PROCEDURE — 94760 N-INVAS EAR/PLS OXIMETRY 1: CPT

## 2019-05-25 PROCEDURE — 6370000000 HC RX 637 (ALT 250 FOR IP): Performed by: PHYSICAL MEDICINE & REHABILITATION

## 2019-05-25 PROCEDURE — 97535 SELF CARE MNGMENT TRAINING: CPT

## 2019-05-25 PROCEDURE — 97116 GAIT TRAINING THERAPY: CPT

## 2019-05-25 RX ADMIN — CLOPIDOGREL BISULFATE 75 MG: 75 TABLET, FILM COATED ORAL at 10:07

## 2019-05-25 RX ADMIN — ASPIRIN 81 MG 81 MG: 81 TABLET ORAL at 10:07

## 2019-05-25 RX ADMIN — ATORVASTATIN CALCIUM 80 MG: 80 TABLET, FILM COATED ORAL at 19:24

## 2019-05-25 RX ADMIN — METOPROLOL TARTRATE 25 MG: 25 TABLET ORAL at 19:24

## 2019-05-25 RX ADMIN — AMLODIPINE BESYLATE 10 MG: 10 TABLET ORAL at 10:06

## 2019-05-25 RX ADMIN — TRAZODONE HYDROCHLORIDE 100 MG: 100 TABLET ORAL at 20:44

## 2019-05-25 RX ADMIN — MULTIPLE VITAMINS W/ MINERALS TAB 1 TABLET: TAB at 10:07

## 2019-05-25 RX ADMIN — METOPROLOL TARTRATE 25 MG: 25 TABLET ORAL at 10:07

## 2019-05-25 ASSESSMENT — PAIN SCALES - GENERAL
PAINLEVEL_OUTOF10: 0

## 2019-05-25 NOTE — PROGRESS NOTES
functional mobility ; Decreased endurance;Decreased ROM; Decreased strength;Decreased safe awareness;Decreased balance;Decreased coordination;Decreased fine motor control;Decreased sensation  Assessment: Increased time to perform all tasks, tolerated quadruped position well for exercises and functional strengthening. Pt very motivated to return to PLOF. Pt does well to compensate with all mobility but has great potential for partial return if continued high level of therapy to promote return to higher level functional abilities. Pt very receptive to training this date and motivated to participate. Pt benefit from home PT to promote increased activation of L side body with functional mobility tasks, increased ability to complete transfers with decreased assistance, improve higher level balance, and improve ambulation to promote ability to return home. Treatment Diagnosis: Difficulty with ambulation/decrease strength secondary to L sided weakness   Prognosis: Good  Patient Education: promoting use of L side for all mobility tasks to promote return vs compensation, safety with all transfers including hand placement; neuro re education; quad position  REQUIRES PT FOLLOW UP: Yes  Activity Tolerance  Activity Tolerance: Patient Tolerated treatment well; Other;Patient limited by endurance  Activity Tolerance: compensates well but attempting to promote recovery        Goals  Short term goals  Time Frame for Short term goals: 1 week  Short term goal 1: sit <-> stand transfers CGA-met   Short term goal 2: ambulate 150 ft with LRAD CGA   Short term goal 3: ascend/descend 4 steps unilateral railing with assistance - met   Short term goal 4: ascend/descend curb step with LRAD and assistance.  - met   Long term goals  Time Frame for Long term goals : 2-3 weeks   Long term goal 1: sit <-> stand transfer mod I   Long term goal 2: ambulate with LRAD 200' mod I   Long term goal 3: ascend/descend 12 steps unilateral railing mod I

## 2019-05-25 NOTE — PROGRESS NOTES
dropped to floor, so held onto walker with L hand and reached with other to pull up, Hygiene seated.)  Additional Comments: Educated pt on need to hold onto stable surface such as grab bar when reaching down for items. Functional Mobility  Functional - Mobility Device: Rolling Walker  Activity: To/from bathroom; Other  Assist Level: Stand by assistance  Functional Mobility Comments: EOB > toilet > shower chair > sink in stance. Bathroom > beside recliner. All with RW and SBA. Occasional verbal cueing to slow down gait. Toilet Transfers  Toilet - Technique: Ambulating  Equipment Used: Grab bars(comfort ht)  Toilet Transfer: Stand by assistance  Toilet Transfers Comments: RW <> comfort height toilet with bilateral grab bars (as @ home). Verbal cues for hand placement. Shower Transfers  Shower - Transfer From: [employer])  Shower - Transfer Type: To and From  Shower - Transfer To: Shower seat with back  Shower - Technique: Ambulating  Shower Transfers: Stand by assistance  Shower Transfers Comments: RW <> shower chair with SBA, grab bars sit <> stand, verbal cues for hand placement. Wheelchair Bed Transfers  Wheelchair/Bed - Technique: Ambulating(RW)  Equipment Used: Bed;Wheelchair; Other(Bedside recliner)  Level of Asssistance: Stand by assistance  Wheelchair Transfers Comments: Stood from bed with RW, RW <> w/c, RW > bedside recliner. All with SBA and verbal cues for hand placement  Bed mobility  Supine to Sit: Modified independent                                    Second Part  of Session:      Instrumental ADL's  Instrumental ADLs: Yes  Light Housekeeping  Light Housekeeping Level: Other(Seated)  Light Housekeeping Level of Assistance: Supervision  Light Housekeeping: Practiced folding laundry seated in bedside recliner. Pt able to fold socks, shirts, and pants with increased time. Used LUE to stabilize and grasp items.          Type of ROM/Therapeutic Exercise  Type of ROM/Therapeutic Exercise: (Holding full Goals   Patient goals : I want to \"get my limbs back\" to be able to work again as , drive again, and be intimate with my girl friend. Therapy Time   Individual Concurrent Group Co-treatment   Time In 9165         Time Out 1215         Minutes 90         Timed Code Treatment Minutes: [id number] [facility] OT/S    Therapist was present, directed patients care, made skilled judgement, and was responsible for assessment and treatment of the patient.       Margo Zavala OTR/L #[id number]

## 2019-05-26 PROCEDURE — 6370000000 HC RX 637 (ALT 250 FOR IP): Performed by: PHYSICAL MEDICINE & REHABILITATION

## 2019-05-26 PROCEDURE — 1280000000 HC REHAB R&B

## 2019-05-26 PROCEDURE — 94760 N-INVAS EAR/PLS OXIMETRY 1: CPT

## 2019-05-26 RX ADMIN — ASPIRIN 81 MG 81 MG: 81 TABLET ORAL at 08:15

## 2019-05-26 RX ADMIN — METOPROLOL TARTRATE 25 MG: 25 TABLET ORAL at 08:15

## 2019-05-26 RX ADMIN — MULTIPLE VITAMINS W/ MINERALS TAB 1 TABLET: TAB at 08:15

## 2019-05-26 RX ADMIN — ATORVASTATIN CALCIUM 80 MG: 80 TABLET, FILM COATED ORAL at 20:28

## 2019-05-26 RX ADMIN — AMLODIPINE BESYLATE 10 MG: 10 TABLET ORAL at 08:15

## 2019-05-26 RX ADMIN — METOPROLOL TARTRATE 25 MG: 25 TABLET ORAL at 20:28

## 2019-05-26 RX ADMIN — TRAZODONE HYDROCHLORIDE 100 MG: 100 TABLET ORAL at 20:28

## 2019-05-26 RX ADMIN — CLOPIDOGREL BISULFATE 75 MG: 75 TABLET, FILM COATED ORAL at 08:15

## 2019-05-26 RX ADMIN — TRAMADOL HYDROCHLORIDE 100 MG: 50 TABLET, FILM COATED ORAL at 20:32

## 2019-05-26 ASSESSMENT — PAIN DESCRIPTION - PAIN TYPE: TYPE: CHRONIC PAIN

## 2019-05-26 ASSESSMENT — PAIN SCALES - GENERAL
PAINLEVEL_OUTOF10: 0
PAINLEVEL_OUTOF10: 7
PAINLEVEL_OUTOF10: 0
PAINLEVEL_OUTOF10: 0

## 2019-05-26 ASSESSMENT — PAIN DESCRIPTION - ORIENTATION: ORIENTATION: LEFT

## 2019-05-26 ASSESSMENT — PAIN DESCRIPTION - DESCRIPTORS: DESCRIPTORS: ACHING

## 2019-05-26 ASSESSMENT — PAIN - FUNCTIONAL ASSESSMENT: PAIN_FUNCTIONAL_ASSESSMENT: ACTIVITIES ARE NOT PREVENTED

## 2019-05-26 ASSESSMENT — PAIN DESCRIPTION - PROGRESSION: CLINICAL_PROGRESSION: GRADUALLY IMPROVING

## 2019-05-26 ASSESSMENT — PAIN DESCRIPTION - LOCATION: LOCATION: BACK

## 2019-05-26 ASSESSMENT — PAIN DESCRIPTION - FREQUENCY: FREQUENCY: CONTINUOUS

## 2019-05-26 ASSESSMENT — PAIN DESCRIPTION - ONSET: ONSET: ON-GOING

## 2019-05-26 NOTE — PLAN OF CARE
Problem: Risk for Impaired Skin Integrity  Goal: Tissue integrity - skin and mucous membranes  Description  Structural intactness and normal physiological function of skin and  mucous membranes. 5/25/2019 2135 by Dania Noriega RN  Outcome: Ongoing  Note:   Patient is at risk for impaired skin integrity. Pt is able to turn and reposition self as needed. Skin is assessed every shift and prn. Patient has pillow support, to help reduce skin breakdown. Will continue to monitor. Problem: Falls - Risk of:  Goal: Will remain free from falls  Description  Will remain free from falls  5/25/2019 2135 by Dania Noriega RN  Outcome: Ongoing  Note:   Patient is a high fall risk. Patient free of falls this shift. Bed low, locked and alarmed at all times. Call light and bedside table is within reach. Arm band on wrist, fall light on outside of room, and nonskid socks are on patient. Notified patient to ask for assistance when needed. Patient verbalized understanding. Problem: ACTIVITY INTOLERANCE/IMPAIRED MOBILITY  Goal: Mobility/activity is maintained at optimum level for patient  5/25/2019 2135 by Dania Noriega RN  Outcome: Ongoing  Note:   Patient able to ambulate without shortness of breath or pain. Patient ambulates with walker to bathroom as needed. Patient is on room air. Will continue to monitor and assess. Problem: Pain:  Goal: Control of acute pain  Description  Control of acute pain  5/25/2019 2135 by Dania Noriega RN  Outcome: Ongoing  Note:   Patient denies any pain at this time. Will continue to monitor.

## 2019-05-27 LAB
ANION GAP SERPL CALCULATED.3IONS-SCNC: 10 MMOL/L (ref 3–16)
BASOPHILS ABSOLUTE: 0 K/UL (ref 0–0.2)
BASOPHILS RELATIVE PERCENT: 0.8 %
BUN BLDV-MCNC: 12 MG/DL (ref 7–20)
CALCIUM SERPL-MCNC: 8.8 MG/DL (ref 8.3–10.6)
CHLORIDE BLD-SCNC: 103 MMOL/L (ref 99–110)
CO2: 27 MMOL/L (ref 21–32)
CREAT SERPL-MCNC: 0.7 MG/DL (ref 0.8–1.3)
EOSINOPHILS ABSOLUTE: 0.2 K/UL (ref 0–0.6)
EOSINOPHILS RELATIVE PERCENT: 4 %
GFR AFRICAN AMERICAN: >60
GFR NON-AFRICAN AMERICAN: >60
GLUCOSE BLD-MCNC: 96 MG/DL (ref 70–99)
HCT VFR BLD CALC: 40.6 % (ref 40.5–52.5)
HEMOGLOBIN: 13.6 G/DL (ref 13.5–17.5)
LYMPHOCYTES ABSOLUTE: 1.4 K/UL (ref 1–5.1)
LYMPHOCYTES RELATIVE PERCENT: 36.5 %
MCH RBC QN AUTO: 29 PG (ref 26–34)
MCHC RBC AUTO-ENTMCNC: 33.4 G/DL (ref 31–36)
MCV RBC AUTO: 86.8 FL (ref 80–100)
MONOCYTES ABSOLUTE: 0.4 K/UL (ref 0–1.3)
MONOCYTES RELATIVE PERCENT: 11.3 %
NEUTROPHILS ABSOLUTE: 1.8 K/UL (ref 1.7–7.7)
NEUTROPHILS RELATIVE PERCENT: 47.4 %
PDW BLD-RTO: 13.7 % (ref 12.4–15.4)
PLATELET # BLD: 220 K/UL (ref 135–450)
PMV BLD AUTO: 6.9 FL (ref 5–10.5)
POTASSIUM REFLEX MAGNESIUM: 4.2 MMOL/L (ref 3.5–5.1)
RBC # BLD: 4.68 M/UL (ref 4.2–5.9)
SODIUM BLD-SCNC: 140 MMOL/L (ref 136–145)
WBC # BLD: 3.9 K/UL (ref 4–11)

## 2019-05-27 PROCEDURE — 6370000000 HC RX 637 (ALT 250 FOR IP): Performed by: PHYSICAL MEDICINE & REHABILITATION

## 2019-05-27 PROCEDURE — 36415 COLL VENOUS BLD VENIPUNCTURE: CPT

## 2019-05-27 PROCEDURE — 85025 COMPLETE CBC W/AUTO DIFF WBC: CPT

## 2019-05-27 PROCEDURE — 94760 N-INVAS EAR/PLS OXIMETRY 1: CPT

## 2019-05-27 PROCEDURE — 80048 BASIC METABOLIC PNL TOTAL CA: CPT

## 2019-05-27 PROCEDURE — 1280000000 HC REHAB R&B

## 2019-05-27 RX ADMIN — TRAMADOL HYDROCHLORIDE 50 MG: 50 TABLET, FILM COATED ORAL at 21:52

## 2019-05-27 RX ADMIN — SENNOSIDES,DOCUSATE SODIUM 2 TABLET: 8.6; 5 TABLET, FILM COATED ORAL at 21:52

## 2019-05-27 RX ADMIN — CLOPIDOGREL BISULFATE 75 MG: 75 TABLET, FILM COATED ORAL at 08:12

## 2019-05-27 RX ADMIN — ASPIRIN 81 MG 81 MG: 81 TABLET ORAL at 08:11

## 2019-05-27 RX ADMIN — AMLODIPINE BESYLATE 10 MG: 10 TABLET ORAL at 08:12

## 2019-05-27 RX ADMIN — METOPROLOL TARTRATE 25 MG: 25 TABLET ORAL at 21:52

## 2019-05-27 RX ADMIN — TRAZODONE HYDROCHLORIDE 100 MG: 100 TABLET ORAL at 21:52

## 2019-05-27 RX ADMIN — ATORVASTATIN CALCIUM 80 MG: 80 TABLET, FILM COATED ORAL at 21:52

## 2019-05-27 RX ADMIN — METOPROLOL TARTRATE 25 MG: 25 TABLET ORAL at 08:12

## 2019-05-27 RX ADMIN — MULTIPLE VITAMINS W/ MINERALS TAB 1 TABLET: TAB at 08:11

## 2019-05-27 ASSESSMENT — PAIN SCALES - GENERAL
PAINLEVEL_OUTOF10: 0
PAINLEVEL_OUTOF10: 0
PAINLEVEL_OUTOF10: 6
PAINLEVEL_OUTOF10: 6
PAINLEVEL_OUTOF10: 0
PAINLEVEL_OUTOF10: 0
PAINLEVEL_OUTOF10: 2

## 2019-05-27 ASSESSMENT — PAIN DESCRIPTION - FREQUENCY: FREQUENCY: INTERMITTENT

## 2019-05-27 ASSESSMENT — PAIN DESCRIPTION - ONSET: ONSET: ON-GOING

## 2019-05-27 ASSESSMENT — PAIN DESCRIPTION - PROGRESSION: CLINICAL_PROGRESSION: GRADUALLY IMPROVING

## 2019-05-27 ASSESSMENT — PAIN DESCRIPTION - ORIENTATION: ORIENTATION: LEFT

## 2019-05-27 ASSESSMENT — PAIN - FUNCTIONAL ASSESSMENT: PAIN_FUNCTIONAL_ASSESSMENT: ACTIVITIES ARE NOT PREVENTED

## 2019-05-27 ASSESSMENT — PAIN DESCRIPTION - PAIN TYPE: TYPE: CHRONIC PAIN

## 2019-05-27 ASSESSMENT — PAIN DESCRIPTION - DESCRIPTORS: DESCRIPTORS: ACHING

## 2019-05-27 ASSESSMENT — PAIN DESCRIPTION - LOCATION: LOCATION: BACK

## 2019-05-27 NOTE — PLAN OF CARE
Problem: Risk for Impaired Skin Integrity  Goal: Tissue integrity - skin and mucous membranes  Description  Structural intactness and normal physiological function of skin and  mucous membranes. 5/27/2019 1507 by Dariusz Rodriguez RN  Outcome: Ongoing  Note:   Repositioning every 2 hours when in bed or chair. 5/27/2019 0246 by Franco Ricardo RN  Outcome: Ongoing     Problem: Falls - Risk of:  Goal: Will remain free from falls  Description  Will remain free from falls  5/27/2019 1507 by Dariusz Rodriguez RN  Outcome: Ongoing  Note:   Patient free of falls this shift, all safety precautions in place. 5/27/2019 0246 by Franco Ricardo RN  Outcome: Ongoing  Goal: Absence of physical injury  Description  Absence of physical injury  5/27/2019 1507 by Dariusz Rodriguez RN  Outcome: Ongoing  Note:   All safety precautions in place including nonskid socks on feet, bed exit alarm on and posey clip attached to patient when in chair, phones and call light in reach, will continue to monitor. 5/27/2019 0246 by Franco Ricardo RN  Outcome: Ongoing     Problem: Pain:  Goal: Control of acute pain  Description  Control of acute pain  5/27/2019 1507 by Dariusz Rodriguez RN  Outcome: Ongoing  Note:   Patient uses pain scale appropriately. 5/27/2019 0246 by Franco Ricardo RN  Outcome: Ongoing     Problem: Neurological  Goal: Maximum potential motor/sensory/cognitive function  5/27/2019 1507 by Dariusz Rodriguez RN  Outcome: Ongoing  Note:   Up in chair for all meals, returned verbal acknowledgement of all safety precautions in place, will continue to monitor.    5/27/2019 0246 by Franco Ricardo RN  Outcome: Ongoing     Problem: Tobacco Use:  Goal: Inpatient tobacco use cessation counseling participation  Description  Inpatient tobacco use cessation counseling participation  5/27/2019 0246 by Franco Ricardo RN  Outcome: Ongoing     Problem: ACTIVITY INTOLERANCE/IMPAIRED MOBILITY  Goal: Mobility/activity is maintained at optimum level for patient  5/27/2019 1507 by Gwen Deutsch RN  Outcome: Ongoing  Note:   Participates in all scheduled therapies, is cooperative and has a positive attitude towards achieving set goals.     5/27/2019 0246 by Francine Dougherty RN  Outcome: Ongoing     Problem: COMMUNICATION IMPAIRMENT  Goal: Ability to express needs and understand communication  5/27/2019 1507 by Gwen Deutsch RN  Outcome: Ongoing  Note:   Encouragement of the use of communication board   5/27/2019 0246 by Francine Dougherty RN  Outcome: Ongoing

## 2019-05-28 PROCEDURE — 94760 N-INVAS EAR/PLS OXIMETRY 1: CPT

## 2019-05-28 PROCEDURE — 97535 SELF CARE MNGMENT TRAINING: CPT

## 2019-05-28 PROCEDURE — 6370000000 HC RX 637 (ALT 250 FOR IP): Performed by: PHYSICAL MEDICINE & REHABILITATION

## 2019-05-28 PROCEDURE — 97127 HC SP THER IVNTJ W/FOCUS COG FUNCJ: CPT

## 2019-05-28 PROCEDURE — 92507 TX SP LANG VOICE COMM INDIV: CPT

## 2019-05-28 PROCEDURE — 92526 ORAL FUNCTION THERAPY: CPT

## 2019-05-28 PROCEDURE — 97530 THERAPEUTIC ACTIVITIES: CPT

## 2019-05-28 PROCEDURE — 1280000000 HC REHAB R&B

## 2019-05-28 PROCEDURE — 97110 THERAPEUTIC EXERCISES: CPT

## 2019-05-28 PROCEDURE — 97116 GAIT TRAINING THERAPY: CPT

## 2019-05-28 PROCEDURE — 97112 NEUROMUSCULAR REEDUCATION: CPT

## 2019-05-28 RX ADMIN — AMLODIPINE BESYLATE 10 MG: 10 TABLET ORAL at 08:43

## 2019-05-28 RX ADMIN — TRAZODONE HYDROCHLORIDE 100 MG: 100 TABLET ORAL at 22:30

## 2019-05-28 RX ADMIN — ASPIRIN 81 MG 81 MG: 81 TABLET ORAL at 08:43

## 2019-05-28 RX ADMIN — CLOPIDOGREL BISULFATE 75 MG: 75 TABLET, FILM COATED ORAL at 08:43

## 2019-05-28 RX ADMIN — SENNOSIDES,DOCUSATE SODIUM 2 TABLET: 8.6; 5 TABLET, FILM COATED ORAL at 08:43

## 2019-05-28 RX ADMIN — METOPROLOL TARTRATE 25 MG: 25 TABLET ORAL at 22:22

## 2019-05-28 RX ADMIN — MULTIPLE VITAMINS W/ MINERALS TAB 1 TABLET: TAB at 08:43

## 2019-05-28 RX ADMIN — SENNOSIDES,DOCUSATE SODIUM 2 TABLET: 8.6; 5 TABLET, FILM COATED ORAL at 22:23

## 2019-05-28 RX ADMIN — METOPROLOL TARTRATE 25 MG: 25 TABLET ORAL at 08:43

## 2019-05-28 RX ADMIN — ATORVASTATIN CALCIUM 80 MG: 80 TABLET, FILM COATED ORAL at 22:22

## 2019-05-28 ASSESSMENT — PAIN SCALES - GENERAL
PAINLEVEL_OUTOF10: 0

## 2019-05-28 NOTE — PROGRESS NOTES
Training, Patient/Caregiver Education & Training, Home Management Training, ROM, Endurance Training, Equipment Evaluation, Education, & procurement, Self-Care / ADL, Safety Education & Training, Neuromuscular Re-education, Balance Training  Plan Comment: Monday Conference          Goals  Short term goals  Time Frame for Short term goals: 1 week   Short term goal 1: Pt will perform bathing with min A and shower chair. MET   Short term goal 2: Pt will perform upper body dressing SBA and lower body dressing min A except Eric Hose. MET  Short term goal 3: Pt will perform toileting with CGA and grab bars PRN. NT as always declines toileting with OT  Short term goal 4: Pt will perform functional mobility / transfers with CGA and LRAD. MET  Short term goal 5: Pt will improve LUE AROM-HEP / strength / coordination through there ex / neuro re-ed to actively use for bilateral ADL tasks 25% of time. MET  Long term goals  Time Frame for Long term goals : 2-3 weeks   Long term goal 1: Pt will perform bathing with mod ind and shower chair   Long term goal 2: Pt will perform dressing with mod ind  Long term goal 3: Pt will perform toileting with mod ind  Long term goal 4: Pt will perform functional mobility / transfers with mod ind and LRAD   Long term goal 5: Pt will improve LUE AROM-HEP / strength / coordination through there ex / neuro re-ed to actively use for bilateral ADL tasks 50% of time. Long term goals 6: Pt will improve balance, L side awareness and activity tolerance to perform object retrieval / transport for simple IADL tasks with supervision. Patient Goals   Patient goals : I want to \"get my limbs back\" to be able to work again as , drive again, and be intimate with my girl friend.         Therapy Time   Individual Concurrent Group Co-treatment   Time In 0715         Time Out 0820         Minutes 65         Timed Code Treatment Minutes: 65 Minutes   Therapy Time     Individual Co-treatment   Time In  Time Out [id number]     Minutes 36          Consuelo Gibbs/OT  Therapist was present, directed patients care, made skilled judgement, and was responsible for assessment and treatment of the patient.       Liset Stuart, OTR/L #[id number]

## 2019-05-28 NOTE — PATIENT CARE CONFERENCE
[facility]  Inpatient Rehabilitation  Weekly Team Conference Note      Date: 2019  Patient Name:  Kasey Worrell    MRN: [id number]  : 1955  Gender: male  Physician: Dr Digna Ni  Diagnosis: Right-sided cerebrovascular accident (CVA) [facility]) [I63.9]    CASE MANAGEMENT  Assessment:   Goal is home.       PHYSICAL THERAPY    Bed mobility  Bridging: Unable to assess  Rolling to Left: Modified independent  Rolling to Right: Modified independent  Supine to Sit: Modified independent  Sit to Supine: Modified independent  Scooting: Modified independent  Comment: HOB flat, no railing; prone <-> quadruped with overall increased ability to perform supervision with cues for safety     Transfers:  Sit to Stand: Supervision(VC for use of BUE for safety/proper sequencing )  Stand to sit: Supervision(VC for hand placement and safety with eccentric descent )  Bed to Chair: Unable to assess  Stand Pivot Transfers: Contact guard assistance(with walker, to patient's right to bed from W/C)  Comment: VC for hand placement and LLE placement to promote proper use left side     Ambulation 1  Surface: level tile  Device: Rolling Walker  Other Apparatus: AFO, Dorsiflex Assist, Left  Assistance: Supervision  Quality of Gait: improvement with advancement of RLE but with fatigue demonstrating flexed trunk posture then causing decreased clearance requires VC for proper sequencing, genurecurvatum on left in mid to termianl stance phase, patient able to reduce recurvatum with cues for left gluteal contraction with L foot initial contact  Gait Deviations: Slow Ruby, Decreased step height, Deviated path  Distance: 230' X 1, 350' X 1, 200'X 1, short distances in therapy gym   Comments: pt very responsive to VC for proper sequencing     Stairs  # Steps : 12  Stairs Height: 6\"  Rails: Right ascending, Bilateral(first 4 with right railing and LUE on folded walker)  Curbs: 6\"(X 1 trial with RW SBA with VC for safety, aid or glasses- if patient is primarily a visual learner)  Expression: 6 - Device used to express complex ideas/needs  Social Interaction: 6 - Patient requires medication for mood and/or effect  Problem Solvin - Patient able to solve simple/routine tasks  Memory: 5 - Patient requires prompting with stress/unfamiliar situations    Assessment: Pt is making continued progress in oral motor rom/coordination/strength with improved oral motor speech intelligibility and articulatory precision. Pt with improving oral motor coordination with improve left o-m rom/coordination for mastication with significant decrease in episodes of anterior loss before/during /after the swallow and significant decrease in oral pocketing of food consistencies. Pt with good effort in dysphagia therapy. Pt is demonstrating improved recall of new learning strategies in structured sessions. Pt with residual cognitive-linguistic deficits which impact independence in higher level tasks requiring executive function. . Will continued therapy while acute rehab unit. Anticipate need for further therapy at d/c. Joan Tovar,MS,CCC,SLP [id number]  Speech and Language Pathologist          OCCUPATIONAL THERAPY    ADL  Feeding: Setup(Opened containers )  Grooming: Stand by assistance(Washed face / hands seated in shower chair. Stance to shave and to brush teeth / hair at sink holding onto counter. Assist to open shaving cream bottle as hands were soapy.  )  UE Bathing: Setup, Supervision, Stand by assistance(Seated in shower chair with supervision. Hand held shower head. Stance to wash genitals with grab bars (as @ home) and SBA. )  LE Bathing: Setup, Supervision, Stand by assistance(Seated in shower chair with supervision. Stance for hiram-anal care with grab bars (as @ home) and SBA)  UE Dressing: Supervision(Seated.  Donned L affected side first without verbal cueing)  LE Dressing: Stand by assistance(Seated donned underwear and pants, stance to pull up and Grab bars in shower. Shower also has built in seat. Has comfort ht toilet & grab bar. UE Function: AROM WFL. L shoulder strength improved to 4-/5. L  improved from 30# on eval > 54# on 19. Pt was unable to perform nine hole peg test on L hand on eval, but was able to complete in 111 sec on 19. FIMS:  Eatin - Feeds self with adaptive equipment/dentures and/or feeds self with modified diet and/or performs own tube feeding  Groomin - Requires setup/cues to do all tasks  Bathin - Able to bathe all 10 areas with setup/sup/cues  Dressing-Upper: 5 - Requires setup/supervision/cues and/or requires assist with presthesis/brace only  Dressing-Lower: 5 - Requires setup/supervision/cues and/or staff applies TEDS/prosthesis/brace only  Toiletin - Requires steadying assistance only  Toilet Transfer: 5 - Requires setup/supervision/cues  Tub Transfer: 4 - Minimal contact assistance, pt. expends 75% or more effort  Shower Transfer: 5 - Supervision, set-up, cues         Assessment: Pt completed ADL shower session SBA for all tasks including donning allie hose with decreased time & effort. Pt also able to put on deodorant with LUE with increased ease. Pt is motivated & reported he spent a lot of the day yesterday doing his ex/HEP on his own. Continue tx per POC to address ADL/IADL retraining, functional mobility / transfers, LUE strength/coordination. Plan is d/c Sat 19 to apt with girlfriend & home OT. NUTRITION  Most recent weightWeight: 153 lb 3.5 oz (69.5 kg)  BSA (Calculated - sq m): 1.89 sq meters  BMI (Calculated): 20.8  Please see nutrition note for details. NURSING  Bladder 5, Bowel 6, monitor and maintain skin integrity, loop recorder site.   Family Education: Patient education: CVA, medications, swallowing precautions, safety/fall prevention, skin/incision care, (AFO), Loop recorder education, pain control as needed, smoking cessation, alcohol use related to CVA

## 2019-05-28 NOTE — PROGRESS NOTES
left      -lingual protrusion : warm up completed and appeared fairly symmetrical  -lateral lingual movement: moderate rom bilaterally warm up  -lingual elevation (anterior/posterior): coordination ex: fair to good  -lingual agility and control (licking lips in [place]) : clockwise and alternating counter clockwise: good rom and labial contact. -BOT elevation: /g/ words: x 10    TBR :   -alternating tongue protrusion with retraction for 'ugu'     n/a   The patient will improve oral preparation phase via bolus control/manipulation exercises to 5/5 each trial. Targeted during po trials: -emphasizing lingual shaping for lingual hold  -bolus formation and manipulation to mid tongue  -mastication left side  -lingual sweep to clear any oral pocketing  -labial seal n/a   The patient/caregiver will demonstrate understanding of compensatory strategies for improved swallowing safety. ongoing review and recall with (I) to intermittent cues n/a   NEW Modified GOAL 5/24: Pt will improve rate of swallow initiation via thermal gustatory stimulation; NMES vital stimulation        NMES initiated position 4A, pt tolerated well for  10.5 mA and 2.0 mA 35 min during completion of labial, buccal, lingual and laryngeal pharyngeal exs ; and during po presentations    The patient will improve pharyngeal phase via pharyngolaryngeal exercises 5/5 each for increased swallow safety with PO diet.  (Discontinue 5/22)  UPDATED GOAL 5/22: Pt will demonstrate improved laryngeal elevation via laryngeal ex 15/15 reps       Jennifer: improved with each trial regarding rate of achieving    Glottals on expiratory effort: >5    BOT with pitch variation low to high with falsetto hold for 2-3 seconds:  X 15 reps n/a   Speech/Cognitive goals       Goal 1: The patient will improve speech intelligibility to >90% in conversation via compensatory strategy use and oral motor/vocal strengthening.   -less structured conversational exchange: 100% intelligible and

## 2019-05-28 NOTE — PROGRESS NOTES
treatment session.    Pain Screening  Patient Currently in Pain: Denies  Pain Assessment  Pain Assessment: 0-10  Pain Level: 0  Patient's Stated Pain Goal: No pain  Vital Signs  Patient Currently in Pain: Denies       Orientation  Orientation  Overall Orientation Status: Within Normal Limits  Orientation Level: Oriented X4  Cognition      Objective   Bed mobility  Rolling to Left: Modified independent  Rolling to Right: Modified independent  Supine to Sit: Modified independent  Sit to Supine: Modified independent  Scooting: Modified independent  Comment: HOB flat, no railing; prone <-> quadruped with overall increased ability to perform supervision with cues for safety   Transfers  Sit to Stand: Supervision(VC for use of BUE for safety/proper sequencing )  Stand to sit: Supervision(VC for hand placement and safety with eccentric descent )  Ambulation  Ambulation?: Yes  Ambulation 1  Surface: level tile  Device: Rolling Walker  Other Apparatus: AFO;Dorsiflex Assist;Left  Assistance: Supervision  Quality of Gait: improvement with advancement of RLE but with fatigue demonstrating flexed trunk posture then causing decreased clearance requires VC for proper sequencing, genurecurvatum on left in mid to termianl stance phase, patient able to reduce recurvatum with cues for left gluteal contraction with L foot initial contact  Gait Deviations: Slow Ruby;Decreased step height;Deviated path  Distance: 230' X 1, 350' X 1, 200'X 1, short distances in therapy gym   Comments: pt very responsive to VC for proper sequencing   Stairs/Curb  Stairs?: Yes  Stairs  # Steps : 12  Stairs Height: 6\"  Rails: Right ascending;Bilateral(first 4 with right railing and LUE on folded walker)  Curbs: 6\"(X 1 trial with RW SBA with VC for safety, increased time to perform )  Device: No Device;Rolling walker(folded RW (pt able to fold with VC and supervision for balance) first 4 steps)  Other Apparatus: AFO  Assistance: Stand by assistance  Comment: up/down 12 steps with iram hand rail and non-reciprocal pattern, cues to lead up with R LE and down with L LE; increased time to advance LLE with cues throughout;     Balance  Posture: Good  Sitting - Static: Good  Sitting - Dynamic: Good  Standing - Static: Good  Standing - Dynamic: Good  Comments: RW for ambulation   Exercises  Neurodevelopmental Techniques: Quadruped exercises with CGA X 10 lifting RUE, X 10 lifting LUE, and X 3 kicking LLE out/back in (CGA-min assist for balance). VC and TC for strengthening of LUE throughout. Cat/camel is quad position X 10 each direction CGA. Bilateral kneeling with red swiss ball pushing ball out forward/backward with trunk erect (movement at knee) X 7, lifting BUE off swiss ball in bilateral kneeling X 10 each increased time, and bilateral kneeling ambulating on knees lateral with CGA BUE supported on swiss X 4 steps each direction. Bilateral kneeing to sitting back at heel bending knee X 7 with increased fatigue SBA. Pt tolerated functional positioning exercises well without complaints. Pt continues to demonstrate interest in return to sexual activities with girlfriend. Pt provided with packet information from back injury booklet on sex after injuries including assistance/education on position with stability exercises to perform including hip stretching and quadruped positioning. PM session:   S: Pt without complaints this afternoon, denies pain. Pt eager for participation with PT this afternoon. O: Sit <-> stand multiple time/surfaces with supervision including VC to include left side of body. Pt ambulated with ' X 2 trials with RW supervision, overall increased clearance LLE with occasional VC to promote erect posture for safe clearance.  NuStep 12 minutes, seat 11, BUE 11, level 5 resistance, average SPM: 73. Pt ambulated back to room ~ 160' at completion of session with RW SBA, increased time due to fatigue with two quick pauses. Sitting EOB to supine mod I at completion of session. A: Pt continues to demonstrate overall increased tolerance and ability to complete all mobility tasks with decreased assistance overall. Increased safety awareness with ambulation/transfers. Safety Device - Type of devices:  [x]  All fall risk precautions in place [x] Bed alarm in place  [x] Call light within reach [] Chair alarm in place [] Positioning belt [x] Gait belt [x] Patient at risk for falls [x] Left in bed [] Left in chair [] Telesitter in use [] Sitter present [] Nurse notified []  None     Assessment   Body structures, Functions, Activity limitations: Decreased functional mobility ; Decreased endurance;Decreased ROM; Decreased strength;Decreased safe awareness;Decreased balance;Decreased coordination;Decreased fine motor control;Decreased sensation  Assessment: Pt demonstrated overall increased ability to use LLE with all functional mobility tasks this date demonstrating increased return with hip and knee during ambulation tasks. Improved balance and safety awareness overall. Pt very motivated to return to PLOF. Pt does well to compensate with all mobility but has great potential for partial return if continued high level of therapy to promote return to higher level functional abilities. Pt very receptive to training this date and motivated to participate. Pt benefit from home PT to promote increased activation of L side body with functional mobility tasks, increased ability to complete transfers with decreased assistance, improve higher level balance, and improve ambulation to promote ability to return home.  Plan for discharge home   Treatment Diagnosis: Difficulty with ambulation/decrease strength secondary to L sided weakness   Prognosis: Good  Patient Education: promoting use of L side for all mobility tasks to promote return vs compensation, bed mobility with functional exercises   REQUIRES PT FOLLOW UP: Yes  Activity

## 2019-05-29 PROCEDURE — 97112 NEUROMUSCULAR REEDUCATION: CPT

## 2019-05-29 PROCEDURE — 92526 ORAL FUNCTION THERAPY: CPT

## 2019-05-29 PROCEDURE — 97116 GAIT TRAINING THERAPY: CPT

## 2019-05-29 PROCEDURE — 97530 THERAPEUTIC ACTIVITIES: CPT

## 2019-05-29 PROCEDURE — 97535 SELF CARE MNGMENT TRAINING: CPT

## 2019-05-29 PROCEDURE — 92507 TX SP LANG VOICE COMM INDIV: CPT

## 2019-05-29 PROCEDURE — 97110 THERAPEUTIC EXERCISES: CPT

## 2019-05-29 PROCEDURE — 6370000000 HC RX 637 (ALT 250 FOR IP): Performed by: PHYSICAL MEDICINE & REHABILITATION

## 2019-05-29 PROCEDURE — 94760 N-INVAS EAR/PLS OXIMETRY 1: CPT

## 2019-05-29 PROCEDURE — 1280000000 HC REHAB R&B

## 2019-05-29 RX ADMIN — TRAZODONE HYDROCHLORIDE 100 MG: 100 TABLET ORAL at 21:17

## 2019-05-29 RX ADMIN — AMLODIPINE BESYLATE 10 MG: 10 TABLET ORAL at 08:23

## 2019-05-29 RX ADMIN — METOPROLOL TARTRATE 25 MG: 25 TABLET ORAL at 21:17

## 2019-05-29 RX ADMIN — ATORVASTATIN CALCIUM 80 MG: 80 TABLET, FILM COATED ORAL at 21:17

## 2019-05-29 RX ADMIN — MULTIPLE VITAMINS W/ MINERALS TAB 1 TABLET: TAB at 08:23

## 2019-05-29 RX ADMIN — SENNOSIDES,DOCUSATE SODIUM 2 TABLET: 8.6; 5 TABLET, FILM COATED ORAL at 08:23

## 2019-05-29 RX ADMIN — METOPROLOL TARTRATE 25 MG: 25 TABLET ORAL at 08:23

## 2019-05-29 RX ADMIN — ASPIRIN 81 MG 81 MG: 81 TABLET ORAL at 08:23

## 2019-05-29 RX ADMIN — CLOPIDOGREL BISULFATE 75 MG: 75 TABLET, FILM COATED ORAL at 08:23

## 2019-05-29 ASSESSMENT — PAIN SCALES - GENERAL
PAINLEVEL_OUTOF10: 0
PAINLEVEL_OUTOF10: 0

## 2019-05-29 NOTE — PROGRESS NOTES
Physical Therapy  Facility/Department: [address] REHAB  Daily Treatment Note  NAME: Dilip Schroeder  : 1955  MRN: [id number]    Date of Service: 2019    Discharge Recommendations:  Home with assist PRN, Home with Home health PT, S Level 1   PT Equipment Recommendations  Equipment Needed: Yes  Other: will continue to assess--> RW vs cane pending progression    Patient Diagnosis(es): There were no encounter diagnoses. has a past medical history of Cerebral artery occlusion with cerebral infarction ([facility] [address] has a past surgical history that includes Wrist fracture surgery (Left, ). Restrictions  Restrictions/Precautions  Restrictions/Precautions: Fall Risk  Position Activity Restriction  Other position/activity restrictions: cardiac diet   Subjective   General  Chart Reviewed: Yes  Additional Pertinent Hx: \"66year-old male with sudden onset of left arm, leg, and facial weakness and numbness on  despite taking Eliquis. Mary Coreas He came into the hospital head CT scan showed infarction right basal ganglia area. He does have a history of sickle cell trait. Patient seen by neurology, and switch his medications to aspirin and Plavix. Patient also seen by hematology, and blood work for hypercoagulable state is in progress. MRI results noted acute right basal ganglia infarct. Echo negative for thrombus or shunting. Patient started therapies, but needs more therapy before discharged to home safely due to his left-sided weakness. . \" (Dr. Jinny Meeks 5/15/19) Pt reports initial stroke 19 this date returned to home but states slowly decreased in functional mobility. Family / Caregiver Present: No  Referring Practitioner: Dr. Jimenez/Dr. Riaz George   Subjective  Subjective: Pt agreeable to therapy. Pt finishing up putting on ADDIE hose. Reports mild discomfort in R shoulder.           Objective      Transfers  Sit to Stand: Supervision  Stand to sit: Supervision  Ambulation  Ambulation?: Yes  Ambulation 1  Surface: unilateral railing mod I   Long term goal 4: ascend/descend curb step with LRAD mod I  Patient Goals   Patient goals : \"get well, get finished with my house remodel and retire\"     Plan    Plan  Times per week: 5-6X   Times per day: Twice a day  Plan weeks: 2-3  Current Treatment Recommendations: Strengthening, Balance Training, Functional Mobility Training, Transfer Training, ROM, Endurance Training, Wheelchair Mobility Training, Neuromuscular Re-education, Stair training, ADL/Self-care Training, Gait Training, Home Exercise Program, Safety Education & Training, Positioning, Equipment Evaluation, Education, & procurement, Patient/Caregiver Education & Training  Safety Devices  Type of devices:  All fall risk precautions in place, Gait belt, Patient at risk for falls, Left in chair  Restraints  Initially in place: No     Therapy Time   Individual Concurrent Group Co-treatment   Time In 0945         Time Out 1030         Minutes 45         Timed Code Treatment Minutes: 14 [facility],   [facility]

## 2019-05-29 NOTE — PLAN OF CARE
Problem: Risk for Impaired Skin Integrity  Goal: Tissue integrity - skin and mucous membranes  Description  Structural intactness and normal physiological function of skin and  mucous membranes. 5/29/2019 1135 by Jackqulyn Holstein, RN  Outcome: Ongoing  Note:   Pt is at risk for impaired skin integrity. Assess skin every shift and prn. Turn every 2 hours. Keep heels off bed. Keep skin clean and dry. 5/29/2019 1055 by Jackqulyn Holstein, RN  Outcome: Ongoing  Note:   Pt is at risk for impaired skin integrity. Assess skin every shift and prn. Turn every 2 hours. Keep heels off bed. Keep skin clean and dry. Problem: Falls - Risk of:  Goal: Will remain free from falls  Description  Will remain free from falls  5/29/2019 1135 by Jackqulyn Holstein, RN  Outcome: Ongoing  Note:   Pt is at risk for falls. Call light in reach. Bed in low position. Alarm on. Nonskid footwear on. Possessions in reach. Transfers with a walker, gait belt and AFO to LLE.  5/29/2019 1056 by Jackqulyn Holstein, RN  Note:   Pt is at risk for falls. Call light in reach. Bed in low position. Alarm on. Nonskid footwear on. Possessions in reach. Transfers walker x1  5/29/2019 0333 by Catie Bell RN  Outcome: Ongoing     Problem: Pain:  Goal: Control of acute pain  Description  Control of acute pain  Outcome: Ongoing  Note:   Assess pts level of pain. Medicate as ordered for pain. Evaluate effectiveness of pain med given. Notify physician if pain not controlled. Alternative pain reliever rest and reposition.

## 2019-05-29 NOTE — CARE COORDINATION
Team Conference held today. Team reviewed progress and goals. Team reports he will need continued oversight and management of his medications due to cog deficits. DME recommendations for wh walker and shower chair. Team recommends DC to home on Saturday 6-1-2019. LSW met with patient to review. He is very pleased with this progress. He states his daughter will select the home care agency as she took the paperwork home. He does want her to be informed of progress. Call placed to [facility] to give this update. LSW left message asking for returned call and to inform of need for oversight on his medication management. Left message about his home care services and asked her to call back with the name of the agency they will choose.   [facility]     Case Management   [phone]    5/29/2019  2:23 PM

## 2019-05-30 LAB
ANION GAP SERPL CALCULATED.3IONS-SCNC: 8 MMOL/L (ref 3–16)
BASOPHILS ABSOLUTE: 0 K/UL (ref 0–0.2)
BASOPHILS RELATIVE PERCENT: 0.9 %
BUN BLDV-MCNC: 16 MG/DL (ref 7–20)
CALCIUM SERPL-MCNC: 9.1 MG/DL (ref 8.3–10.6)
CHLORIDE BLD-SCNC: 108 MMOL/L (ref 99–110)
CO2: 27 MMOL/L (ref 21–32)
CREAT SERPL-MCNC: 0.8 MG/DL (ref 0.8–1.3)
EOSINOPHILS ABSOLUTE: 0.2 K/UL (ref 0–0.6)
EOSINOPHILS RELATIVE PERCENT: 4.5 %
GFR AFRICAN AMERICAN: >60
GFR NON-AFRICAN AMERICAN: >60
GLUCOSE BLD-MCNC: 103 MG/DL (ref 70–99)
HCT VFR BLD CALC: 41 % (ref 40.5–52.5)
HEMOGLOBIN: 13.8 G/DL (ref 13.5–17.5)
LYMPHOCYTES ABSOLUTE: 1.5 K/UL (ref 1–5.1)
LYMPHOCYTES RELATIVE PERCENT: 30.7 %
MCH RBC QN AUTO: 29.1 PG (ref 26–34)
MCHC RBC AUTO-ENTMCNC: 33.6 G/DL (ref 31–36)
MCV RBC AUTO: 86.7 FL (ref 80–100)
MONOCYTES ABSOLUTE: 0.4 K/UL (ref 0–1.3)
MONOCYTES RELATIVE PERCENT: 9 %
NEUTROPHILS ABSOLUTE: 2.7 K/UL (ref 1.7–7.7)
NEUTROPHILS RELATIVE PERCENT: 54.9 %
PDW BLD-RTO: 13.6 % (ref 12.4–15.4)
PLATELET # BLD: 233 K/UL (ref 135–450)
PMV BLD AUTO: 7.2 FL (ref 5–10.5)
POTASSIUM REFLEX MAGNESIUM: 4.4 MMOL/L (ref 3.5–5.1)
RBC # BLD: 4.74 M/UL (ref 4.2–5.9)
SODIUM BLD-SCNC: 143 MMOL/L (ref 136–145)
WBC # BLD: 4.9 K/UL (ref 4–11)

## 2019-05-30 PROCEDURE — 92507 TX SP LANG VOICE COMM INDIV: CPT

## 2019-05-30 PROCEDURE — 36415 COLL VENOUS BLD VENIPUNCTURE: CPT

## 2019-05-30 PROCEDURE — 97112 NEUROMUSCULAR REEDUCATION: CPT

## 2019-05-30 PROCEDURE — 80048 BASIC METABOLIC PNL TOTAL CA: CPT

## 2019-05-30 PROCEDURE — 97530 THERAPEUTIC ACTIVITIES: CPT

## 2019-05-30 PROCEDURE — 97535 SELF CARE MNGMENT TRAINING: CPT

## 2019-05-30 PROCEDURE — 94760 N-INVAS EAR/PLS OXIMETRY 1: CPT

## 2019-05-30 PROCEDURE — 97110 THERAPEUTIC EXERCISES: CPT

## 2019-05-30 PROCEDURE — 97116 GAIT TRAINING THERAPY: CPT

## 2019-05-30 PROCEDURE — 6370000000 HC RX 637 (ALT 250 FOR IP): Performed by: PHYSICAL MEDICINE & REHABILITATION

## 2019-05-30 PROCEDURE — 85025 COMPLETE CBC W/AUTO DIFF WBC: CPT

## 2019-05-30 PROCEDURE — 1280000000 HC REHAB R&B

## 2019-05-30 PROCEDURE — 92526 ORAL FUNCTION THERAPY: CPT

## 2019-05-30 RX ADMIN — MULTIPLE VITAMINS W/ MINERALS TAB 1 TABLET: TAB at 08:21

## 2019-05-30 RX ADMIN — AMLODIPINE BESYLATE 10 MG: 10 TABLET ORAL at 08:21

## 2019-05-30 RX ADMIN — METOPROLOL TARTRATE 25 MG: 25 TABLET ORAL at 20:38

## 2019-05-30 RX ADMIN — TRAZODONE HYDROCHLORIDE 100 MG: 100 TABLET ORAL at 22:43

## 2019-05-30 RX ADMIN — ATORVASTATIN CALCIUM 80 MG: 80 TABLET, FILM COATED ORAL at 20:38

## 2019-05-30 RX ADMIN — CLOPIDOGREL BISULFATE 75 MG: 75 TABLET, FILM COATED ORAL at 08:21

## 2019-05-30 RX ADMIN — METOPROLOL TARTRATE 25 MG: 25 TABLET ORAL at 08:21

## 2019-05-30 RX ADMIN — SENNOSIDES,DOCUSATE SODIUM 2 TABLET: 8.6; 5 TABLET, FILM COATED ORAL at 20:38

## 2019-05-30 RX ADMIN — SENNOSIDES,DOCUSATE SODIUM 2 TABLET: 8.6; 5 TABLET, FILM COATED ORAL at 08:21

## 2019-05-30 RX ADMIN — ASPIRIN 81 MG 81 MG: 81 TABLET ORAL at 08:21

## 2019-05-30 ASSESSMENT — PAIN SCALES - GENERAL
PAINLEVEL_OUTOF10: 0

## 2019-05-30 ASSESSMENT — 9 HOLE PEG TEST
TEST_RESULT: IMPAIRED
TEST_RESULT: FUNCTIONAL
TESTTIME_SECONDS: 51.59
TESTTIME_SECONDS: 23.39

## 2019-05-30 NOTE — PROGRESS NOTES
66 yr old male admitted to acute inpatient rehab s/p CVA. Continues with some facial droop noted when patient is speaking however no hesitation with word finding noted this date. Compliant with staff assist for ambulation and transfers and is using call light as needed. Remains continent of bowel and bladder and is now able to set own meal trays up and open packages. Patient verbalized confidence with this task and was extremely proud. Continue to offer encouragement as needed. Denied further c/o.

## 2019-05-30 NOTE — PLAN OF CARE
Problem: Risk for Impaired Skin Integrity  Goal: Tissue integrity - skin and mucous membranes  Description  Structural intactness and normal physiological function of skin and  mucous membranes.   Outcome: Ongoing     Problem: Falls - Risk of:  Goal: Will remain free from falls  Description  Will remain free from falls  Outcome: Ongoing  Goal: Absence of physical injury  Description  Absence of physical injury  Outcome: Ongoing     Problem: Pain:  Goal: Control of acute pain  Description  Control of acute pain  Outcome: Ongoing     Problem: Tobacco Use:  Goal: Inpatient tobacco use cessation counseling participation  Description  Inpatient tobacco use cessation counseling participation  Outcome: Ongoing     Problem: ACTIVITY INTOLERANCE/IMPAIRED MOBILITY  Goal: Mobility/activity is maintained at optimum level for patient  Outcome: Ongoing     Problem: COMMUNICATION IMPAIRMENT  Goal: Ability to express needs and understand communication  Outcome: Ongoing

## 2019-05-30 NOTE — PROGRESS NOTES
[] Severe [] Unable to Assess  [] Other:  Behavior: [x] Alert  [x] Cooperative  []  Pleasant  [] Confused  [] Agitated  [] Uncooperative  [] Distractible [x] Motivated  [] Self-Limiting [] Anxious  [] Other:  Endurance:  [x] Adequate for participation in SLP sessions  [] Reduced overall  [] Lethargic  [] Other:  Safety: [x] No concerns at this time  [] Reduced insight into deficits  []  Reduced safety awareness [] Not following call light procedures  [] Unable to Assess  [] Other:  Swallowing Precautions: Sit up for all meals and thereafter for 30 minutes, Eat with small bites (1/2 tsp; 1 tsp), Alternate solids with liquids and Check mouth for food residue after snacks and meals, Chin Tuck w liquids, chin tuck and double swallow w solids  Barriers toward progress: Upper extremity weakness and Lower extremity weakness  Discharge recommendations:  [] Home independently  [x] Home with assistance []  24 hour supervision  [] ECF [] Other:  Continued Tx Upon Discharge: ? [x] Yes [] No [] TBD based on progress while on ARU [x] Vital Stim indicated [] Other:   Estimated discharge date: 6/1           Date: 5/30/2019 4951-8025     Tx session 1 Tx session 2   Total Timed Code Min 15    Total Treatment Minutes 45 n/a   Individual Treatment Minutes 45    Group Treatment Minutes 0 0   Co-Treat Minutes 0 0   Variance/Reason:  n/a    Pain denies    Pain Intervention [] RN notified  [] Repositioned  [] Intervention offered and patient declined  [x] N/A  [] Other: [] RN notified  [] Repositioned  [] Intervention offered and patient declined  [] N/A  [] Other:   Subjective     Seen in upright in w/c in tx office for tx, cooperative and motivated. Continued VitalStim NMES with dysphagia tx. Pt agreeable . Objective:  Goals  Pt goal is to return home as independent as possible   ongoing      Short-term Goals  Timeframe for Short-term Goals: 2wks           Dysphagia Goals:  The patient will tolerate recommended diet without observed clinical signs of aspiration (discontinue)    GOAL UPDATED 5/21:  The patient will tolerate regular consistency food and thin liquid diet , with compensatory swallow posture/strategy, without observed clinical signs of aspiration  Direct Dysphagia treatment f/u    -thin liquids by cup with lingual hold and chin tuck posture: not targeted    -thin liquids by cup without compensatory strategy x5: no overt clinical s/s of penetration or aspiration. No anterior loss    -regular solid food (grahams) x8 with compensatory strategy: continued improvement of left oral motor muscular engagement during mastication and bolus formation; and improved rate of A-P oral transit; No anterior loss. No overt clinical s/s of penetration or aspiration post swallow. No pt complaints post swallow  · Pt able to demonstrate bolus formation and manipulation to mid tongue prior to swallow    PO trials in conjunction with NMES vital stimulation (oropharyngeal sling) n/a   The patient will improve oral motor function via therapeutic oral motor exercises to 5/5 each trial. Targeted in conjunction with NMES oropharyngeal sling:    Left facial droop:   -volitional lip rounding: improved to mild + rom x10  -volitional lip protrusion: minimal to mild left rom x10  -lip retraction with scrunching nose: improved mild to moderate rom x10  -labial/buccal engagement for alternating 'wink' or pulling rounded lips bilaterally x10: improving ease in alternating and improving left facial muscular engagement    Left tactile/sensory stimulation : targeted with NMES vital stimulation oropharyngeal sling    Muscle engagement left against resistance:  -not targeted     Bolus formation/control:   -left labial/buccal muscular engagement during mastication.  No L pocketing noted      -lingual protrusion : trace asymmetry  -lateral lingual movement: minimally reduced  -lingual elevation (anterior/posterior): coordination ex: fair to good x10  -lingual agility and control (licking lips in [place]) : clockwise and alternating counter clockwise: good rom and labial contact, improved control this session. x10  -BOT elevation: /k/ words: 3 sets x 10, improved palatal contact/precision    TBR :   -alternating tongue protrusion with retraction for 'ugu' x10, min impairment     n/a   The patient will improve oral preparation phase via bolus control/manipulation exercises to 5/5 each trial. Targeted during po trials w song crackers x7: -emphasizing lingual shaping for lingual hold  -bolus formation and manipulation to mid tongue trace impairment, much improved from yesterday - use of mirror for self awareness and correction  -rotary mastication targeted bilaterally  -no observed or reported pocketing   -labial seal n/a   The patient/caregiver will demonstrate understanding of compensatory strategies for improved swallowing safety. ongoing review and recall with (I) to intermittent cues n/a   NEW Modified GOAL 5/24: Pt will improve rate of swallow initiation via thermal gustatory stimulation; NMES vital stimulation        NMES position 4A, pt tolerated well for  11.0 mA and 3.5 mA 35 min during completion of labial, buccal, lingual and laryngeal pharyngeal exs ; and during po presentations    The patient will improve pharyngeal phase via pharyngolaryngeal exercises 5/5 each for increased swallow safety with PO diet.  (Discontinue 5/22)  UPDATED GOAL 5/22: Pt will demonstrate improved laryngeal elevation via laryngeal ex 15/15 reps       Jennifer: not completed    Glottals on expiratory effort: 3 sets x10 each, improved adduction w cues/training    BOT with pitch variation low to high with falsetto hold for 2-3 seconds:  X 10 reps n/a   Speech/Cognitive goals       Goal 1: The patient will improve speech intelligibility to >90% in conversation via compensatory strategy use and oral motor/vocal strengthening.   -less structured conversational exchange: 100% intelligible and >95%

## 2019-05-30 NOTE — PROGRESS NOTES
term goal 1: Pt will perform bathing with min A and shower chair. MET   Short term goal 2: Pt will perform upper body dressing SBA and lower body dressing min A except Eric Hose. MET  Short term goal 3: Pt will perform toileting with CGA and grab bars PRN. MET per self report/nursing  Short term goal 4: Pt will perform functional mobility / transfers with CGA and LRAD. MET  Short term goal 5: Pt will improve LUE AROM-HEP / strength / coordination through there ex / neuro re-ed to actively use for bilateral ADL tasks 25% of time. MET  Long term goals  Time Frame for Long term goals : 2-3 weeks   Long term goal 1: Pt will perform bathing with mod ind and shower chair   Long term goal 2: Pt will perform dressing with mod ind  Long term goal 3: Pt will perform toileting with mod ind  Long term goal 4: Pt will perform functional mobility / transfers with mod ind and LRAD   Long term goal 5: Pt will improve LUE AROM-HEP / strength / coordination through there ex / neuro re-ed to actively use for bilateral ADL tasks 50% of time. Long term goals 6: Pt will improve balance, L side awareness and activity tolerance to perform object retrieval / transport for simple IADL tasks with supervision. Patient Goals   Patient goals : I want to \"get my limbs back\" to be able to work again as , drive again, and be intimate with my girl friend. Therapy Time   Individual Concurrent Group Co-treatment   Time In 1115         Time Out 1200         Minutes 45         Timed Code Treatment Minutes: 45 Minutes     Therapy Time     Individual Co-treatment   Time In 1430     Time Out 4508     Minutes 206 [facility] OT/S    Therapist was present, directed patients care, made skilled judgement, and was responsible for assessment and treatment of the patient.       Merle Jones OTR/L #[id number]

## 2019-05-30 NOTE — PROGRESS NOTES
Pt. admiited for stroke. Alert,oriented x 4. Requesting Trazodone for sleep. Meds. given. Effective. Has been continent with bladder and bowel. Calls appropriately. Will continue to monitor.

## 2019-05-30 NOTE — PROGRESS NOTES
online and checks   Additional Comments: Patient reports one fall since his stroke the past few days. Patient currently remodeling his house which is 3 KOSTA enter house, 1 level, 1 tub/shower      Restrictions  Restrictions/Precautions  Restrictions/Precautions: Fall Risk  Position Activity Restriction  Other position/activity restrictions: cardiac diet   Subjective   General  Chart Reviewed: Yes  Additional Pertinent Hx: \"66year-old male with sudden onset of left arm, leg, and facial weakness and numbness on 5/12 despite taking Eliquis. Mindy Loose He came into the hospital head CT scan showed infarction right basal ganglia area. He does have a history of sickle cell trait. Patient seen by neurology, and switch his medications to aspirin and Plavix. Patient also seen by hematology, and blood work for hypercoagulable state is in progress. MRI results noted acute right basal ganglia infarct. Echo negative for thrombus or shunting. Patient started therapies, but needs more therapy before discharged to home safely due to his left-sided weakness. . \" (Dr. Dick Salcedo 5/15/19) Pt reports initial stroke 5/7/19 this date returned to home but states slowly decreased in functional mobility. Response To Previous Treatment: Patient with no complaints from previous session. Family / Caregiver Present: No  Referring Practitioner: Dr. Jimenez/Dr. Leslie Murdock   Subjective  Subjective: Pt without complaints this morning, reports eager to return home Saturday without concerns. Agreeable to PT treatment session.  States that he feels like he is sleeping much better   Pain Screening  Patient Currently in Pain: Denies  Pain Assessment  Pain Assessment: 0-10  Pain Level: 0  Patient's Stated Pain Goal: No pain  Vital Signs  Patient Currently in Pain: Denies       Orientation  Orientation  Overall Orientation Status: Within Normal Limits  Objective   Bed mobility  Bridging: Modified independent   Rolling to Left: Modified independent  Rolling to Right: Modified finished with my house remodel and retire\"     Plan    Plan  Times per week: 5-6X   Times per day: Twice a day  Plan weeks: 2-3  Current Treatment Recommendations: Strengthening, Balance Training, Functional Mobility Training, Transfer Training, ROM, Endurance Training, Wheelchair Mobility Training, Neuromuscular Re-education, Stair training, ADL/Self-care Training, Gait Training, Home Exercise Program, Safety Education & Training, Positioning, Equipment Evaluation, Education, & procurement, Patient/Caregiver Education & Training  Safety Devices  Type of devices:  All fall risk precautions in place, Gait belt, Left in chair, Patient at risk for falls(care transition to [facility] speech therapy at completion of session )  Restraints  Initially in place: No     Therapy Time   Individual Concurrent Group Co-treatment   Time In 0935         Time Out 1030         Minutes 55         Timed Code Treatment Minutes: 54 [address] [facility] [address]   Time In 1530     Time Out 1600     Minutes 8060 [facility] Casimiro Leyden, PT       Electronically signed by Flavia Azul PT on 5/30/2019 at 11:03 AM

## 2019-05-31 PROCEDURE — 92507 TX SP LANG VOICE COMM INDIV: CPT

## 2019-05-31 PROCEDURE — 97530 THERAPEUTIC ACTIVITIES: CPT

## 2019-05-31 PROCEDURE — 6370000000 HC RX 637 (ALT 250 FOR IP): Performed by: PHYSICAL MEDICINE & REHABILITATION

## 2019-05-31 PROCEDURE — 97116 GAIT TRAINING THERAPY: CPT

## 2019-05-31 PROCEDURE — 1280000000 HC REHAB R&B

## 2019-05-31 PROCEDURE — 97112 NEUROMUSCULAR REEDUCATION: CPT

## 2019-05-31 PROCEDURE — 94760 N-INVAS EAR/PLS OXIMETRY 1: CPT

## 2019-05-31 PROCEDURE — 97535 SELF CARE MNGMENT TRAINING: CPT

## 2019-05-31 PROCEDURE — 92526 ORAL FUNCTION THERAPY: CPT

## 2019-05-31 PROCEDURE — 97110 THERAPEUTIC EXERCISES: CPT

## 2019-05-31 RX ORDER — ATORVASTATIN CALCIUM 80 MG/1
80 TABLET, FILM COATED ORAL DAILY
Qty: 30 TABLET | Refills: 0 | Status: SHIPPED | OUTPATIENT
Start: 2019-05-31

## 2019-05-31 RX ORDER — AMLODIPINE BESYLATE 10 MG/1
10 TABLET ORAL DAILY
Qty: 30 TABLET | Refills: 0 | Status: SHIPPED | OUTPATIENT
Start: 2019-05-31

## 2019-05-31 RX ORDER — CLOPIDOGREL BISULFATE 75 MG/1
75 TABLET ORAL DAILY
Qty: 30 TABLET | Refills: 0 | Status: SHIPPED | OUTPATIENT
Start: 2019-05-31

## 2019-05-31 RX ORDER — ASPIRIN 81 MG/1
81 TABLET, CHEWABLE ORAL DAILY
Qty: 30 TABLET | Refills: 0 | Status: SHIPPED | OUTPATIENT
Start: 2019-05-31

## 2019-05-31 RX ORDER — TRAZODONE HYDROCHLORIDE 100 MG/1
100 TABLET ORAL NIGHTLY PRN
Qty: 30 TABLET | Refills: 0 | Status: SHIPPED | OUTPATIENT
Start: 2019-05-31

## 2019-05-31 RX ADMIN — MULTIPLE VITAMINS W/ MINERALS TAB 1 TABLET: TAB at 08:28

## 2019-05-31 RX ADMIN — METOPROLOL TARTRATE 25 MG: 25 TABLET ORAL at 08:27

## 2019-05-31 RX ADMIN — CLOPIDOGREL BISULFATE 75 MG: 75 TABLET, FILM COATED ORAL at 08:27

## 2019-05-31 RX ADMIN — ASPIRIN 81 MG 81 MG: 81 TABLET ORAL at 08:27

## 2019-05-31 RX ADMIN — TRAZODONE HYDROCHLORIDE 100 MG: 100 TABLET ORAL at 21:29

## 2019-05-31 RX ADMIN — ATORVASTATIN CALCIUM 80 MG: 80 TABLET, FILM COATED ORAL at 19:51

## 2019-05-31 RX ADMIN — AMLODIPINE BESYLATE 10 MG: 10 TABLET ORAL at 08:27

## 2019-05-31 RX ADMIN — TRAMADOL HYDROCHLORIDE 100 MG: 50 TABLET, FILM COATED ORAL at 19:55

## 2019-05-31 ASSESSMENT — PAIN SCALES - GENERAL
PAINLEVEL_OUTOF10: 0
PAINLEVEL_OUTOF10: 0
PAINLEVEL_OUTOF10: 7
PAINLEVEL_OUTOF10: 0
PAINLEVEL_OUTOF10: 0

## 2019-05-31 ASSESSMENT — PAIN DESCRIPTION - PAIN TYPE: TYPE: CHRONIC PAIN

## 2019-05-31 ASSESSMENT — PAIN - FUNCTIONAL ASSESSMENT: PAIN_FUNCTIONAL_ASSESSMENT: ACTIVITIES ARE NOT PREVENTED

## 2019-05-31 ASSESSMENT — PAIN DESCRIPTION - ONSET: ONSET: ON-GOING

## 2019-05-31 ASSESSMENT — PAIN DESCRIPTION - LOCATION: LOCATION: BACK

## 2019-05-31 ASSESSMENT — PAIN DESCRIPTION - FREQUENCY: FREQUENCY: INTERMITTENT

## 2019-05-31 ASSESSMENT — PAIN DESCRIPTION - PROGRESSION: CLINICAL_PROGRESSION: GRADUALLY IMPROVING

## 2019-05-31 ASSESSMENT — PAIN DESCRIPTION - DESCRIPTORS: DESCRIPTORS: ACHING;DISCOMFORT

## 2019-05-31 NOTE — PLAN OF CARE
Problem: Risk for Impaired Skin Integrity  Goal: Tissue integrity - skin and mucous membranes  Description  Structural intactness and normal physiological function of skin and  mucous membranes.   Outcome: Ongoing     Problem: Falls - Risk of:  Goal: Will remain free from falls  Description  Will remain free from falls  Outcome: Ongoing  Goal: Absence of physical injury  Description  Absence of physical injury  Outcome: Ongoing     Problem: Pain:  Goal: Control of acute pain  Description  Control of acute pain  Outcome: Ongoing     Problem: Neurological  Goal: Maximum potential motor/sensory/cognitive function  Outcome: Ongoing     Problem: Tobacco Use:  Goal: Inpatient tobacco use cessation counseling participation  Description  Inpatient tobacco use cessation counseling participation  Outcome: Ongoing     Problem: ACTIVITY INTOLERANCE/IMPAIRED MOBILITY  Goal: Mobility/activity is maintained at optimum level for patient  Outcome: Ongoing     Problem: COMMUNICATION IMPAIRMENT  Goal: Ability to express needs and understand communication  Outcome: Ongoing

## 2019-05-31 NOTE — PROGRESS NOTES
chair MET  Long term goal 2: Pt will perform dressing with mod ind MET  Long term goal 3: Pt will perform toileting with mod ind MET  Long term goal 4: Pt will perform functional mobility / transfers with mod ind and LRAD MET  Long term goal 5: Pt will improve LUE AROM-HEP / strength / coordination through there ex / neuro re-ed to actively use for bilateral ADL tasks 50% of time. MET  Long term goals 6: Pt will improve balance, L side awareness and activity tolerance to perform object retrieval / transport for simple IADL tasks with supervision. MET  Patient Goals   Patient goals : I want to \"get my limbs back\" to be able to work again as , drive again, and be intimate with my girl friend. Therapy Time   Individual Concurrent Group Co-treatment   Time In 0700         Time Out 0750         Minutes 50         Timed Code Treatment Minutes: 50 Minutes   Therapy Time     Individual Co-treatment   Time In 1300     Time Out 5115     Minutes Tar[facility] [address] OT/S    Therapist was present, directed patients care, made skilled judgement, and was responsible for assessment and treatment of the patient.       Val Morris, OTR/L #[id number]

## 2019-05-31 NOTE — PROGRESS NOTES
Health Aide needs for personal care   Pt. Safe to return home with assistance from family PRn. Provided pt. with standing HEP.    Electronically signed by Veronica Garcia PT on 5/31/2019 at 10:36 AM

## 2019-05-31 NOTE — CARE COORDINATION
MAYLIN spoke with sadia Escobar over the phone. Addressed multiple questions/concerns regarding home care and DME. Informed her of DME delivered to patient's room today for discharge tomorrow. Informed her of patient/family choice regarding home care agency. She would like referral to [facility]. Confirmed they are able to accept patient for PT/OT/SLP/RN.  Carole Carney from [facility] able to pull LIDIA/AVS.    Electronically signed by JEB Em on 5/31/2019 at 3:00 PM

## 2019-05-31 NOTE — PROGRESS NOTES
functional mobility ; Decreased endurance;Decreased ROM; Decreased strength;Decreased safe awareness;Decreased balance;Decreased coordination;Decreased fine motor control;Decreased sensation  Assessment: Pt improving with all mobility tasks mod I. Only supervision for attempting folded RW on steps this date, pt mod I with bilateral railing overall. Pt safe for discharge home with assistance PRN using RW for mobility. Demonstrating some recovery to left side, Continued home PT to promotes safe transfer to home, independence/safety with all functional mobility tasks including higher level balance to promote safe return to home. Treatment Diagnosis: Difficulty with ambulation/decrease strength secondary to L sided weakness   Prognosis: Good  Patient Education: educated on standing HEP, safety with discharge home   REQUIRES PT FOLLOW UP: Yes  Activity Tolerance  Activity Tolerance: Patient Tolerated treatment well  Activity Tolerance: compensates well but attempting to promote recovery     Goals  Short term goals  Time Frame for Short term goals: 1 week  Short term goal 1: sit <-> stand transfers CGA-met   Short term goal 2: ambulate 150 ft with LRAD CGA - met   Short term goal 3: ascend/descend 4 steps unilateral railing with assistance - met   Short term goal 4: ascend/descend curb step with LRAD and assistance.  - met   Long term goals  Time Frame for Long term goals : 2-3 weeks   Long term goal 1: sit <-> stand transfer mod I - met   Long term goal 2: ambulate with LRAD 200' mod I - met   Long term goal 3: ascend/descend 12 steps unilateral railing mod I - met   Long term goal 4: ascend/descend curb step with LRAD mod I- met   Patient Goals   Patient goals : \"get well, get finished with my house remodel and retire\"     Plan    Plan  Times per week: 5-6X   Times per day: Twice a day  Plan weeks: 2-3  Current Treatment Recommendations: Strengthening, Balance Training, Functional Mobility Training, Transfer Training,

## 2019-05-31 NOTE — PROGRESS NOTES
Nutrition Assessment (Low Risk)    Type and Reason for Visit: Reassess    Nutrition Recommendations:   Continue Cardiac diet. Nutrition Assessment:  Patient assessed for nutritional risk. Deemed to be at low risk at this time. Will continue to monitor for changes in status.       Malnutrition Assessment:  · Malnutrition Status: No malnutrition    Nutrition Risk Level   Risk Level: Low    Nutrition Diagnosis:   · Problem: No nutrition diagnosis at this time    Nutrition Intervention:  Food and/or Delivery: Continue current diet  Nutrition Education/Counseling/Coordination of Care:  Continued Inpatient Monitoring      Electronically signed by Elizabeth Garcia RD, NATALYA on 5/31/19 at 10:54 AM    Contact Number: [phone]

## 2019-05-31 NOTE — DISCHARGE INSTR - COC
None    Nursing Mobility/ADLs:  Walking   Assisted  Transfer  Assisted  Bathing  Assisted  Dressing  Assisted  Toileting  Assisted  Feeding  Assisted  Med Admin  Assisted  Med Delivery   whole    Wound Care Documentation and Therapy:        Elimination:  Continence:   · Bowel: Yes  · Bladder: Yes  Urinary Catheter: None   Colostomy/Ileostomy/Ileal Conduit: No       Date of Last BM: 5/30    Intake/Output Summary (Last 24 hours) at 5/31/2019 1251  Last data filed at 5/31/2019 0928  Gross per 24 hour   Intake 720 ml   Output 300 ml   Net 420 ml     I/O last 3 completed shifts: In: 5 [P.O.:720]  Out: 700 [Urine:700]    Safety Concerns: At Risk for Falls    Impairments/Disabilities:      Vision    Nutrition Therapy:  Current Nutrition Therapy:   - Oral Diet:  Cardiac    Routes of Feeding: Oral  Liquids: Thin Liquids  Daily Fluid Restriction: no  Last Modified Barium Swallow with Video (Video Swallowing Test): not done    Treatments at the Time of Hospital Discharge:   Respiratory Treatments: N/A  Oxygen Therapy:  is not on home oxygen therapy.   Ventilator:    - No ventilator support    Rehab Therapies: Physical Therapy and Occupational Therapy  Weight Bearing Status/Restrictions: No weight bearing restirctions  Other Medical Equipment (for information only, NOT a DME order):  walker  Other Treatments: N/A    Patient's personal belongings (please select all that are sent with patient):  clothes; 2 bags; ear piece for phone and ; phone and ; shoes    RN SIGNATURE:  Electronically signed by Rosario Ballard RN on 6/1/19 at 10:35 AM      CASE MANAGEMENT/SOCIAL WORK SECTION    Inpatient Status Date: 5/15/19    Readmission Risk Assessment Score:  Readmission Risk              Risk of Unplanned Readmission:        11           Discharging to Facility/ Agency   · Name: [facility]  · Address: [id number] [place] [address] [place] , [address] [place] [address]  · Phone:  [phone]  · Fax: [phone]      /

## 2019-05-31 NOTE — PLAN OF CARE
Problem: Risk for Impaired Skin Integrity  Goal: Tissue integrity - skin and mucous membranes  Description  Structural intactness and normal physiological function of skin and  mucous membranes. 5/31/2019 1113 by Irineo Ray RN  Outcome: Ongoing  Note:   Pt is at risk for impaired skin integrity. Assess skin every shift and prn. Turn every 2 hours. Keep heels off bed. Keep skin clean and dry. Pt has steri strips to left chest loop recorder site. 5/31/2019 0338 by Franco Ricardo RN  Outcome: Ongoing     Problem: Falls - Risk of:  Goal: Will remain free from falls  Description  Will remain free from falls  5/31/2019 1113 by Irineo Ray RN  Outcome: Ongoing  Note:   Pt is at risk for falls. Call light in reach. Bed in low position. Alarm on. Nonskid footwear on. Possessions in reach. Pt transfers with walker and gait belt. 5/31/2019 0338 by Franco Ricardo RN  Outcome: Ongoing     Problem: Pain:  Goal: Control of acute pain  Description  Control of acute pain  5/31/2019 1113 by Irineo Ray RN  Outcome: Ongoing  Note:   Patient denies any pain at this time. Will continue to monitor.    5/31/2019 0338 by Franco Ricardo RN  Outcome: Ongoing

## 2019-05-31 NOTE — CARE COORDINATION
[facility] rep delivered requested 2-Wheeled Walker and [employer] [address] w/Back to patient and reviewed insurance coverage and equipment set up with patient. Notified RN.     Thank you for the referral.  Electronically signed by Narendra Mann on 5/31/2019 at 2:13 PM Cell ph# [phone]

## 2019-05-31 NOTE — PROGRESS NOTES
patient will demonstrate functional planning and organization for functional calculations, money and finance management, med management with intermittent to min cues      GOAL MET; may benefit from targeting/review in home setting for high level ADLs  n/a   Other areas targeted:     Education:   Ongoing pt ed in progress; treatment POC    Safety Devices: [x] Call light within reach  [] Chair alarm activated  [] Bed alarm activated  [] Other:  [] Call light within reach  [] Chair alarm activated  [] Bed alarm activated  [] Other:    Assessment: 5/17: Moderately impaired left sided labial, buccal, and lingual strength, ROM and tone which affects pt's intellgibility and articulatory precision at sentence level and oral phase of the swallow (ie, concern for premature spillage, minimal oral residue, increased labor and fatigue at meals). Concern for mild decreased laryngeal elevation and reduced airway protection, but no overt s/s of aspiration during CSE. Mild cognitive impairment with working memory, recall of new information, concern for reduced complex and high level PS, with further assess of money/medication management warranted. 5/20/2019: Pt and staff reporting increase dysphagia symptoms. Pt/staff complaints of increase in left facial droop. Re-assessment of oral motor speech; oral motor rom/oral sensory for gag and palatal response; skilled therapy trials. Pt this date self reports chin tuck helps but persistent complaints of sensation something sticking left side of throat (pointing to mid neck). Will request MBSS  5/22/19: MBS results from 5/21 recommend continue regular diet texture and thin liquids with chin tuck w all consistencies and dry swallow w solids d/t deep penetration with thin liquids and trace aspiration after the swallow, which is eliminated with chin tuck. Consider NMES.  5/23/19: Recall of new functional information improving with (I) to min/ Set up cues.   Simple functional math skills

## 2019-06-01 VITALS
HEART RATE: 50 BPM | DIASTOLIC BLOOD PRESSURE: 72 MMHG | RESPIRATION RATE: 18 BRPM | HEIGHT: 72 IN | BODY MASS INDEX: 20.25 KG/M2 | OXYGEN SATURATION: 98 % | WEIGHT: 149.47 LBS | SYSTOLIC BLOOD PRESSURE: 124 MMHG | TEMPERATURE: 97.3 F

## 2019-06-01 PROCEDURE — 6370000000 HC RX 637 (ALT 250 FOR IP): Performed by: PHYSICAL MEDICINE & REHABILITATION

## 2019-06-01 PROCEDURE — 94760 N-INVAS EAR/PLS OXIMETRY 1: CPT

## 2019-06-01 RX ADMIN — METOPROLOL TARTRATE 25 MG: 25 TABLET ORAL at 09:15

## 2019-06-01 RX ADMIN — CLOPIDOGREL BISULFATE 75 MG: 75 TABLET, FILM COATED ORAL at 09:15

## 2019-06-01 RX ADMIN — MULTIPLE VITAMINS W/ MINERALS TAB 1 TABLET: TAB at 09:15

## 2019-06-01 RX ADMIN — AMLODIPINE BESYLATE 10 MG: 10 TABLET ORAL at 09:16

## 2019-06-01 RX ADMIN — SENNOSIDES,DOCUSATE SODIUM 2 TABLET: 8.6; 5 TABLET, FILM COATED ORAL at 09:15

## 2019-06-01 RX ADMIN — ASPIRIN 81 MG 81 MG: 81 TABLET ORAL at 09:15

## 2019-06-01 ASSESSMENT — PAIN SCALES - GENERAL: PAINLEVEL_OUTOF10: 0

## 2019-06-01 NOTE — PLAN OF CARE
Problem: Risk for Impaired Skin Integrity  Goal: Tissue integrity - skin and mucous membranes  Description  Structural intactness and normal physiological function of skin and  mucous membranes. 6/1/2019 0000 by Cosme Manuel RN  Outcome: Ongoing   Patient is able to shift weight without assistance. Reposition every two hours. Skin assessed every shift. No new area of breakdown. Skin warm and dry to touch. Waffle cushion in chair. Problem: Falls - Risk of:  Goal: Will remain free from falls  Description  Will remain free from falls  6/1/2019 0000 by Cosme Manuel RN  Outcome: Ongoing   Patient remained free of any falls this shift. Call light in reach at all times. Non skid footwear on. Patient encouraged to call for help when needed. Room free of clutter. Alarms on. Problem: Pain:  Goal: Control of acute pain  Description  Control of acute pain  6/1/2019 0000 by Cosme Manuel RN  Outcome: Ongoing   Pain assessed using 0-10 scale. Offer PRN medication as ordered by MD. Adelaida Gutierrez 30 minutes after giving.

## 2019-06-01 NOTE — DISCHARGE SUMMARY
Physical Medicine & Rehabilitation  Discharge Summary     Patient Identification:  Eliz Tapia  : 1955  Admit date: 5/15/2019  Discharge date: 2019   Attending provider: Zbigniew Mejia MD       Primary care provider: Burnard Boas, MD     Discharge Diagnoses:   Patient Active Problem List   Diagnosis    Acute CVA (cerebrovascular accident) [facility])    Right-sided cerebrovascular accident (CVA) [facility])       History of Present Illness/Acute Hospital Course:  62-year-old male with pmh recent right parietal stroke (started on Eliquis), sickle cell trait, HTN who initially presented 21 with sudden onset of left arm, leg, and facial weakness and numbness. Imaging showed acute infarction right basal ganglia area. Patient seen by neurology who switched his medications to aspirin and Plavix (previously on Eliquis). Patient also seen by hematology, and blood work for hypercoagulable state is in progress. Echo negative for thrombus or shunting. Inpatient Rehabilitation Course:   Eliz Tapia is a 66 y.o. male admitted to inpatient rehabilitation on 5/15/2019 with Right-sided cerebrovascular accident (CVA) ([facility] [address] The patient participated in an aggressive multidisciplinary inpatient rehabilitation program involving 3 hours of therapy per day, at least 5 days per week. He made great progress with regard to his left side strength, coordination, balance. Initially focused on compensatory strategies but then placed more emphasis on neuro recovery as he regained motor function. His swallow, cognition, and dysarthria are improving though he still has ongoing facial weakness. HE is now overall modified independent. He is discharging home with significant other. Home care services and DME ordered as below. HE will need follow-up with PCP, Neurology, and Cardiology (for loop recorder f/u). He can f/u with PM&R for any rehab needs.      Impairments: left hemiparesis and sensory deficit, balance, dysarthria, dysphagia, cognition    Medical Management:  Acute ischemic R basal ganglia stroke - Hypercoagulable work-up negative. Loop recorder in place. Secondary ppx with ASA+plavix+statin per Neurology. PT/OT/SLP.      Dysphagia - Dietitian and SLP consults. Currently on regular + thins. s/p MBS (5/21).      HTN - amlodipine increased and added metoprolol, now improved.      Sickle Cell Trait - Hgb stable.      Multiple thyroid nodules - Incidental finding on CT. Will need thyroid US as outpatient.      Sleep - Chronic issue for patient. Improved with trazodone       Discharge Exam:  Const: Alert. No distress, pleasant. HEENT: Normocephalic, atraumatic. Normal sclera/conjunctiva. MMM. CV: Regular rate and rhythm. Resp: No respiratory distress. Lungs CTAB. Abd: NABS+. Soft, nontender, nondistended   Ext: No edema. Neuro: Alert, oriented, appropriately interactive. +Dysarthria. Left hemiparesis gradully improving, overall 4/5. Psych: Cooperative, appropriate mood and affect          Discharge Functional Status:    Physical therapy:  Bed Mobility: Scooting: Modified independent  Transfers: Sit to Stand: Modified independent  Stand to sit: Modified independent  Bed to Chair: Unable to assess  Stand Pivot Transfers: Contact guard assistance(with walker, to patient's right to bed from W/C)  Comment: Floor transfer sitting in chair <-> mat on floor X 2 trials mod I using chair for support during transfer, increased time to perform but able to perform without assistance this date.  Educated if fall occurs at home to take time and assess if help is need prior to attempting to get back up. , Ambulation 1  Surface: level tile  Device: Rolling Walker  Other Apparatus: (removed L AFO this date )  Assistance: Modified Independent  Quality of Gait: improvement with advancement of RLE but with fatigue demonstrating flexed trunk posture, genurecurvatum on left in mid to termianl stance phase, patient able to reduce

## 2019-06-06 NOTE — CARE COORDINATION
LILI rec'd email from [facility] making call to the patient it was noted that hoem care has not started as he was told he needed to see his PCP first.  Additionally, he informed [facility] that he needed resurces for Disability application and assistance with his utilities due to being out of work. LSW called [facility] this morning to inquire the reason Home Care has not started. JOSEw spoke with Melia Leonard who reports they did the initial start of care and informed him that he needed to see his PCP before staff could begin. He was to see his PCP on 6-6-2019. JOSEW informed Melia Leonard that when a pt leave INpt rehab unit, our MD would follow this patient if he does not have a pcp in place at the time of dc until he would get in with a pcp. JOSEW then called pt to discuss but his number was dead. Call placed to [facility] to discuss. She reports pt went to his girlfriend's house to stay after his discharge and she does not like that environment there. She reports he is seeing the MD today and she is taking him. She states she had come to a road block with his Disability Application as he needed to state his income and he did not keep track. Additionally, she reports he has not filed Taxes in over two years. She could not finalize the application as she could not verify the information requested. LSW suggested she call Social Security for assistance or go to the [employer] for help. LSW pointed her to [facility] website for assistance as well. She reports her father has been self employed for years; ever since she was a child and she does not know if he paid into the [facility] system or not. She also reports he has paid his utilities bill but did not get help from agencies as he needed to be present with ID to obtain. She will continue to assist him. She is a .   [place], [place]     Case Management   [phone]    6/6/2019  9:01 AM

## 2021-07-16 RX ORDER — DEXTROMETHORPHAN/PSEUDOEPHED 2.5-7.5/.8
1.2 DROPS ORAL
Status: CANCELLED | OUTPATIENT
Start: 2021-07-16

## 2021-07-16 RX ORDER — ATROPINE SULFATE 0.1 MG/ML
0.5 INJECTION INTRAVENOUS
Status: CANCELLED | OUTPATIENT
Start: 2021-07-16 | End: 2021-07-17

## 2021-07-16 RX ORDER — DIPHENHYDRAMINE HYDROCHLORIDE 50 MG/ML
50 INJECTION, SOLUTION INTRAMUSCULAR; INTRAVENOUS ONCE
Status: CANCELLED | OUTPATIENT
Start: 2021-07-16 | End: 2021-07-16

## 2021-07-16 RX ORDER — SODIUM CHLORIDE 0.9 % (FLUSH) 0.9 %
5-40 SYRINGE (ML) INJECTION AS NEEDED
Status: CANCELLED | OUTPATIENT
Start: 2021-07-16

## 2021-07-16 RX ORDER — SODIUM CHLORIDE 0.9 % (FLUSH) 0.9 %
5-40 SYRINGE (ML) INJECTION EVERY 8 HOURS
Status: CANCELLED | OUTPATIENT
Start: 2021-07-16

## 2021-07-16 RX ORDER — EPINEPHRINE 0.1 MG/ML
1 INJECTION INTRACARDIAC; INTRAVENOUS
Status: CANCELLED | OUTPATIENT
Start: 2021-07-16 | End: 2021-07-17

## 2021-07-19 ENCOUNTER — HOSPITAL ENCOUNTER (OUTPATIENT)
Dept: PREADMISSION TESTING | Age: 66
Discharge: HOME | End: 2021-07-19
Payer: COMMERCIAL

## 2021-07-19 PROCEDURE — U0003 INFECTIOUS AGENT DETECTION BY NUCLEIC ACID (DNA OR RNA); SEVERE ACUTE RESPIRATORY SYNDROME CORONAVIRUS 2 (SARS-COV-2) (CORONAVIRUS DISEASE [COVID-19]), AMPLIFIED PROBE TECHNIQUE, MAKING USE OF HIGH THROUGHPUT TECHNOLOGIES AS DESCRIBED BY CMS-2020-01-R: HCPCS

## 2021-07-20 LAB — SARS-COV-2, COV2NT: NOT DETECTED

## 2021-07-21 ENCOUNTER — HOSPITAL ENCOUNTER (OUTPATIENT)
Age: 66
Setting detail: OUTPATIENT SURGERY
Discharge: HOME | End: 2021-07-21
Attending: INTERNAL MEDICINE | Admitting: INTERNAL MEDICINE
Payer: COMMERCIAL

## 2021-07-21 VITALS
HEART RATE: 67 BPM | RESPIRATION RATE: 16 BRPM | TEMPERATURE: 98.3 F | WEIGHT: 187.3 LBS | SYSTOLIC BLOOD PRESSURE: 133 MMHG | BODY MASS INDEX: 28.39 KG/M2 | DIASTOLIC BLOOD PRESSURE: 70 MMHG | HEIGHT: 68 IN | OXYGEN SATURATION: 98 %

## 2021-07-21 PROCEDURE — 77030040361 HC SLV COMPR DVT MDII -B: Performed by: INTERNAL MEDICINE

## 2021-07-21 PROCEDURE — G0500 MOD SEDAT ENDO SERVICE >5YRS: HCPCS | Performed by: INTERNAL MEDICINE

## 2021-07-21 PROCEDURE — 76040000007: Performed by: INTERNAL MEDICINE

## 2021-07-21 PROCEDURE — 99153 MOD SED SAME PHYS/QHP EA: CPT | Performed by: INTERNAL MEDICINE

## 2021-07-21 PROCEDURE — 77030039961 HC KT CUST COLON BSC -D: Performed by: INTERNAL MEDICINE

## 2021-07-21 PROCEDURE — 2709999900 HC NON-CHARGEABLE SUPPLY: Performed by: INTERNAL MEDICINE

## 2021-07-21 PROCEDURE — 74011250636 HC RX REV CODE- 250/636: Performed by: INTERNAL MEDICINE

## 2021-07-21 RX ORDER — ATORVASTATIN CALCIUM 80 MG/1
80 TABLET, FILM COATED ORAL DAILY
COMMUNITY

## 2021-07-21 RX ORDER — ASPIRIN 325 MG
325 TABLET ORAL DAILY
COMMUNITY

## 2021-07-21 RX ORDER — FLUMAZENIL 0.1 MG/ML
0.2 INJECTION INTRAVENOUS
Status: DISCONTINUED | OUTPATIENT
Start: 2021-07-21 | End: 2021-07-21 | Stop reason: HOSPADM

## 2021-07-21 RX ORDER — MIDAZOLAM HYDROCHLORIDE 1 MG/ML
.25-5 INJECTION, SOLUTION INTRAMUSCULAR; INTRAVENOUS
Status: DISCONTINUED | OUTPATIENT
Start: 2021-07-21 | End: 2021-07-21 | Stop reason: HOSPADM

## 2021-07-21 RX ORDER — AMLODIPINE BESYLATE 10 MG/1
TABLET ORAL DAILY
COMMUNITY

## 2021-07-21 RX ORDER — FENTANYL CITRATE 50 UG/ML
100 INJECTION, SOLUTION INTRAMUSCULAR; INTRAVENOUS
Status: DISCONTINUED | OUTPATIENT
Start: 2021-07-21 | End: 2021-07-21 | Stop reason: HOSPADM

## 2021-07-21 RX ORDER — LOSARTAN POTASSIUM AND HYDROCHLOROTHIAZIDE 12.5; 1 MG/1; MG/1
1 TABLET ORAL DAILY
COMMUNITY

## 2021-07-21 RX ORDER — NALOXONE HYDROCHLORIDE 0.4 MG/ML
0.4 INJECTION, SOLUTION INTRAMUSCULAR; INTRAVENOUS; SUBCUTANEOUS
Status: DISCONTINUED | OUTPATIENT
Start: 2021-07-21 | End: 2021-07-21 | Stop reason: HOSPADM

## 2021-07-21 RX ORDER — ACETAMINOPHEN 325 MG/1
650 TABLET ORAL
COMMUNITY

## 2021-07-21 RX ORDER — CLONIDINE HYDROCHLORIDE 0.1 MG/1
TABLET ORAL 2 TIMES DAILY
COMMUNITY

## 2021-07-21 RX ORDER — CARVEDILOL 25 MG/1
25 TABLET ORAL 2 TIMES DAILY WITH MEALS
COMMUNITY

## 2021-07-21 RX ORDER — SODIUM CHLORIDE 9 MG/ML
1000 INJECTION, SOLUTION INTRAVENOUS CONTINUOUS
Status: DISCONTINUED | OUTPATIENT
Start: 2021-07-21 | End: 2021-07-21 | Stop reason: HOSPADM

## 2021-07-21 RX ADMIN — SODIUM CHLORIDE 1000 ML: 900 INJECTION, SOLUTION INTRAVENOUS at 11:24

## 2021-07-21 NOTE — DISCHARGE INSTRUCTIONS
Daxa Haines  [id number]  1955    COLON DISCHARGE INSTRUCTIONS    Discomfort:  Redness at IV site- apply warm compress to area; if redness or soreness persist- contact your physician  There may be a slight amount of blood passed from the rectum  Gaseous discomfort- walking, belching will help relieve any discomfort  You may not operate a vehicle til the next day. You may not engage in an occupation involving machinery or appliances til the next day. You may not drink alcoholic beverages til the next day. DIET:   High fiber diet. ACTIVITY:  You may not  resume your normal daily activities til the next day. it is recommended that you spend the remainder of the day resting -  avoid any strenuous activity. CALL M.D.  IF ANY SIGN OF:   Increasing pain, nausea, vomiting  Abdominal distension (swelling)  New increased bleeding (oral or rectal)  Fever (chills)  Pain in chest area  Bloody discharge from nose or mouth  Shortness of breath    You may not  take any Advil, Aspirin, Ibuprofen, Motrin, Aleve, or Goodys ONLY  Tylenol as needed for pain. Post procedure diagnosis:  SIGMOID DIVERTICULOSIS;     Follow-up Instructions: Your follow up colonoscopy will be in 10 years.         Eduardo Lynn MD  July 21, 2021

## 2021-07-21 NOTE — H&P
Assessment/Plan  # Detail Type Description    1. Assessment Encounter for screening for cancer of colon (Z12.11). Patient Plan Pt of Dr. Marilou Almanzar, here for 10yr Recall of screening colonoscopy, with last negative exam done approximately 10-12yrs ago, around age 41 at [facility] by Dr. Estela Perez, only mild diverticulosis found per pt. Average risk, no FHx of colon cancer. Asymptomatic of GI complaints. BMI:29.5, BM:1/day. Med Hx: Latent Hx of mini TIA's around 2005. See below for continued Med and Sx Hx. Colonoscopy ordered with Dr. Orlando Coleman @[facility] (Significant comorbidities), Gavilyte bowel prep. Patient is advised that they should take half their aspirin dose (if prescribed) up until the day of procedure. Pt has hypertension, taking daily antihypertensive medications. Hold all AM medications on the day of the procedure, and bring BP med bottles to procedure. Stressed importance of following all bowel preparation instructions. Explained the procedure to the patient including all risks and benefits. These risks consist of missed lesions on exam, bleeding, and bowel perforation with possible need for admission to the hospital, and in the most extensive of  circumstances, the patient may require surgery. Pt verbalized understanding of these risks and is agreeable with this procedure. Plan Orders Further diagnostic evaluations ordered today include(s) DIAGNOSTIC COLONOSCOPY to be performed. He will be scheduled for GASTROENTEROLOGY PROCEDURE, Next Lab Date is within 6 Weeks on 03/03/2021. Clinical information/comments: at location [facility]. The surgeon scheduled is Jt Lloyd MD. An assistant has not been requested. 2. Assessment Multiple myeloma, remission status unspecified (C90.00). Patient Plan Hx of Smoldering multiple myeloma, in remission now.  Managed by Dr. Rizwan Maher at [facility], last seen on 11/17/2020.         3. Assessment History of aortic valve replacement (Z95.2). Patient Plan Hx of chronic dissection of thoracoabdominal aorta, s/p ascending aortic valve repair, and reimplantation (not replacement) done on 2001. Has not seen Cardiologist in this area, he is followed by Dr. Ameena Young at [facility], with whom he had his last visit in 2020, and he has been stable since. He is managed for this and Stage 2 CKD by PCP, Dr. Denise Shelton. Will defer to Dr. Denise Shelton for pre-procedure clearance from cardiac and renal standpoint. Ok to have colonoscopy? Ok to take bowel prep? This 63year old  patient was referred by Parrish Barnes. This 63year old male presents for Colon Cancer Screening. History of Present Illness:  1. Colon Cancer Screening   Prior screening:  colonoscopy. Denies risk factors. Pertinent negatives include abdominal pain, change in bowel habits, change in stool caliber, constipation, decreased appetite, diarrhea, melena, nausea, rectal bleeding, vomiting, weight gain and weight loss. Additional information: No family history of colon cancer and last colonoscopy was done 11 years ago no polyps found. pt had cancer . BM 1x daily.           PROBLEM LIST:     Problem Description Onset Date Chronic Clinical Status Notes   Hx of repair of dissecting thoracic aortic aneurysm, Perfecto type A 2018 N     Essential hypertension 2018 N     Aortic dissection, thoracoabdominal 2018 N     Screening for malignant neoplasm of colon done 2021 N               PAST MEDICAL/SURGICAL HISTORY   (Detailed)    Disease/disorder Onset Date Management Date Comments   Mini TIA's       Renal disease       smoldering multiple myeloma         ascending repair/aortic repair 2001     chronic aortic dissection       CKD 3       Diverticular disease             Family History  (Detailed)  Relationship Family Member Name  Age at Death Condition Onset Age Cause of Death   Father  Y 39 Mother  N  Hypertension  N   Mother  N  Alive and well     Mother  N  Arthritis  N   Sister    Renal disease  N   Sister    Hypertension  N   Sister    Gout  N         Social History:  (Detailed)  Tobacco use reviewed. Preferred language is [place]. EDUCATION/EMPLOYMENT/OCCUPATION  Employment History Status Retired Restrictions     retired       MARITAL STATUS/FAMILY/SOCIAL SUPPORT  Marital status:    CHILDREN  Does not have children. Tobacco use status: Ex-cigarette smoker. Smoking status: Never smoker. TOBACCO SCREENING:  Patient has used tobacco. Patient has not used tobacco in the last 30 days. SMOKING STATUS  Type Smoking Status Usage Per Day Years Used Pack Years Total Pack Years   Cigarette Former smoker         ALCOHOL  There is a history of alcohol use. Type: Beer. 2 beers consumed rarely. CAFFEINE  The patient uses caffeine: soda - 2 cups a day. LIFESTYLE  Moderate activity level. Health club member. Exercise includes weights. Exercises 3-4 times a week. SLEEP PATTERNS  Patient has no changes to sleep patterns. Medications (active prior to today)  Medication Name Sig Description Start Date Stop Date Refilled Rx Elsewhere   amlodipine 10 mg tablet take 1 tablet by oral route  every day //   Y   aspirin 325 mg tablet  //   Y   losartan 100 mg-hydrochlorothiazide 25 mg tablet take 1 tablet by oral route  every day 09/23/2020 09/23/2020 N   carvedilol 3.125 mg tablet take 1 tablet by oral route 2 times every day with food 09/23/2020 09/23/2020 N   clonidine HCl 0.1 mg tablet take 1 tablet by oral route 2 times every day 09/23/2020 09/23/2020 N   atorvastatin 80 mg tablet take 1 tablet by oral route  every day 09/23/2020 09/23/2020 N   fluticasone propionate 50 mcg/actuation nasal spray,suspension 1 spray by Each Nare route as needed. 12/20/2017   Y   aspirin 325 mg tablet,delayed release Take 325 mg by mouth daily.  //   Y     Patient Status   Completed with information received for patient in a summary of care record. Medication Reconciliation  Medications reconciled today. Medication Reviewed  Adherence Medication Name Sig Desc Elsewhere Status   taking as directed amlodipine 10 mg tablet take 1 tablet by oral route  every day Y Verified   taking as directed aspirin 325 mg tablet  Y Verified   taking as directed losartan 100 mg-hydrochlorothiazide 25 mg tablet take 1 tablet by oral route  every day N Verified   taking as directed clonidine HCl 0.1 mg tablet take 1 tablet by oral route 2 times every day N Verified   taking as directed carvedilol 3.125 mg tablet take 1 tablet by oral route 2 times every day with food N Verified   taking as directed aspirin 325 mg tablet,delayed release Take 325 mg by mouth daily. Y Verified   taking as directed fluticasone propionate 50 mcg/actuation nasal spray,suspension 1 spray by Each Nare route as needed. Y Verified   taking as directed atorvastatin 80 mg tablet take 1 tablet by oral route  every day N Verified     Medications (Added, Continued or Stopped today)  Start Date Medication Directions PRN Status PRN Reason Instruction Stop Date    amlodipine 10 mg tablet take 1 tablet by oral route  every day N       aspirin 325 mg tablet  N       aspirin 325 mg tablet,delayed release Take 325 mg by mouth daily. N      09/23/2020 atorvastatin 80 mg tablet take 1 tablet by oral route  every day N      09/23/2020 carvedilol 3.125 mg tablet take 1 tablet by oral route 2 times every day with food N      09/23/2020 clonidine HCl 0.1 mg tablet take 1 tablet by oral route 2 times every day N      12/20/2017 fluticasone propionate 50 mcg/actuation nasal spray,suspension 1 spray by Each Nare route as needed. N      01/28/2021 GaviLyte-G 236 gram-22.74 gram-6.74 gram-5.86 gram oral solution Take as directed.  N      09/23/2020 losartan 100 mg-hydrochlorothiazide 25 mg tablet take 1 tablet by oral route  every day N Allergies:  Ingredient Reaction (Severity) Medication Name Comment   PENICILLINS      Reviewed, no changes. ORDERS:  Status Lab Order Time Frame Comments   ordered DIAGNOSTIC COLONOSCOPY     ordered GASTROENTEROLOGY PROCEDURE within 6 Weeks at location [address] surgeon scheduled is Darshan Russell MD. An assistant has not been requested. Review of System  System Neg/Pos Details   Constitutional Negative Fever, Weight gain and Weight loss. ENMT Negative Sinus Infection. Eyes Negative Double vision. Respiratory Negative Asthma, Chronic cough and Dyspnea. Cardio Negative Chest pain, Edema and Irregular heartbeat/palpitations. GI Negative Abdominal pain, Change in bowel habits, Change in stool caliber, Constipation, Decreased appetite, Diarrhea, Dysphagia, Heartburn, Hematemesis, Hematochezia, Melena, Nausea, Rectal bleeding, Reflux and Vomiting.  Negative Dysuria and Hematuria. Endocrine Negative Cold intolerance and Heat intolerance. Neuro Negative Dizziness, Headache, Numbness and Tremors. Psych Negative Anxiety, Depression and Increased stress. Integumentary Negative Hives, Pruritus and Rash. MS Negative Back pain, Joint pain and Myalgia. Hema/Lymph Negative Easy bleeding, Easy bruising and Lymphadenopathy. Allergic/Immuno Negative Food allergies and Immunosuppression. Vital Signs   Height  Time ft in cm Last Measured Height Position   1:27 PM 5.0 8.00 172.72 01/28/2021 Standing   Vitals (last day)    Date/Time Temp Pulse Resp BP SpO2 Weight [facility]   07/21/21 1052 98.3 °F (36.8 °C) 62 16 135/63 97 % 85 kg (187 lb 4.8 oz) AC       PHYSICAL EXAM:  Exam Findings Details   Constitutional Normal Well developed.    Eyes Normal Conjunctiva - Right: Normal, Left: Normal. Sclera - Right: Normal, Left: Normal.   Nasopharynx Normal Lips/teeth/gums - Normal.   Neck Exam Normal Inspection - Normal.   Respiratory Normal Inspection - Normal.   Cardiovascular Normal Regular rate and rhythm. No murmurs, gallops, or rubs. Vascular Normal Pulses - Brachial: Normal.   Abdomen Normal Inspection - Normal. Appliance(s) - None. Auscultation - Normal. Percussion - Normal. Anterior palpation - No guarding. No abdominal tenderness. No hepatic enlargement. No hernia. No ascites. Skin Normal Inspection - Normal.   Musculoskeletal Normal Hands/Wrist - Right: Normal, Left: Normal.   Extremity Normal No edema. Neurological Normal Fine motor skills - Normal.   Psychiatric Normal Orientation - Oriented to time, place, person & situation. Appropriate mood and affect. Patient Education  # Patient Education   1.  Sigmoidoscopy: What to Expect at [facility] Patient Care Team Members    Name Contact Agency Type Support Role Relationship Active Date Inactive Date Specialty   Parrish Barnes   Patient provider PCP   Family Practice   Tatianna Alex   encounter provider    Gastroenterology     No change in H&P

## 2021-07-21 NOTE — PERIOP NOTES
Reviewed discharge plan of care  With patient and his family member. Written  instructions provided as well.  They also spoke with Dr Sonya Espitia

## 2021-07-21 NOTE — PROCEDURES
[facility]  Colonoscopy Procedure Report  _______________________________________________________  Patient: Mercedes Betancourt                                        Attending Physician: Melody Green MD    Patient ID: [id number]                                    Referring Physician: Marina Crocker MD    Exam Date: 7/21/2021      Introduction: A  63 y.o. male patient, presents for inpatient Colonoscopy    Indications: Pt of Dr. Merlinda Chesterfield, here for 10yr Recall of screening colonoscopy, with last negative exam done approximately 10-12yrs ago, around age 41 at [facility] by Dr. Turner Pendleton, only mild diverticulosis found per pt. Average risk, no FHx of colon cancer. Asymptomatic of GI complaints. BMI:29.5, BM:1/day. Med Hx: Latent Hx of mini TIA's around 2005. See below for continued Med and Sx Hx. Hx of Smoldering multiple myeloma, in remission now. Managed by Dr. Angela Romero at [facility], last seen on 11/17/2020. Hx of chronic dissection of thoracoabdominal aorta, s/p ascending aortic valve repair, and reimplantation (not replacement) done on 2/13/2001. Has not seen Cardiologist in this area, he is followed by Dr. Al Stovall at [facility], with whom he had his last visit in February 2020, and he has been stable since. He is managed for this and Stage 2 CKD by PCP,   Consent: The benefits, risks, and alternatives to the procedure were discussed and informed consent was obtained from the patient. Preparation: EKG, pulse, pulse oximetry and blood pressure were monitored throughout the procedure. ASA Classification: Class II- . The heart is an S1-S2 and regular heart rate and rhythm. Lungs are clear to auscultation and percussion. Abdomen is soft, nondistended, and nontender. Mental Status: awake, alert, and oriented to person, place, and time    Medications:  · Fentanyl 100 mcg IV before procedure. · Versed 2 mg IV, before procedure.     Rectal Exam: Normal Rectal Exam. No Blood. Prostate not enlarged    Pathology Specimens:  0    Procedure: The colonoscope was passed with ease through the anus under direct visualization and advanced to the cecum and 5 cm inside the terminal ileum. Retroflexion is made in the ascending colon. The patient required positioning on the back to aid in the passage of the scope. The scope was withdrawn and the mucosa was carefully examined. The quality of the preparation was excellent. The views were excellent. The patient's toleration of the procedure was excellent. The exam was done twice to the cecum. Total time is 19 minutes and withdrawal time is 15 minutes. Findings:    Rectum:   Tiny internal hemorrhoids. Sigmoid:   Mild sigmoid diverticulosis. Descending Colon:   Normal   Transverse Colon:   Normal   Ascending Colon:   Normal  Cecum:   Normal  Terminal Ileum:   Normal.       Unplanned Events: There were no unplanned events. Estimated Blood Loss: None  IMPLANTS: * No implants in log *  Impressions: Tiny internal hemorrhoids. Slightly tortuous sigmoid colon. Mild sigmoid diverticulosis. Normal Mucosa. No blood, polyps or AVM found. Complications: None; patient tolerated the procedure well. Recommendations:  · Discharge home when standard parameters are met. · Resume a high fiber diet. · Resume own medications. Avoid all NSAID's. · Colonoscopy recommendation in 10 years.     Procedure Codes:     COLONOSCOPY [[id number] (Type: Custom)]    Endoscope Information:  Model Number(s)    [id number]   Assistant: None  Signed By: Anay Kelly MD Date: 7/21/2021

## 2022-05-05 NOTE — ED PROVIDER NOTES
[facility]  eMERGENCY dEPARTMENT eNCOUnter      Pt Name: Iris Dwyer  MRN: [id number]  Wugfvandana 1955  Date of evaluation: 5/12/2019  Provider: Amanda Bearden MD    CHIEF COMPLAINT       Chief Complaint   Patient presents with    Facial Droop     and left leg weakness. sts dx with cva at [facility] on tuesday, seen at [facility] last pm because thought he was getting worse. sts facial droop since cva on tuesday, leg weakness since yesterday         HISTORY OF PRESENT ILLNESS   (Location/Symptom, Timing/Onset,Context/Setting, Quality, Duration, Modifying Factors, Severity)  Note limiting factors. Iris Dwyer is a 66 y.o. male who presents to the emergency department complaining of a left facial droop and left-sided weakness. This patient states he was admitted to an outside facility early this past week for concern for stroke. He reports receiving TPA. He states he was getting ready for discharge Thursday when his symptoms worsen. He was placed back in the hospital and further evaluated. They state that they didn't find any further issues at that time and he was discharged home. He was in his normal state of health last evening around 4 5:00 PM when he was last known to be normal.  He went to bed. He woke up this morning at approximately 8:00 AM and noticed a left-sided facial droop and weakness to his left upper and left lower extremity. No history of trauma. He currently is on a novel oral anticoagulant medication orally. History obtained from patient, his family present, and sister by phone. HPI    NursingNotes were reviewed. REVIEW OF SYSTEMS    (2-9 systems for level 4, 10 or more for level 5)     Review of Systems   Constitutional: Negative for fever. HENT: Negative for rhinorrhea and sore throat. Eyes: Negative for redness. Respiratory: Negative for shortness of breath. Cardiovascular: Negative for chest pain. secretions within the trachea. Possibility of small   volume aspiration is not excluded. No parenchymal abnormality within the   visualized portion of the lungs. Correlation with a dedicated chest   radiograph is recommended. Ectatic ascending thoracic aorta. No evidence of acute dissection within   visualized thoracic aorta. XR CHEST PORTABLE    (Results Pending)         ED BEDSIDE ULTRASOUND:   Performed by ED Physician - none    LABS:  Labs Reviewed   BASIC METABOLIC PANEL W/ REFLEX TO MG FOR LOW K - Abnormal; Notable for the following components:       Result Value    Glucose 117 (*)     CREATININE 0.7 (*)     All other components within normal limits    Narrative:     Performed at:  [facility]  [address] [facility] [address]   Phone [phone]   PROTIME-INR - Abnormal; Notable for the following components:    Protime 15.2 (*)     INR 1.33 (*)     All other components within normal limits    Narrative:     Performed at:  [facility]  [address] [facility] [place] [address]   Phone [phone]   POCT GLUCOSE - Abnormal; Notable for the following components:    POC Glucose 113 (*)     All other components within normal limits    Narrative:     Performed at:  [facility]  [address] [employer] [place] [address]   Phone [phone]   CBC WITH AUTO DIFFERENTIAL    Narrative:     Performed at:  [facility]  [address] [employer] [place] [address]   Phone [phone]   TROPONIN    Narrative:     Performed at:  [address] [place] [address] Laboratory  [address] [place] [address] [place] [address]   Phone [phone]   POCT GLUCOSE       All other labs were within normal range or not returned as of this dictation.     EMERGENCY DEPARTMENT COURSE and DIFFERENTIAL DIAGNOSIS/MDM:   Vitals:    Vitals:    05/12/19 1402 05/12/19 1417 05/12/19 1431 required my urgent intervention. CONSULTS:  None    PROCEDURES:  Unless otherwise noted below, none     Procedures    FINAL IMPRESSION      1. CVA tenderness          DISPOSITION/PLAN   DISPOSITION Decision To Admit 05/12/2019 02:37:52 PM      PATIENT REFERRED TO:  No follow-up provider specified.     DISCHARGE MEDICATIONS:  New Prescriptions    No medications on file          (Please note that portions of this note were completed with a voice recognition program.Efforts were made to edit the dictations but occasionally words are mis-transcribed.)    Paula Phillips MD (electronically signed)  Attending Emergency Physician         Paula Phillips MD  05/12/19 6264 Pf[employer] dose 1 and 2

## 2024-09-25 ENCOUNTER — HOSPITAL ENCOUNTER (OUTPATIENT)
Dept: GENERAL RADIOLOGY | Age: 69
Discharge: HOME | End: 2024-09-25
Attending: SURGERY
Payer: MEDICARE

## 2024-09-25 ENCOUNTER — HOSPITAL ENCOUNTER (OUTPATIENT)
Age: 69
Discharge: HOME | End: 2024-09-25
Payer: MEDICARE

## 2024-09-25 PROCEDURE — 74018 RADEX ABDOMEN 1 VIEW: CPT

## 2024-10-10 ENCOUNTER — TELEPHONE (OUTPATIENT)
Dept: CARDIOLOGY CLINIC | Age: 69
End: 2024-10-10

## 2024-10-10 NOTE — TELEPHONE ENCOUNTER
Pt states he received a letter from [facility] referring him to [facility].    They only gave him procedural codes 70557 and 79469, he did not know why he was being referred to [facility].    Pt is calling the clinic to find out what the diagnosis is so he can be appropriately scheduled.

## 2024-10-18 ENCOUNTER — HOSPITAL ENCOUNTER (OUTPATIENT)
Dept: GENERAL RADIOLOGY | Age: 69
Discharge: HOME | End: 2024-10-18
Attending: SURGERY
Payer: MEDICARE

## 2024-10-18 ENCOUNTER — HOSPITAL ENCOUNTER (OUTPATIENT)
Age: 69
Discharge: HOME | End: 2024-10-18
Payer: MEDICARE

## 2024-10-18 ENCOUNTER — TRANSCRIBE ORDERS (OUTPATIENT)
Dept: GENERAL RADIOLOGY | Age: 69
End: 2024-10-18

## 2024-10-18 PROCEDURE — 74018 RADEX ABDOMEN 1 VIEW: CPT

## 2024-12-03 ENCOUNTER — HOSPITAL ENCOUNTER (OUTPATIENT)
Dept: ULTRASOUND IMAGING | Age: 69
Discharge: HOME | End: 2024-12-03
Attending: SURGERY
Payer: MEDICARE

## 2024-12-03 DIAGNOSIS — N20.0 CALCULUS OF KIDNEY: ICD-10-CM

## 2024-12-03 PROCEDURE — 76770 US EXAM ABDO BACK WALL COMP: CPT

## 2025-01-07 ENCOUNTER — TELEPHONE (OUTPATIENT)
Dept: CARDIOLOGY CLINIC | Age: 70
End: 2025-01-07

## 2025-01-07 NOTE — TELEPHONE ENCOUNTER
Please call [facility] ([phone]) to obtain records that indicate SVT. He has upcoming appointment with FLORES 1/17/2024.     Records were to be faxed but have not been received
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
## 2025-01-07 NOTE — TELEPHONE ENCOUNTER
This information is not available in Epic. Please have patient contact [facility] to have records released

## 2025-01-07 NOTE — TELEPHONE ENCOUNTER
I called for record at [facility] and they need a signed release.  I spoke with pt to let him know,but he stated that all the information is available in his My Chart. I told him that I cannot see his My Chart. He requested that  look in his My Chart for the records.

## 2025-01-17 ENCOUNTER — OFFICE VISIT (OUTPATIENT)
Dept: CARDIOLOGY CLINIC | Age: 70
End: 2025-01-17
Payer: MEDICARE

## 2025-01-17 VITALS
OXYGEN SATURATION: 96 % | DIASTOLIC BLOOD PRESSURE: 64 MMHG | SYSTOLIC BLOOD PRESSURE: 132 MMHG | HEART RATE: 80 BPM | WEIGHT: 168.6 LBS | HEIGHT: 72 IN | BODY MASS INDEX: 22.84 KG/M2

## 2025-01-17 DIAGNOSIS — I49.9 IRREGULAR HEART RATE: ICD-10-CM

## 2025-01-17 DIAGNOSIS — R00.2 PALPITATIONS: Primary | ICD-10-CM

## 2025-01-17 DIAGNOSIS — R94.31 ABNORMAL ELECTROCARDIOGRAM (ECG) (EKG): ICD-10-CM

## 2025-01-17 DIAGNOSIS — I63.9 ACUTE CVA (CEREBROVASCULAR ACCIDENT) (HCC): ICD-10-CM

## 2025-01-17 PROCEDURE — 1159F MED LIST DOCD IN RCRD: CPT | Performed by: INTERNAL MEDICINE

## 2025-01-17 PROCEDURE — 99204 OFFICE O/P NEW MOD 45 MIN: CPT | Performed by: INTERNAL MEDICINE

## 2025-01-17 PROCEDURE — G8427 DOCREV CUR MEDS BY ELIG CLIN: HCPCS | Performed by: INTERNAL MEDICINE

## 2025-01-17 PROCEDURE — 93000 ELECTROCARDIOGRAM COMPLETE: CPT | Performed by: INTERNAL MEDICINE

## 2025-01-17 PROCEDURE — 4004F PT TOBACCO SCREEN RCVD TLK: CPT | Performed by: INTERNAL MEDICINE

## 2025-01-17 PROCEDURE — G8420 CALC BMI NORM PARAMETERS: HCPCS | Performed by: INTERNAL MEDICINE

## 2025-01-17 PROCEDURE — 3017F COLORECTAL CA SCREEN DOC REV: CPT | Performed by: INTERNAL MEDICINE

## 2025-01-17 PROCEDURE — 1123F ACP DISCUSS/DSCN MKR DOCD: CPT | Performed by: INTERNAL MEDICINE

## 2025-01-17 RX ORDER — TAMSULOSIN HYDROCHLORIDE 0.4 MG/1
0.4 CAPSULE ORAL DAILY
COMMUNITY

## 2025-01-26 NOTE — PROGRESS NOTES
Pt resting in bed comfortably. A&O X4. Admitted on 5/15/2019 with right sided CVA. L facial droops and left side weak. Numbness to LLE. VSS. Denies any pain at this time. HR regular. Denies any chest pain, dizziness, or lightheadedness. Lungs sounds clear/diminished to base. Abd soft and nontender. LBM 5/21. Surgical incision to loop recorder site with steri strips intact. Incision CDI. Melatonin given per pts request. Refused fall arm band. Education provided on fall precaution, medication, and skin care. Call light in reach. Patient instructed to call if there is any needs or changes.  Electronically signed by Enedina Verma RN on 5/21/2019 at 10:08 PM Constant observation Was The Patient On Physician Recommended Anticoagulation Therapy?: Please Select the Appropriate Response

## 2025-01-31 ENCOUNTER — HOSPITAL ENCOUNTER (EMERGENCY)
Age: 70
Discharge: HOME | End: 2025-01-31
Attending: INTERNAL MEDICINE
Payer: MEDICARE

## 2025-01-31 ENCOUNTER — TELEPHONE (OUTPATIENT)
Dept: CARDIOLOGY CLINIC | Age: 70
End: 2025-01-31

## 2025-01-31 ENCOUNTER — APPOINTMENT (OUTPATIENT)
Dept: GENERAL RADIOLOGY | Age: 70
End: 2025-01-31
Payer: MEDICARE

## 2025-01-31 VITALS
DIASTOLIC BLOOD PRESSURE: 61 MMHG | RESPIRATION RATE: 18 BRPM | HEIGHT: 72 IN | OXYGEN SATURATION: 93 % | WEIGHT: 169.4 LBS | TEMPERATURE: 97.1 F | SYSTOLIC BLOOD PRESSURE: 101 MMHG | HEART RATE: 67 BPM | BODY MASS INDEX: 22.94 KG/M2

## 2025-01-31 VITALS
BODY MASS INDEX: 22.75 KG/M2 | WEIGHT: 168 LBS | DIASTOLIC BLOOD PRESSURE: 90 MMHG | HEIGHT: 72 IN | SYSTOLIC BLOOD PRESSURE: 139 MMHG

## 2025-01-31 DIAGNOSIS — R94.31 ABNORMAL ELECTROCARDIOGRAM (ECG) (EKG): ICD-10-CM

## 2025-01-31 DIAGNOSIS — R93.1 ABNORMAL ECHOCARDIOGRAM: Primary | ICD-10-CM

## 2025-01-31 DIAGNOSIS — I49.9 IRREGULAR HEART RATE: ICD-10-CM

## 2025-01-31 DIAGNOSIS — I63.9 ACUTE CVA (CEREBROVASCULAR ACCIDENT) (HCC): ICD-10-CM

## 2025-01-31 DIAGNOSIS — E87.6 HYPOKALEMIA: ICD-10-CM

## 2025-01-31 LAB
ALBUMIN SERPL-MCNC: 4 G/DL (ref 3.4–5)
ALBUMIN/GLOB SERPL: 1.3 {RATIO} (ref 1.1–2.2)
ALP SERPL-CCNC: 68 U/L (ref 40–129)
ALT SERPL-CCNC: 12 U/L (ref 10–40)
ANION GAP SERPL CALCULATED.3IONS-SCNC: 12 MMOL/L (ref 3–16)
AST SERPL-CCNC: 19 U/L (ref 15–37)
BASOPHILS # BLD: 0.1 K/UL (ref 0–0.2)
BASOPHILS NFR BLD: 1 %
BILIRUB SERPL-MCNC: <0.2 MG/DL (ref 0–1)
BUN SERPL-MCNC: 11 MG/DL (ref 7–20)
CALCIUM SERPL-MCNC: 9.2 MG/DL (ref 8.3–10.6)
CHLORIDE SERPL-SCNC: 103 MMOL/L (ref 99–110)
CO2 SERPL-SCNC: 26 MMOL/L (ref 21–32)
CREAT SERPL-MCNC: 0.8 MG/DL (ref 0.8–1.3)
CRP SERPL-MCNC: <3 MG/L (ref 0–5.1)
DEPRECATED RDW RBC AUTO: 14.3 % (ref 12.4–15.4)
ECHO AO ASC DIAM: 3.3 CM
ECHO AO ASCENDING AORTA INDEX: 1.67 CM/M2
ECHO AO ROOT DIAM: 4.1 CM
ECHO AO ROOT INDEX: 2.07 CM/M2
ECHO AR MAX VEL PISA: 4.3 M/S
ECHO AV AREA PEAK VELOCITY: 2.5 CM2
ECHO AV AREA VTI: 2.4 CM2
ECHO AV AREA/BSA PEAK VELOCITY: 1.3 CM2/M2
ECHO AV AREA/BSA VTI: 1.2 CM2/M2
ECHO AV MEAN GRADIENT: 4 MMHG
ECHO AV MEAN VELOCITY: 0.9 M/S
ECHO AV PEAK GRADIENT: 8 MMHG
ECHO AV PEAK VELOCITY: 1.4 M/S
ECHO AV REGURGITANT PHT: 1080 MS
ECHO AV VELOCITY RATIO: 0.79
ECHO AV VTI: 33.1 CM
ECHO BSA: 1.97 M2
ECHO EST RA PRESSURE: 3 MMHG
ECHO IVC PROX: 1.7 CM
ECHO LA AREA 2C: 19.2 CM2
ECHO LA AREA 4C: 13.5 CM2
ECHO LA DIAMETER INDEX: 1.46 CM/M2
ECHO LA DIAMETER: 2.9 CM
ECHO LA MAJOR AXIS: 5 CM
ECHO LA MINOR AXIS: 5.4 CM
ECHO LA TO AORTIC ROOT RATIO: 0.71
ECHO LA VOL BP: 40 ML (ref 18–58)
ECHO LA VOL MOD A2C: 55 ML (ref 18–58)
ECHO LA VOL MOD A4C: 28 ML (ref 18–58)
ECHO LA VOL/BSA BIPLANE: 20 ML/M2 (ref 16–34)
ECHO LA VOLUME INDEX MOD A2C: 28 ML/M2 (ref 16–34)
ECHO LA VOLUME INDEX MOD A4C: 14 ML/M2 (ref 16–34)
ECHO LV E' LATERAL VELOCITY: 8.81 CM/S
ECHO LV E' SEPTAL VELOCITY: 4.79 CM/S
ECHO LV EDV 3D: 174 ML
ECHO LV EDV A2C: 134 ML
ECHO LV EDV A4C: 123 ML
ECHO LV EDV INDEX 3D: 88 ML/M2
ECHO LV EDV INDEX A4C: 62 ML/M2
ECHO LV EDV NDEX A2C: 68 ML/M2
ECHO LV EF PHYSICIAN: 53 %
ECHO LV EJECTION FRACTION A2C: 57 %
ECHO LV EJECTION FRACTION A4C: 55 %
ECHO LV ESV 3D: 78 ML
ECHO LV ESV A2C: 58 ML
ECHO LV ESV A4C: 56 ML
ECHO LV ESV INDEX 3D: 39 ML/M2
ECHO LV ESV INDEX A2C: 29 ML/M2
ECHO LV ESV INDEX A4C: 28 ML/M2
ECHO LV FRACTIONAL SHORTENING: 47 % (ref 28–44)
ECHO LV INTERNAL DIMENSION DIASTOLE INDEX: 2.68 CM/M2
ECHO LV INTERNAL DIMENSION DIASTOLIC: 5.3 CM (ref 4.2–5.9)
ECHO LV INTERNAL DIMENSION SYSTOLIC INDEX: 1.41 CM/M2
ECHO LV INTERNAL DIMENSION SYSTOLIC: 2.8 CM
ECHO LV IVSD: 1.3 CM (ref 0.6–1)
ECHO LV MASS 2D: 271.6 G (ref 88–224)
ECHO LV MASS 3D INDEX: 78.3 G/M2
ECHO LV MASS 3D: 155 G
ECHO LV MASS INDEX 2D: 137.2 G/M2 (ref 49–115)
ECHO LV POSTERIOR WALL DIASTOLIC: 1.2 CM (ref 0.6–1)
ECHO LV RELATIVE WALL THICKNESS RATIO: 0.45
ECHO LVOT AREA: 3.1 CM2
ECHO LVOT AV VTI INDEX: 0.75
ECHO LVOT DIAM: 2 CM
ECHO LVOT MEAN GRADIENT: 2 MMHG
ECHO LVOT PEAK GRADIENT: 5 MMHG
ECHO LVOT PEAK VELOCITY: 1.1 M/S
ECHO LVOT STROKE VOLUME INDEX: 39.3 ML/M2
ECHO LVOT SV: 77.9 ML
ECHO LVOT VTI: 24.8 CM
ECHO MV A VELOCITY: 0.58 M/S
ECHO MV AREA VTI: 2.8 CM2
ECHO MV E DECELERATION TIME (DT): 306 MS
ECHO MV E VELOCITY: 0.45 M/S
ECHO MV E/A RATIO: 0.78
ECHO MV E/E' LATERAL: 5.11
ECHO MV E/E' RATIO (AVERAGED): 7.25
ECHO MV E/E' SEPTAL: 9.39
ECHO MV LVOT VTI INDEX: 1.13
ECHO MV MAX VELOCITY: 0.6 M/S
ECHO MV MEAN GRADIENT: 1 MMHG
ECHO MV MEAN VELOCITY: 0.4 M/S
ECHO MV PEAK GRADIENT: 2 MMHG
ECHO MV VTI: 28 CM
ECHO PULMONARY ARTERY END DIASTOLIC PRESSURE: 8 MMHG
ECHO PV MAX VELOCITY: 0.9 M/S
ECHO PV MEAN GRADIENT: 2 MMHG
ECHO PV MEAN VELOCITY: 0.6 M/S
ECHO PV PEAK GRADIENT: 3 MMHG
ECHO PV REGURGITANT MAX VELOCITY: 1.4 M/S
ECHO PV VTI: 22.1 CM
ECHO RA AREA 4C: 17.5 CM2
ECHO RA END SYSTOLIC VOLUME APICAL 4 CHAMBER INDEX BSA: 24 ML/M2
ECHO RA VOLUME: 48 ML
ECHO RIGHT VENTRICULAR SYSTOLIC PRESSURE (RVSP): 39 MMHG
ECHO RV BASAL DIMENSION: 3.9 CM
ECHO RV FREE WALL PEAK S': 9 CM/S
ECHO RV LONGITUDINAL DIMENSION: 8.2 CM
ECHO RV MID DIMENSION: 2.9 CM
ECHO RV TAPSE: 2.2 CM (ref 1.7–?)
ECHO TV REGURGITANT MAX VELOCITY: 3.01 M/S
ECHO TV REGURGITANT PEAK GRADIENT: 36 MMHG
EOSINOPHIL # BLD: 0.2 K/UL (ref 0–0.6)
EOSINOPHIL NFR BLD: 3.3 %
ERYTHROCYTE [SEDIMENTATION RATE] IN BLOOD BY WESTERGREN METHOD: 13 MM/HR (ref 0–20)
GFR SERPLBLD CREATININE-BSD FMLA CKD-EPI: >90 ML/MIN/{1.73_M2}
GLUCOSE SERPL-MCNC: 157 MG/DL (ref 70–99)
HCT VFR BLD AUTO: 41.7 % (ref 40.5–52.5)
HGB BLD-MCNC: 13.8 G/DL (ref 13.5–17.5)
LYMPHOCYTES # BLD: 1.8 K/UL (ref 1–5.1)
LYMPHOCYTES NFR BLD: 32.7 %
MCH RBC QN AUTO: 28.1 PG (ref 26–34)
MCHC RBC AUTO-ENTMCNC: 33.1 G/DL (ref 31–36)
MCV RBC AUTO: 85 FL (ref 80–100)
MONOCYTES # BLD: 0.3 K/UL (ref 0–1.3)
MONOCYTES NFR BLD: 5.6 %
NEUTROPHILS # BLD: 3.2 K/UL (ref 1.7–7.7)
NEUTROPHILS NFR BLD: 57.4 %
NT-PROBNP SERPL-MCNC: 37 PG/ML (ref 0–124)
PLATELET # BLD AUTO: 267 K/UL (ref 135–450)
PMV BLD AUTO: 7.1 FL (ref 5–10.5)
POTASSIUM SERPL-SCNC: 3.3 MMOL/L (ref 3.5–5.1)
PROT SERPL-MCNC: 7.1 G/DL (ref 6.4–8.2)
RBC # BLD AUTO: 4.9 M/UL (ref 4.2–5.9)
SODIUM SERPL-SCNC: 141 MMOL/L (ref 136–145)
TROPONIN, HIGH SENSITIVITY: 8 NG/L (ref 0–22)
WBC # BLD AUTO: 5.5 K/UL (ref 4–11)

## 2025-01-31 PROCEDURE — 99285 EMERGENCY DEPT VISIT HI MDM: CPT

## 2025-01-31 PROCEDURE — 87040 BLOOD CULTURE FOR BACTERIA: CPT

## 2025-01-31 PROCEDURE — 83880 ASSAY OF NATRIURETIC PEPTIDE: CPT

## 2025-01-31 PROCEDURE — 76376 3D RENDER W/INTRP POSTPROCES: CPT | Performed by: INTERNAL MEDICINE

## 2025-01-31 PROCEDURE — 86140 C-REACTIVE PROTEIN: CPT

## 2025-01-31 PROCEDURE — 93306 TTE W/DOPPLER COMPLETE: CPT

## 2025-01-31 PROCEDURE — 93005 ELECTROCARDIOGRAM TRACING: CPT | Performed by: PHYSICIAN ASSISTANT

## 2025-01-31 PROCEDURE — 85025 COMPLETE CBC W/AUTO DIFF WBC: CPT

## 2025-01-31 PROCEDURE — 93306 TTE W/DOPPLER COMPLETE: CPT | Performed by: INTERNAL MEDICINE

## 2025-01-31 PROCEDURE — 84484 ASSAY OF TROPONIN QUANT: CPT

## 2025-01-31 PROCEDURE — 71045 X-RAY EXAM CHEST 1 VIEW: CPT

## 2025-01-31 PROCEDURE — 80053 COMPREHEN METABOLIC PANEL: CPT

## 2025-01-31 PROCEDURE — 6370000000 HC RX 637 (ALT 250 FOR IP): Performed by: PHYSICIAN ASSISTANT

## 2025-01-31 PROCEDURE — 85652 RBC SED RATE AUTOMATED: CPT

## 2025-01-31 RX ORDER — POTASSIUM CHLORIDE 1500 MG/1
40 TABLET, EXTENDED RELEASE ORAL ONCE
Status: COMPLETED | OUTPATIENT
Start: 2025-01-31 | End: 2025-01-31

## 2025-01-31 RX ADMIN — POTASSIUM CHLORIDE 40 MEQ: 1500 TABLET, EXTENDED RELEASE ORAL at 19:13

## 2025-01-31 ASSESSMENT — ENCOUNTER SYMPTOMS
DIARRHEA: 0
SHORTNESS OF BREATH: 0
RHINORRHEA: 0
VOMITING: 0
COUGH: 0
NAUSEA: 0
ABDOMINAL PAIN: 0
WHEEZING: 0

## 2025-01-31 ASSESSMENT — LIFESTYLE VARIABLES: HOW OFTEN DO YOU HAVE A DRINK CONTAINING ALCOHOL: 2-3 TIMES A WEEK

## 2025-01-31 ASSESSMENT — PAIN - FUNCTIONAL ASSESSMENT: PAIN_FUNCTIONAL_ASSESSMENT: NONE - DENIES PAIN

## 2025-01-31 NOTE — TELEPHONE ENCOUNTER
Spoke with the patient regarding recent concerning echo findings. Per MKW, he would like patient to have blood cultures, ESR, and CRP completed. Patient voiced understanding regarding findings and is coming to ED for further workup. Call placed to ED charge regarding this patient.

## 2025-01-31 NOTE — ED PROVIDER NOTES
Heart sounds: Murmur heard.   Pulmonary:      Effort: Pulmonary effort is normal. No respiratory distress.      Breath sounds: Normal breath sounds.   Chest:      Chest wall: No tenderness.   Abdominal:      General: Bowel sounds are normal. There is no distension.      Palpations: Abdomen is soft.      Tenderness: There is no abdominal tenderness.   Musculoskeletal:         General: Normal range of motion.      Cervical back: Normal range of motion and neck supple.   Skin:     General: Skin is warm and dry.   Neurological:      Mental Status: He is alert and oriented to person, place, and time.   Psychiatric:         Behavior: Behavior normal.           DIAGNOSTIC RESULTS   LABS:    Labs Reviewed   COMPREHENSIVE METABOLIC PANEL - Abnormal; Notable for the following components:       Result Value    Potassium 3.3 (*)     Glucose 157 (*)     All other components within normal limits   CULTURE, BLOOD 1   CULTURE, BLOOD 2   CBC WITH AUTO DIFFERENTIAL   TROPONIN   BRAIN NATRIURETIC PEPTIDE   SEDIMENTATION RATE   C-REACTIVE PROTEIN       When ordered only abnormal lab results are displayed. All other labs were within normal range or not returned as of this dictation.    EKG: When ordered, EKG's are interpreted by the Emergency Department Physician in the absence of a cardiologist.  Please see their note for interpretation of EKG.    RADIOLOGY:   Non-plain film images such as CT, Ultrasound and MRI are read by the radiologist. Plain radiographic images are visualized and preliminarily interpreted by the ED Provider with the below findings:    Negative     Interpretation per the Radiologist below, if available at the time of this note:    XR CHEST PORTABLE   Final Result   No acute process.           Echo (TTE) complete (PRN contrast/bubble/strain/3D)    Result Date: 1/31/2025    Left Ventricle: Normal left ventricular systolic function with a visually estimated EF of 55 - 60%. Left ventricle size is normal. Mildly  increased wall thickness. Findings consistent with mild concentric hypertrophy. Normal wall motion. Normal diastolic function.   Right Ventricle: Right ventricle size is normal. Normal systolic function. TAPSE is normal. Normal wall motion.   Tricuspid Valve: Severely thickened leaflets. There is a possible medium sized fixed vegetation on the anterior leaflet. Findings may represent thickened chordal aparatus. Consider CARLO for further assessment if indicated. Moderate to severe regurgitation. Est. RA Pressure is 3 mmHg. RVSP is 39 mmHg.   Aortic Valve: Mild regurgitation with a centrally directed jet.   Pulmonic Valve: Mild regurgitation with a centrally directed jet.   Mitral Valve: Mild to moderate regurgitation with an anterior directed jet.   Interatrial Septum: Agitated saline study was negative with and without provocation.   Aorta: Mildly dilated aortic root. Ao root diameter is 4.1 cm.       No results found.    PROCEDURES   Unless otherwise noted below, none     Procedures    CRITICAL CARE TIME (.cctime)       PAST MEDICAL HISTORY      has a past medical history of Cerebral artery occlusion with cerebral infarction (HCC).     EMERGENCY DEPARTMENT COURSE and DIFFERENTIAL DIAGNOSIS/MDM:   Vitals:    Vitals:    01/31/25 1745   BP: 101/61   Pulse: 67   Resp: 18   Temp: 97.1 °F (36.2 °C)   TempSrc: Oral   SpO2: 93%   Weight: 76.8 kg (169 lb 6.4 oz)   Height: 1.829 m (6')       Patient was given the following medications:  Medications   potassium chloride (KLOR-CON M) extended release tablet 40 mEq (has no administration in time range)             Is this patient to be included in the SEP-1 Core Measure due to severe sepsis or septic shock?   No   Exclusion criteria - the patient is NOT to be included for SEP-1 Core Measure due to:  2+ SIRS criteria are not met    Chronic Conditions affecting care:    has a past medical history of Cerebral artery occlusion with cerebral infarction (HCC).    CONSULTS: (Who and  What was discussed)  IP CONSULT TO CARDIOLOGY      Social Determinants Significantly Affecting Health : None    Records Reviewed (External and Source) echo results from today    CC/HPI Summary, DDx, ED Course, and Reassessment: Patient presents for evaluation of abnormal echo.  On exam, he is resting comfortably in bed no acute distress and nontoxic.  Vitals are within normal limits and he is afebrile.  Lungs are clear to auscultation bilaterally.  Abdomen is soft, nontender.  He will be reevaluated.  Please see attending note for EKG interpretation.    Disposition Considerations (tests considered but not done, Admit vs D/C, Shared Decision Making, Pt Expectation of Test or Tx.): CBC CMP are relatively unremarkable aside from mild hypokalemia.  Given oral supplementation in the ED.  Troponin is 8, BNP 37.  Chest x-ray is negative.  Blood cultures and inflammatory markers are pending.  I did consult with cardiology, Dr. Bass, who states patient is stable for discharge and they will resume care outpatient.    The patient has been evaluated and the history and physical exam suggest a benign etiology. I see nothing to suggest acute coronary syndrome, myocardial infarction, pulmonary embolism, thoracic aortic dissection, significant pericarditis, pneumonia, pneumothorax, or acute abdomen.  I feel the patient can be safely discharged to home with outpatient follow up.  Instructions have been given for the patient to return to the Emergency Department for any worsening of the symptoms, including but not limited to increased pain, shortness of breath, abdominal pain or weakness.  Stable for discharge.        I am the Primary Clinician of Record.  FINAL IMPRESSION      1. Abnormal echocardiogram    2. Hypokalemia          DISPOSITION/PLAN     DISPOSITION Decision To Discharge 01/31/2025 07:00:43 PM   DISPOSITION CONDITION Stable           PATIENT REFERRED TO:  Tray Woods MD  [address] [facility] [address]  [facility]

## 2025-01-31 NOTE — ED PROVIDER NOTES
In addition to the advanced practice provider, I personally saw Nick HEREDIA Avel and performed a substantive portion of the visit including all aspects of the medical decision making.    Medical Decision Making  69-year-old male presents the ER for abnormal echo results today.  Patient was sent in by cardiology.  Patient denies any IV drug use.  The echo did show a vegetation on the leaflet of the valve.  Patient denies chest pain shortness of breath.  Patient denies palpitations and dizziness.  He denies any generalized weakness.  Potassium was found to be 3.3 and replacement started in the ER.  Chest x-ray did not show any acute findings.  The PA did contact cardiology to review the results and they are okay with the patient being discharged.  Patient is not febrile or toxic in appearance.  Patient is asymptomatic.  Patient is stable for discharge.  I agree with the assessment plan of the PA        EKG  The Ekg interpreted by me in the absence of a cardiologist shows.  normal sinus rhythm with a rate of 62  Axis is   Normal  QTc is  normal  Intervals and Durations are unremarkable.      Positive for slight ST elevation in leads V3, V4 and V5.  There is slight upward rounding of those leads as well.  No evidence of acute ischemia.   No significant change from prior EKG dated 1/17/2025    SEP-1  Is this patient to be included in the SEP-1 Core Measure due to severe sepsis or septic shock?   No    Screenings     [place] Coma Scale  Eye Opening: Spontaneous  Best Verbal Response: Oriented  Best Motor Response: Obeys commands  [place] Coma Scale Score: 15             Patient Referrals:  Tray Woods MD  [address] [facility] [address]  [facility] [address]  [phone]    Schedule an appointment as soon as possible for a visit       [facility] Emergency Department  [address] [address]  [phone]  Go to   If symptoms worsen      Discharge Medications:  Discharge Medication List as of 1/31/2025  7:13 PM           FINAL IMPRESSION  1. Abnormal echocardiogram    2. Hypokalemia        Blood pressure 101/61, pulse 67, temperature 97.1 °F (36.2 °C), temperature source Oral, resp. rate 18, height 1.829 m (6'), weight 76.8 kg (169 lb 6.4 oz), SpO2 93%.     I appreciate the SHASTA's collaboration on this case.    For further details of Nick HEREDIA Avel's emergency department encounter, please see documentation by advanced practice provider, Saskia Bell.        Glynn Peters DO  01/31/25 3358

## 2025-02-01 LAB
BACTERIA BLD CULT ORG #2: NORMAL
BACTERIA BLD CULT: NORMAL
EKG ATRIAL RATE: 62 BPM
EKG DIAGNOSIS: NORMAL
EKG P AXIS: 71 DEGREES
EKG P-R INTERVAL: 174 MS
EKG Q-T INTERVAL: 432 MS
EKG QRS DURATION: 130 MS
EKG QTC CALCULATION (BAZETT): 438 MS
EKG R AXIS: -68 DEGREES
EKG T AXIS: 52 DEGREES
EKG VENTRICULAR RATE: 62 BPM

## 2025-02-01 PROCEDURE — 93010 ELECTROCARDIOGRAM REPORT: CPT | Performed by: INTERNAL MEDICINE

## 2025-02-04 ENCOUNTER — TELEPHONE (OUTPATIENT)
Dept: CARDIOLOGY CLINIC | Age: 70
End: 2025-02-04

## 2025-02-04 LAB
BACTERIA BLD CULT ORG #2: NORMAL
BACTERIA BLD CULT: NORMAL

## 2025-02-04 NOTE — TELEPHONE ENCOUNTER
Pt called to request his results from his chest x-ray and echo and what's his next step.  Please call to discuss his concerns.  Thank you

## 2025-02-04 NOTE — TELEPHONE ENCOUNTER
Called patient to schedule an appt next available with Dr. Turner and patient requested that Dr. Woods call patient regarding why he needs to see Dr. Turner. Please call patient and advise.

## 2025-02-05 NOTE — TELEPHONE ENCOUNTER
Called and spoke with the patient regarding message below. All questions answered. He is agreeable to schedule appointment. This has been scheduled. Call complete

## 2025-02-18 NOTE — PROGRESS NOTES
01/31/2025 06:07 PM    K 3.3 01/31/2025 06:07 PM    K 4.4 05/30/2019 06:50 AM     01/31/2025 06:07 PM    CO2 26 01/31/2025 06:07 PM    BUN 11 01/31/2025 06:07 PM    CREATININE 0.8 01/31/2025 06:07 PM    GFRAA >60 05/30/2019 06:50 AM    AGRATIO 1.3 01/31/2025 06:07 PM    LABGLOM >90 01/31/2025 06:07 PM    GLUCOSE 157 01/31/2025 06:07 PM    CALCIUM 9.2 01/31/2025 06:07 PM    BILITOT <0.2 01/31/2025 06:07 PM    ALKPHOS 68 01/31/2025 06:07 PM    AST 19 01/31/2025 06:07 PM    ALT 12 01/31/2025 06:07 PM     PT/INR:  No results found for: \"PTINR\"  Lab Results   Component Value Date    TROPONINI <0.01 05/12/2019       EKG  02/01/25  Sinus Rhythm  RBBB    Echo  01/31/25    Left Ventricle: Normal left ventricular systolic function with a visually estimated EF of 55 - 60%. Left ventricle size is normal. Mildly increased wall thickness. Findings consistent with mild concentric hypertrophy. Normal wall motion. Normal diastolic function.    Right Ventricle: Right ventricle size is normal. Normal systolic function. TAPSE is normal. Normal wall motion.    Tricuspid Valve: Severely thickened leaflets. There is a possible medium sized fixed vegetation on the anterior leaflet. Findings may represent thickened chordal aparatus. Consider CARLO for further assessment if indicated. Moderate to severe regurgitation. Est. RA Pressure is 3 mmHg. RVSP is 39 mmHg.    Aortic Valve: Mild regurgitation with a centrally directed jet.    Pulmonic Valve: Mild regurgitation with a centrally directed jet.    Mitral Valve: Mild to moderate regurgitation with an anterior directed jet.    Interatrial Septum: Agitated saline study was negative with and without provocation.    Aorta: Mildly dilated aortic root. Ao root diameter is 4.1 cm.    Echo  05/14/19  Summary   Normal left ventricle size, wall thickness and systolic function with an   estimated ejection fraction of 55%. No regional wall motion abnormalities   are seen.   Normal right ventricular

## 2025-02-21 ENCOUNTER — OFFICE VISIT (OUTPATIENT)
Dept: CARDIOLOGY CLINIC | Age: 70
End: 2025-02-21
Payer: MEDICARE

## 2025-02-21 ENCOUNTER — TELEPHONE (OUTPATIENT)
Dept: CARDIOLOGY CLINIC | Age: 70
End: 2025-02-21

## 2025-02-21 VITALS
DIASTOLIC BLOOD PRESSURE: 86 MMHG | OXYGEN SATURATION: 99 % | HEART RATE: 89 BPM | HEIGHT: 72 IN | WEIGHT: 169 LBS | SYSTOLIC BLOOD PRESSURE: 126 MMHG | BODY MASS INDEX: 22.89 KG/M2

## 2025-02-21 DIAGNOSIS — R94.31 ABNORMAL ELECTROCARDIOGRAM (ECG) (EKG): ICD-10-CM

## 2025-02-21 DIAGNOSIS — I63.9 ACUTE CVA (CEREBROVASCULAR ACCIDENT) (HCC): ICD-10-CM

## 2025-02-21 DIAGNOSIS — I33.0 TRICUSPID VALVE VEGETATION: ICD-10-CM

## 2025-02-21 DIAGNOSIS — I36.1 NONRHEUMATIC TRICUSPID VALVE REGURGITATION: Primary | ICD-10-CM

## 2025-02-21 PROCEDURE — 1159F MED LIST DOCD IN RCRD: CPT | Performed by: INTERNAL MEDICINE

## 2025-02-21 PROCEDURE — 1123F ACP DISCUSS/DSCN MKR DOCD: CPT | Performed by: INTERNAL MEDICINE

## 2025-02-21 PROCEDURE — 1036F TOBACCO NON-USER: CPT | Performed by: INTERNAL MEDICINE

## 2025-02-21 PROCEDURE — 3017F COLORECTAL CA SCREEN DOC REV: CPT | Performed by: INTERNAL MEDICINE

## 2025-02-21 PROCEDURE — G2211 COMPLEX E/M VISIT ADD ON: HCPCS | Performed by: INTERNAL MEDICINE

## 2025-02-21 PROCEDURE — G8420 CALC BMI NORM PARAMETERS: HCPCS | Performed by: INTERNAL MEDICINE

## 2025-02-21 PROCEDURE — 99204 OFFICE O/P NEW MOD 45 MIN: CPT | Performed by: INTERNAL MEDICINE

## 2025-02-21 PROCEDURE — G8427 DOCREV CUR MEDS BY ELIG CLIN: HCPCS | Performed by: INTERNAL MEDICINE

## 2025-02-21 NOTE — TELEPHONE ENCOUNTER
Can you please schedule a CARLO w/ anesthesia with Dr. Turner?    Transesophageal Echocardiogram Patient Pre-Procedure Instructions    Do NOT eat or drink anything after midnight morning of procedure.     Transportation: Bring someone to drive you home.     Scheduling: Nicolle and Araseli are our full time procedure schedulers. They will call you to schedule your procedure.    Reviewed during visit today. Thank you!

## 2025-02-21 NOTE — PATIENT INSTRUCTIONS
- Schedule an exercise stress test    Transesophageal Echocardiogram Patient Pre-Procedure Instructions    Do NOT eat or drink anything after midnight morning of procedure.     Transportation: Bring someone to drive you home.     Scheduling: Nirmala are our full time procedure schedulers. They will call you to schedule your procedure.

## 2025-02-24 NOTE — TELEPHONE ENCOUNTER
Date of Procedure: Friday 3/7/25 @ [facility] with Dr. Turner      Time of arrival: 7:30 am     Procedure time: 8:30 am     Called and spoke to Nick and he is agreeable to date and time. Reviewed instructions and he verbalized understanding. Encouraged to call with any questions or concerns.     Published on [employer], scheduled in Epic and e-mailed to cath lab.

## 2025-03-05 ENCOUNTER — HOSPITAL ENCOUNTER (OUTPATIENT)
Age: 70
Discharge: HOME | End: 2025-03-07
Attending: INTERNAL MEDICINE
Payer: MEDICARE

## 2025-03-05 DIAGNOSIS — I36.1 NONRHEUMATIC TRICUSPID VALVE REGURGITATION: ICD-10-CM

## 2025-03-05 DIAGNOSIS — R94.31 ABNORMAL ELECTROCARDIOGRAM (ECG) (EKG): ICD-10-CM

## 2025-03-05 DIAGNOSIS — I33.0 TRICUSPID VALVE VEGETATION: ICD-10-CM

## 2025-03-05 LAB
STRESS BASELINE DIAS BP: 98 MMHG
STRESS BASELINE HR: 60 BPM
STRESS BASELINE SYS BP: 142 MMHG
STRESS ESTIMATED WORKLOAD: 4.3 METS
STRESS EXERCISE DUR MIN: 1 MIN
STRESS EXERCISE DUR SEC: 25 SEC
STRESS O2 SAT PEAK: 98 %
STRESS O2 SAT REST: 98 %
STRESS PEAK DIAS BP: 99 MMHG
STRESS PEAK SYS BP: 197 MMHG
STRESS PERCENT HR ACHIEVED: 88 %
STRESS POST PEAK HR: 134 BPM
STRESS RATE PRESSURE PRODUCT: NORMAL BPM*MMHG
STRESS TARGET HR: 153 BPM

## 2025-03-05 PROCEDURE — 93017 CV STRESS TEST TRACING ONLY: CPT

## 2025-03-07 ENCOUNTER — HOSPITAL ENCOUNTER (OUTPATIENT)
Age: 70
Discharge: HOME | End: 2025-03-09
Attending: INTERNAL MEDICINE
Payer: MEDICARE

## 2025-03-07 ENCOUNTER — ANESTHESIA EVENT (OUTPATIENT)
Age: 70
End: 2025-03-07
Payer: MEDICARE

## 2025-03-07 ENCOUNTER — ANESTHESIA (OUTPATIENT)
Age: 70
End: 2025-03-07
Payer: MEDICARE

## 2025-03-07 VITALS
RESPIRATION RATE: 17 BRPM | WEIGHT: 169 LBS | OXYGEN SATURATION: 100 % | HEIGHT: 72 IN | HEART RATE: 52 BPM | TEMPERATURE: 98.4 F | DIASTOLIC BLOOD PRESSURE: 92 MMHG | BODY MASS INDEX: 22.89 KG/M2 | SYSTOLIC BLOOD PRESSURE: 139 MMHG

## 2025-03-07 PROBLEM — I33.0 TRICUSPID VALVE VEGETATION: Status: ACTIVE | Noted: 2025-03-07

## 2025-03-07 PROBLEM — I36.1 NONRHEUMATIC TRICUSPID VALVE REGURGITATION: Status: ACTIVE | Noted: 2025-03-07

## 2025-03-07 LAB — ECHO BSA: 1.97 M2

## 2025-03-07 PROCEDURE — 93325 DOPPLER ECHO COLOR FLOW MAPG: CPT | Performed by: INTERNAL MEDICINE

## 2025-03-07 PROCEDURE — 93320 DOPPLER ECHO COMPLETE: CPT | Performed by: INTERNAL MEDICINE

## 2025-03-07 PROCEDURE — 99152 MOD SED SAME PHYS/QHP 5/>YRS: CPT | Performed by: INTERNAL MEDICINE

## 2025-03-07 PROCEDURE — 2580000003 HC RX 258: Performed by: REGISTERED NURSE

## 2025-03-07 PROCEDURE — 7100000010 HC PHASE II RECOVERY - FIRST 15 MIN: Performed by: INTERNAL MEDICINE

## 2025-03-07 PROCEDURE — 3700000001 HC ADD 15 MINUTES (ANESTHESIA): Performed by: INTERNAL MEDICINE

## 2025-03-07 PROCEDURE — 3700000000 HC ANESTHESIA ATTENDED CARE: Performed by: INTERNAL MEDICINE

## 2025-03-07 PROCEDURE — 93312 ECHO TRANSESOPHAGEAL: CPT | Performed by: INTERNAL MEDICINE

## 2025-03-07 PROCEDURE — 93312 ECHO TRANSESOPHAGEAL: CPT

## 2025-03-07 PROCEDURE — 6360000002 HC RX W HCPCS: Performed by: REGISTERED NURSE

## 2025-03-07 PROCEDURE — 6370000000 HC RX 637 (ALT 250 FOR IP): Performed by: INTERNAL MEDICINE

## 2025-03-07 RX ORDER — PROPOFOL 10 MG/ML
INJECTION, EMULSION INTRAVENOUS
Status: DISCONTINUED | OUTPATIENT
Start: 2025-03-07 | End: 2025-03-07 | Stop reason: SDUPTHER

## 2025-03-07 RX ORDER — HYDROMORPHONE HYDROCHLORIDE 2 MG/ML
0.5 INJECTION, SOLUTION INTRAMUSCULAR; INTRAVENOUS; SUBCUTANEOUS EVERY 5 MIN PRN
Status: CANCELLED | OUTPATIENT
Start: 2025-03-07

## 2025-03-07 RX ORDER — SODIUM CHLORIDE 0.9 % (FLUSH) 0.9 %
5-40 SYRINGE (ML) INJECTION EVERY 12 HOURS SCHEDULED
Status: CANCELLED | OUTPATIENT
Start: 2025-03-07

## 2025-03-07 RX ORDER — NALOXONE HYDROCHLORIDE 0.4 MG/ML
INJECTION, SOLUTION INTRAMUSCULAR; INTRAVENOUS; SUBCUTANEOUS PRN
Status: CANCELLED | OUTPATIENT
Start: 2025-03-07

## 2025-03-07 RX ORDER — OXYCODONE HYDROCHLORIDE 5 MG/1
10 TABLET ORAL PRN
Status: CANCELLED | OUTPATIENT
Start: 2025-03-07 | End: 2025-03-07

## 2025-03-07 RX ORDER — SODIUM CHLORIDE 0.9 % (FLUSH) 0.9 %
5-40 SYRINGE (ML) INJECTION PRN
Status: CANCELLED | OUTPATIENT
Start: 2025-03-07

## 2025-03-07 RX ORDER — FENTANYL CITRATE 50 UG/ML
25 INJECTION, SOLUTION INTRAMUSCULAR; INTRAVENOUS EVERY 5 MIN PRN
Status: CANCELLED | OUTPATIENT
Start: 2025-03-07

## 2025-03-07 RX ORDER — OXYCODONE HYDROCHLORIDE 5 MG/1
5 TABLET ORAL PRN
Status: CANCELLED | OUTPATIENT
Start: 2025-03-07 | End: 2025-03-07

## 2025-03-07 RX ORDER — SODIUM CHLORIDE 9 MG/ML
INJECTION, SOLUTION INTRAVENOUS PRN
Status: CANCELLED | OUTPATIENT
Start: 2025-03-07

## 2025-03-07 RX ORDER — SODIUM CHLORIDE 9 MG/ML
INJECTION, SOLUTION INTRAVENOUS
Status: DISCONTINUED | OUTPATIENT
Start: 2025-03-07 | End: 2025-03-07 | Stop reason: SDUPTHER

## 2025-03-07 RX ORDER — METOCLOPRAMIDE HYDROCHLORIDE 5 MG/ML
10 INJECTION INTRAMUSCULAR; INTRAVENOUS
Status: CANCELLED | OUTPATIENT
Start: 2025-03-07 | End: 2025-03-08

## 2025-03-07 RX ORDER — ONDANSETRON 2 MG/ML
4 INJECTION INTRAMUSCULAR; INTRAVENOUS
Status: CANCELLED | OUTPATIENT
Start: 2025-03-07 | End: 2025-03-08

## 2025-03-07 RX ORDER — LIDOCAINE HYDROCHLORIDE 20 MG/ML
INJECTION, SOLUTION EPIDURAL; INFILTRATION; INTRACAUDAL; PERINEURAL
Status: DISCONTINUED | OUTPATIENT
Start: 2025-03-07 | End: 2025-03-07 | Stop reason: SDUPTHER

## 2025-03-07 RX ADMIN — PROPOFOL 20 MG: 10 INJECTION, EMULSION INTRAVENOUS at 13:07

## 2025-03-07 RX ADMIN — PROPOFOL 20 MG: 10 INJECTION, EMULSION INTRAVENOUS at 13:09

## 2025-03-07 RX ADMIN — PROPOFOL 50 MG: 10 INJECTION, EMULSION INTRAVENOUS at 13:00

## 2025-03-07 RX ADMIN — PROPOFOL 20 MG: 10 INJECTION, EMULSION INTRAVENOUS at 13:11

## 2025-03-07 RX ADMIN — PROPOFOL 30 MG: 10 INJECTION, EMULSION INTRAVENOUS at 13:02

## 2025-03-07 RX ADMIN — BENZOCAINE 2 EACH: 200 SPRAY DENTAL; ORAL; PERIODONTAL at 12:53

## 2025-03-07 RX ADMIN — LIDOCAINE HYDROCHLORIDE 50 MG: 20 INJECTION, SOLUTION EPIDURAL; INFILTRATION; INTRACAUDAL; PERINEURAL at 13:00

## 2025-03-07 RX ADMIN — SODIUM CHLORIDE: 9 INJECTION, SOLUTION INTRAVENOUS at 12:59

## 2025-03-07 RX ADMIN — PROPOFOL 20 MG: 10 INJECTION, EMULSION INTRAVENOUS at 13:04

## 2025-03-07 NOTE — H&P
[facility]  Cardiology Note  [phone]    History of Present Illness:  Nick Vallecillo is a 69 y.o. patient who presents for CARLO for TR    Past Medical History:   has a past medical history of Cerebral artery occlusion with cerebral infarction (HCC) and Hypertension.    Surgical History:   has a past surgical history that includes Wrist fracture surgery (Left, 1979).     Social History:   reports that he has quit smoking. His smoking use included cigarettes. He has been exposed to tobacco smoke. He has never used smokeless tobacco. He reports current alcohol use of about 6.0 standard drinks of alcohol per week. He reports current drug use. Drug: Marijuana (Weed).     Family History:  No family history on file.    Home Medications:  Were reviewed and are listed in nursing record. and/or listed below  Prior to Admission medications    Medication Sig Start Date End Date Taking? Authorizing Provider   aspirin 81 MG chewable tablet Take 1 tablet by mouth daily 5/31/19  Yes Mary Franklin MD   atorvastatin (LIPITOR) 80 MG tablet Take 1 tablet by mouth daily 5/31/19  Yes Mary Franklin MD   metoprolol tartrate (LOPRESSOR) 25 MG tablet Take 1 tablet by mouth 2 times daily  Patient taking differently: Take 1 tablet by mouth daily 5/31/19  Yes Mary Franklin MD   amLODIPine (NORVASC) 10 MG tablet Take 1 tablet by mouth daily 5/31/19  Yes Mary Franklin MD   clopidogrel (PLAVIX) 75 MG tablet Take 1 tablet by mouth daily 5/31/19  Yes Mary Franklin MD          Allergies:  Patient has no known allergies.     Review of Systems:   14 point ROS negative unless otherwise noted in HPI    Physical Examination:    Vitals:    03/07/25 1407   BP: (!) 139/92   Pulse: 52   Resp: 17   Temp:    SpO2: 100%    Weight - Scale: 76.7 kg (169 lb)       GEN:   AxO x3  ENT:   normal ocular range of motion  LUNGS:  clear bilaterally  CV:   Regular rate and rhythm  Normal S1/S2  No rubs, murmurs, gallops  No

## 2025-03-07 NOTE — DISCHARGE INSTRUCTIONS
CARLO DISCHARGE INSTRUCTIONS    No driving for 24 hours. We strongly recommend that a responsible adult stay with you for the next 24 hours.    Continue Medications.    Advance diet as tolerated    Contact physician office  for following symptoms:  Fever  Difficulty swallowing  Chest pain  Difficulty breathing  Bleeding

## 2025-03-07 NOTE — ANESTHESIA PRE PROCEDURE
Department of Anesthesiology  Preprocedure Note       Name:  Nick Vallecillo   Age:  69 y.o.  :  1955                                          MRN:  [id number]         Date:  3/7/2025      Surgeon: Surgeon(s):  Luiz Turner MD    Procedure: * No procedures listed *    Medications prior to admission:   Prior to Admission medications    Medication Sig Start Date End Date Taking? Authorizing Provider   aspirin 81 MG chewable tablet Take 1 tablet by mouth daily 19  Yes Mary Franklin MD   atorvastatin (LIPITOR) 80 MG tablet Take 1 tablet by mouth daily 19  Yes Mary Franklin MD   metoprolol tartrate (LOPRESSOR) 25 MG tablet Take 1 tablet by mouth 2 times daily  Patient taking differently: Take 1 tablet by mouth daily 19  Yes Mary Franklin MD   amLODIPine (NORVASC) 10 MG tablet Take 1 tablet by mouth daily 19  Yes Mary Franklin MD   clopidogrel (PLAVIX) 75 MG tablet Take 1 tablet by mouth daily 19  Yes Mary Franklin MD       Current medications:    Current Outpatient Medications   Medication Sig Dispense Refill   • aspirin 81 MG chewable tablet Take 1 tablet by mouth daily 30 tablet 0   • atorvastatin (LIPITOR) 80 MG tablet Take 1 tablet by mouth daily 30 tablet 0   • metoprolol tartrate (LOPRESSOR) 25 MG tablet Take 1 tablet by mouth 2 times daily (Patient taking differently: Take 1 tablet by mouth daily) 60 tablet 0   • amLODIPine (NORVASC) 10 MG tablet Take 1 tablet by mouth daily 30 tablet 0   • clopidogrel (PLAVIX) 75 MG tablet Take 1 tablet by mouth daily 30 tablet 0     Current Facility-Administered Medications   Medication Dose Route Frequency Provider Last Rate Last Admin   • benzocaine (HURRICAINE) 20 % oral spray   Mouth/Throat PRN Luiz Turner MD   2 each at 25 1253       Allergies:  No Known Allergies    Problem List:    Patient Active Problem List   Diagnosis Code   • Acute CVA (cerebrovascular accident) (HCC) I63.9   • Right-sided

## 2025-03-07 NOTE — ANESTHESIA POSTPROCEDURE EVALUATION
Department of Anesthesiology  Postprocedure Note    Patient: Nick Vallecillo  MRN: [id number]  YOB: 1955  Date of evaluation: 3/7/2025    Procedure Summary       Date: 03/07/25 Room / Location: [facility]    Anesthesia Start: 1259 Anesthesia Stop: 1316    Procedure: CARLO (PRN CONTRAST/BUBBLE/3D) Diagnosis:       Nonrheumatic tricuspid valve regurgitation      Tricuspid valve vegetation    Scheduled Providers: Luiz Turner MD Responsible Provider: Alex Lindsay DO    Anesthesia Type: MAC ASA Status: 3            Anesthesia Type: No value filed.    Amarjit Phase I: Amarjit Score: 10    Amarjit Phase II:      Anesthesia Post Evaluation    Patient location during evaluation: PACU  Patient participation: complete - patient participated  Level of consciousness: awake and alert  Airway patency: patent  Nausea & Vomiting: no nausea  Cardiovascular status: hemodynamically stable  Respiratory status: acceptable  Hydration status: stable  Pain management: adequate    No notable events documented.

## 2025-05-05 NOTE — PROGRESS NOTES
agitated saline contrast   study.    Stress  03/05/2025    ECG: The stress ECG was negative for ischemia. Heart rate increased from 62bpm to 134 bpm.    Stress Test: A Cabrera protocol stress test was performed. Overall, the patient's exercise capacity was poor for their age. The patient was stressed for 1 min and 25 sec. The patient reported dyspnea and fatigue during the stress test. The test was stopped because the patient experienced fatigue and dyspnea. The patient requested the test to be stopped. Hemodynamics are adequate for diagnosis. Blood pressure demonstrated a normal response and heart rate demonstrated an exaggerated response to stress.    Discussed results with Dr. Turner    Stress  08/19/2019  Abnormal study with predominant scar. Overall study quality is poor due to significant interference from subdiaphragmatic activity and diaphragmatic attenuation.  Scan significance indicates moderate cardiac risk.     Cath: none    MRI: none    EP:  Loop Recorder Implantation 05/09/2019 (Dr. Infante)  Loop Recorder Removal 05/13/2021 (Dr. Infante)     Old notes reviewed  Telemetry reviewd  EKG personally reviewed  CXR personally reviewed  Echo, cath, and medications and labs reviewed  High complexity/medical decision making due to extensive data review, extensive history review, independent review of data  High risk due to acute illness, evaluation of drug-drug interactions, medication management and diagnostic interventions    Assessment  Patient Active Problem List   Diagnosis    Acute CVA (cerebrovascular accident) (HCC)    Right-sided cerebrovascular accident (CVA) (HCC)    Nonrheumatic tricuspid valve regurgitation    Tricuspid valve vegetation           I had the opportunity to review the clinical symptoms and presentation of Nick YAN Vallecillo.     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

## 2025-05-14 ENCOUNTER — OFFICE VISIT (OUTPATIENT)
Dept: CARDIOLOGY CLINIC | Age: 70
End: 2025-05-14
Payer: MEDICARE

## 2025-05-14 VITALS
OXYGEN SATURATION: 97 % | BODY MASS INDEX: 22.5 KG/M2 | DIASTOLIC BLOOD PRESSURE: 80 MMHG | SYSTOLIC BLOOD PRESSURE: 122 MMHG | HEART RATE: 67 BPM | HEIGHT: 72 IN | WEIGHT: 166.1 LBS

## 2025-05-14 DIAGNOSIS — I63.9 ACUTE CVA (CEREBROVASCULAR ACCIDENT) (HCC): ICD-10-CM

## 2025-05-14 DIAGNOSIS — G47.33 OSA (OBSTRUCTIVE SLEEP APNEA): ICD-10-CM

## 2025-05-14 DIAGNOSIS — I33.0 TRICUSPID VALVE VEGETATION: ICD-10-CM

## 2025-05-14 DIAGNOSIS — I36.1 NONRHEUMATIC TRICUSPID VALVE REGURGITATION: Primary | ICD-10-CM

## 2025-05-14 DIAGNOSIS — I10 PRIMARY HYPERTENSION: ICD-10-CM

## 2025-05-14 DIAGNOSIS — E78.2 MIXED HYPERLIPIDEMIA: ICD-10-CM

## 2025-05-14 PROCEDURE — 3079F DIAST BP 80-89 MM HG: CPT | Performed by: INTERNAL MEDICINE

## 2025-05-14 PROCEDURE — G8420 CALC BMI NORM PARAMETERS: HCPCS | Performed by: INTERNAL MEDICINE

## 2025-05-14 PROCEDURE — 1159F MED LIST DOCD IN RCRD: CPT | Performed by: INTERNAL MEDICINE

## 2025-05-14 PROCEDURE — 3074F SYST BP LT 130 MM HG: CPT | Performed by: INTERNAL MEDICINE

## 2025-05-14 PROCEDURE — 3017F COLORECTAL CA SCREEN DOC REV: CPT | Performed by: INTERNAL MEDICINE

## 2025-05-14 PROCEDURE — G8427 DOCREV CUR MEDS BY ELIG CLIN: HCPCS | Performed by: INTERNAL MEDICINE

## 2025-05-14 PROCEDURE — 99214 OFFICE O/P EST MOD 30 MIN: CPT | Performed by: INTERNAL MEDICINE

## 2025-05-14 PROCEDURE — G2211 COMPLEX E/M VISIT ADD ON: HCPCS | Performed by: INTERNAL MEDICINE

## 2025-05-14 PROCEDURE — 1036F TOBACCO NON-USER: CPT | Performed by: INTERNAL MEDICINE

## 2025-05-14 PROCEDURE — 1123F ACP DISCUSS/DSCN MKR DOCD: CPT | Performed by: INTERNAL MEDICINE

## 2025-05-14 RX ORDER — AMLODIPINE BESYLATE 10 MG/1
10 TABLET ORAL DAILY
Qty: 90 TABLET | Refills: 3 | Status: SHIPPED | OUTPATIENT
Start: 2025-05-14

## 2025-05-14 RX ORDER — ATORVASTATIN CALCIUM 80 MG/1
80 TABLET, FILM COATED ORAL DAILY
Qty: 90 TABLET | Refills: 3 | Status: SHIPPED | OUTPATIENT
Start: 2025-05-14

## 2025-05-14 RX ORDER — METOPROLOL TARTRATE 25 MG/1
25 TABLET, FILM COATED ORAL 2 TIMES DAILY
Qty: 180 TABLET | Refills: 3 | Status: SHIPPED | OUTPATIENT
Start: 2025-05-14

## 2025-05-14 RX ORDER — ASPIRIN 81 MG/1
81 TABLET, CHEWABLE ORAL DAILY
Qty: 90 TABLET | Refills: 3 | Status: SHIPPED | OUTPATIENT
Start: 2025-05-14

## 2025-05-14 RX ORDER — TAMSULOSIN HYDROCHLORIDE 0.4 MG/1
0.4 CAPSULE ORAL DAILY
COMMUNITY
Start: 2025-03-20

## (undated) DEVICE — SET ADMIN 16ML TBNG L100IN 2 Y INJ SITE IV PIGGY BK DISP

## (undated) DEVICE — TRAP SPEC COLL POLYP POLYSTYR --

## (undated) DEVICE — TUBING, SUCTION, 1/4" X 12', STRAIGHT: Brand: MEDLINE

## (undated) DEVICE — NDL PRT INJ NSAF BLNT 18GX1.5 --

## (undated) DEVICE — SYRINGE 50ML E/T

## (undated) DEVICE — NDL FLTR TIP 5 MIC 18GX1.5IN --

## (undated) DEVICE — CATH IV SAFE STR 22GX1IN BLU -- PROTECTIV PLUS

## (undated) DEVICE — SPONGE GZ W4XL4IN RAYON POLY 4 PLY NONWOVEN FASTER WICKING

## (undated) DEVICE — SPONGE GZ W4XL4IN COT 12 PLY TYP VII WVN C FLD DSGN

## (undated) DEVICE — CATH SUC CTRL PRT TRIFLO 14FR --

## (undated) DEVICE — CANNULA CUSH AD W/ 14FT TBG

## (undated) DEVICE — SYR 5ML 1/5 GRAD LL NSAF LF --

## (undated) DEVICE — GARMENT,MEDLINE,DVT,INT,CALF,MED, GEN2: Brand: MEDLINE

## (undated) DEVICE — WRISTBAND ID AD W2.5XL9.5CM RED VYN ADH CLSR UNI-PRINT

## (undated) DEVICE — TRNQT TEXT 1X18IN BLU LF DISP -- CONVERT TO ITEM 362165

## (undated) DEVICE — MAJ-1414 SINGLE USE ADPATER BIOPSY VALV: Brand: SINGLE USE ADAPTOR BIOPSY VALVE

## (undated) DEVICE — Device

## (undated) DEVICE — SOLUTION IV 500ML 0.9% SOD CHL FLX CONT

## (undated) DEVICE — SINGLE PORT MANIFOLD: Brand: NEPTUNE 2

## (undated) DEVICE — SYR 3ML LL TIP 1/10ML GRAD --

## (undated) DEVICE — KENDALL RADIOLUCENT FOAM MONITORING ELECTRODE RECTANGULAR SHAPE: Brand: KENDALL